# Patient Record
Sex: FEMALE | Race: WHITE | NOT HISPANIC OR LATINO | Employment: UNEMPLOYED | URBAN - NONMETROPOLITAN AREA
[De-identification: names, ages, dates, MRNs, and addresses within clinical notes are randomized per-mention and may not be internally consistent; named-entity substitution may affect disease eponyms.]

---

## 2017-03-29 ENCOUNTER — OFFICE VISIT (OUTPATIENT)
Dept: CARDIOLOGY | Facility: CLINIC | Age: 67
End: 2017-03-29

## 2017-03-29 VITALS
BODY MASS INDEX: 37.03 KG/M2 | HEART RATE: 67 BPM | WEIGHT: 209 LBS | DIASTOLIC BLOOD PRESSURE: 68 MMHG | OXYGEN SATURATION: 96 % | SYSTOLIC BLOOD PRESSURE: 114 MMHG | HEIGHT: 63 IN

## 2017-03-29 DIAGNOSIS — E03.4 HYPOTHYROIDISM DUE TO ACQUIRED ATROPHY OF THYROID: ICD-10-CM

## 2017-03-29 DIAGNOSIS — I10 ESSENTIAL HYPERTENSION: Primary | ICD-10-CM

## 2017-03-29 DIAGNOSIS — R06.02 SOB (SHORTNESS OF BREATH): ICD-10-CM

## 2017-03-29 DIAGNOSIS — E78.2 MIXED HYPERLIPIDEMIA: ICD-10-CM

## 2017-03-29 DIAGNOSIS — I31.39 PERICARDIAL EFFUSION: ICD-10-CM

## 2017-03-29 PROCEDURE — 99213 OFFICE O/P EST LOW 20 MIN: CPT | Performed by: INTERNAL MEDICINE

## 2017-03-29 NOTE — PROGRESS NOTES
subjective     Chief Complaint   Patient presents with   • Chest Pain   • Pericardial Effusion   • Hypertension   • Hyperlipidemia     History of Present Illness     Patient is here for follow-up of trivial pericardial effusion.  Patient had atypical chest pain and cardiac workup. 2 years ago and a stress test using IV Lexiscan was negative for significant exercise-induced myocardial ischemia LV ejection fraction was 77%. An echocardiogram showed trace pericardial effusion which was not significant.  She is doing very well at this time.  she has no chest pain .  She complains of being tired and fatigued off .     Patient is following closely with Dr. Veronica Russo  She brought a copy of her lab work recently done which was all normal including lipid panel thyroid functions and comprehensive metabolic panel.        Lipids are very well controlled with atorvastatin 20 mg daily     Patient also has hypothyroidism. It is controlled with Synthroid 112 µg daily.  Patient has not lost any weight but overall she seems to be doing well. Particularly from cardiac standpoint she has no symptoms     She states that she had the flu shot already from PCP but she has not had Prevnar 13 or pneumonia shot in her life. We will give her Prevnar 13 this year and she was instructed to get pneumonia shot from Dr. Russo next year along with flu shot again next year.     Patient does have severe COPD       Patient Active Problem List   Diagnosis   • Pericardial effusion*   • Hypertension*   • Hyperlipidemia*   • Hypothyroidism*   • COPD (chronic obstructive pulmonary disease)*       Social History   Substance Use Topics   • Smoking status: Former Smoker     Packs/day: 2.00     Years: 30.00     Types: Cigarettes     Quit date: 1980   • Smokeless tobacco: Never Used   • Alcohol use No       Allergies   Allergen Reactions   • Codeine          Current Outpatient Prescriptions:   •  amitriptyline (ELAVIL) 25 MG tablet, Take 25 mg by mouth daily.,  "Disp: , Rfl:   •  atenolol (TENORMIN) 25 MG tablet, Take 25 mg by mouth daily., Disp: , Rfl:   •  atorvastatin (LIPITOR) 20 MG tablet, Take 1 tablet by mouth daily., Disp: 90 tablet, Rfl: 3  •  Budesonide-Formoterol Fumarate (SYMBICORT IN), Inhale 2 puffs 2 (two) times a day as needed., Disp: , Rfl:   •  Calcium Carbonate-Vitamin D (CALCIUM 600+D3 PO), Take  by mouth., Disp: , Rfl:   •  levothyroxine (SYNTHROID, LEVOTHROID) 112 MCG tablet, Take 112 mcg by mouth daily., Disp: , Rfl:   •  lisinopril-hydrochlorothiazide (PRINZIDE,ZESTORETIC) 10-12.5 MG per tablet, Take 1 tablet by mouth daily., Disp: , Rfl:   •  omeprazole (PriLOSEC) 20 MG capsule, Take 20 mg by mouth 2 (two) times a day., Disp: , Rfl:   •  potassium chloride (K-DUR) 10 MEQ CR tablet, Take 10 mEq by mouth daily., Disp: , Rfl:   •  tolterodine LA (DETROL LA) 4 MG 24 hr capsule, Take 4 mg by mouth daily., Disp: , Rfl:   •  vitamin B-12 (CYANOCOBALAMIN) 100 MCG tablet, Take 500 mcg by mouth daily., Disp: , Rfl:   •  aspirin 81 MG EC tablet, Take 81 mg by mouth 3 (Three) Times a Week., Disp: , Rfl:   •  topiramate (TOPAMAX) 50 MG tablet, Take 50 mg by mouth daily., Disp: , Rfl:       The following portions of the patient's history were reviewed and updated as appropriate: allergies, current medications, past family history, past medical history, past social history, past surgical history and problem list.    Review of Systems   Constitution: Negative.   HENT: Negative.    Eyes: Negative.    Cardiovascular: Negative.    Respiratory: Positive for shortness of breath and wheezing.    Hematologic/Lymphatic: Negative.    Musculoskeletal: Negative.    Gastrointestinal: Negative.    Neurological: Negative.           Objective:     /68 (BP Location: Left arm)  Pulse 67  Ht 63\" (160 cm)  Wt 209 lb (94.8 kg)  SpO2 96%  BMI 37.02 kg/m2  Physical Exam   Constitutional: She appears well-developed and well-nourished.   HENT:   Head: Normocephalic and " atraumatic.   Mouth/Throat: Oropharynx is clear and moist.   Eyes: Conjunctivae and EOM are normal. Pupils are equal, round, and reactive to light. No scleral icterus.   Neck: Normal range of motion. Neck supple. No JVD present. No tracheal deviation present. No thyromegaly present.   Cardiovascular: Normal rate, regular rhythm, normal heart sounds and intact distal pulses.  Exam reveals no friction rub.    No murmur heard.  Pulmonary/Chest: Effort normal. No respiratory distress. She has wheezes. She has no rales. She exhibits no tenderness.   Abdominal: Soft. Bowel sounds are normal. She exhibits no distension and no mass. There is no tenderness. There is no rebound and no guarding.   Musculoskeletal: Normal range of motion. She exhibits no edema, tenderness or deformity.   Lymphadenopathy:     She has no cervical adenopathy.   Neurological: She is alert. She has normal reflexes. No cranial nerve deficit. She exhibits normal muscle tone. Coordination normal.   Skin: Skin is warm and dry.   Psychiatric: She has a normal mood and affect. Her behavior is normal. Judgment and thought content normal.         Lab Review    CMP, CBC, lipid panel and TSH dated February 27, 2017 is normal.  Procedures     I personally viewed and interpreted the patient's LAB data         Assessment:     1. Essential hypertension    2. Pericardial effusion*    3. Mixed hyperlipidemia    4. Hypothyroidism due to acquired atrophy of thyroid    5. SOB (shortness of breath)          Plan:      Patient is here for follow-up.  She has history of pericardial effusion  There is no evidence of cardiac decompensation.  She does have severe COPD and gets quite a bit of short of breath.    Blood pressure is very well controlled  Lipid functions are also normal.  No change in therapy was made.          Return in about 6 months (around 9/29/2017).

## 2017-09-14 ENCOUNTER — OFFICE VISIT (OUTPATIENT)
Dept: CARDIOLOGY | Facility: CLINIC | Age: 67
End: 2017-09-14

## 2017-09-14 VITALS
DIASTOLIC BLOOD PRESSURE: 78 MMHG | HEART RATE: 61 BPM | WEIGHT: 203.8 LBS | BODY MASS INDEX: 36.11 KG/M2 | OXYGEN SATURATION: 98 % | HEIGHT: 63 IN | SYSTOLIC BLOOD PRESSURE: 146 MMHG

## 2017-09-14 DIAGNOSIS — Z23 IMMUNIZATION DUE: ICD-10-CM

## 2017-09-14 DIAGNOSIS — R06.02 SOB (SHORTNESS OF BREATH): ICD-10-CM

## 2017-09-14 DIAGNOSIS — I31.39 PERICARDIAL EFFUSION: ICD-10-CM

## 2017-09-14 DIAGNOSIS — E78.2 MIXED HYPERLIPIDEMIA: Primary | ICD-10-CM

## 2017-09-14 PROCEDURE — G0008 ADMIN INFLUENZA VIRUS VAC: HCPCS | Performed by: INTERNAL MEDICINE

## 2017-09-14 PROCEDURE — 90686 IIV4 VACC NO PRSV 0.5 ML IM: CPT | Performed by: INTERNAL MEDICINE

## 2017-09-14 PROCEDURE — 99213 OFFICE O/P EST LOW 20 MIN: CPT | Performed by: INTERNAL MEDICINE

## 2017-09-14 PROCEDURE — 93000 ELECTROCARDIOGRAM COMPLETE: CPT | Performed by: INTERNAL MEDICINE

## 2017-09-14 RX ORDER — CEPHALEXIN 500 MG/1
500 CAPSULE ORAL 4 TIMES DAILY
COMMUNITY
End: 2019-09-12

## 2017-09-14 NOTE — PROGRESS NOTES
subjective     Chief Complaint   Patient presents with   • Follow-up     LAB RESULTS   • Hypertension   • Hyperlipidemia     History of Present Illness  Patient is here for follow-up.  She has history of atypical chest pain 2 years ago her last stress test was negative for significant exercise-induced myocardial ischemia and echo had shown trace pericardial effusion.  Patient has been doing very well.  She denies any chest pain palpitations or shortness of breath.    She recently had lab work done which was reviewed and has been normal.  She has hyperlipidemia very well controlled with atorvastatin 20 mg daily.  She is also hypothyroid and taking Synthroid replacement therapy.  Her TSH has been normal patient had the pneumonia shot last year and the she requests flu shot today.    Past Surgical History:   Procedure Laterality Date   • APPENDECTOMY     • CARDIAC CATHETERIZATION  2008   • CARDIOVASCULAR STRESS TEST  09/2014   • CATARACT EXTRACTION, BILATERAL Bilateral     2015 right eye, Dr Hauser  and  2/2017 left eye Dr Meyer   • ECHO - CONVERTED  09/2014   • GALLBLADDER SURGERY     • HERNIA REPAIR     • TUBAL ABDOMINAL LIGATION       Family History   Problem Relation Age of Onset   • Heart disease Mother    • Diabetes Mother    • Heart attack Mother      cause of death   • Cirrhosis Father      cause of death   • Cancer Sister      liver ca   • Cancer Brother      lung ca   • Cancer Brother      colon ca   • Heart disease Brother    • Heart failure Brother    • Cancer Sister      lung ca   • Heart failure Sister    • Cancer Sister      Past Medical History:   Diagnosis Date   • Chest pain    • COPD (chronic obstructive pulmonary disease)    • GERD (gastroesophageal reflux disease)    • Hyperlipidemia    • Hypertension    • Hypokalemia    • Hypothyroidism    • Pericardial effusion, trivial      Patient Active Problem List   Diagnosis   • Pericardial effusion*   • Hypertension*   • Hyperlipidemia*   • Hypothyroidism*    • COPD (chronic obstructive pulmonary disease)*       Social History   Substance Use Topics   • Smoking status: Former Smoker     Packs/day: 2.00     Years: 30.00     Types: Cigarettes     Quit date: 1980   • Smokeless tobacco: Never Used   • Alcohol use No       Allergies   Allergen Reactions   • Codeine        Current Outpatient Prescriptions on File Prior to Visit   Medication Sig   • amitriptyline (ELAVIL) 25 MG tablet Take 25 mg by mouth daily.   • atenolol (TENORMIN) 25 MG tablet Take 25 mg by mouth daily.   • atorvastatin (LIPITOR) 20 MG tablet Take 1 tablet by mouth daily.   • Budesonide-Formoterol Fumarate (SYMBICORT IN) Inhale 2 puffs 2 (two) times a day as needed.   • Calcium Carbonate-Vitamin D (CALCIUM 600+D3 PO) Take  by mouth.   • levothyroxine (SYNTHROID, LEVOTHROID) 112 MCG tablet Take 112 mcg by mouth daily.   • lisinopril-hydrochlorothiazide (PRINZIDE,ZESTORETIC) 10-12.5 MG per tablet Take 1 tablet by mouth daily.   • omeprazole (PriLOSEC) 20 MG capsule Take 20 mg by mouth 2 (two) times a day.   • potassium chloride (K-DUR) 10 MEQ CR tablet Take 10 mEq by mouth daily.   • tolterodine LA (DETROL LA) 4 MG 24 hr capsule Take 4 mg by mouth daily.   • topiramate (TOPAMAX) 50 MG tablet Take 50 mg by mouth daily.   • vitamin B-12 (CYANOCOBALAMIN) 100 MCG tablet Take 500 mcg by mouth daily.   • aspirin 81 MG EC tablet Take 81 mg by mouth 3 (Three) Times a Week.     No current facility-administered medications on file prior to visit.          The following portions of the patient's history were reviewed and updated as appropriate: allergies, current medications, past family history, past medical history, past social history, past surgical history and problem list.    Review of Systems   Constitution: Negative.   HENT: Negative.  Negative for congestion and headaches.    Eyes: Negative.    Cardiovascular: Negative.  Negative for chest pain, cyanosis, dyspnea on exertion, irregular heartbeat, leg swelling,  "near-syncope, orthopnea, palpitations, paroxysmal nocturnal dyspnea and syncope.   Respiratory: Negative.  Negative for shortness of breath.    Hematologic/Lymphatic: Negative.    Musculoskeletal: Negative.    Gastrointestinal: Negative.    Neurological: Negative.           Objective:     /78 (BP Location: Left arm, Patient Position: Sitting)  Pulse 61  Ht 63\" (160 cm)  Wt 203 lb 12.8 oz (92.4 kg)  SpO2 98%  BMI 36.1 kg/m2  Physical Exam   Constitutional: She appears well-developed and well-nourished. No distress.   HENT:   Head: Normocephalic and atraumatic.   Mouth/Throat: Oropharynx is clear and moist. No oropharyngeal exudate.   Eyes: Conjunctivae and EOM are normal. Pupils are equal, round, and reactive to light. No scleral icterus.   Neck: Normal range of motion. Neck supple. No JVD present. No tracheal deviation present. No thyromegaly present.   Cardiovascular: Normal rate, regular rhythm, normal heart sounds and intact distal pulses.  PMI is not displaced.  Exam reveals no gallop, no friction rub and no decreased pulses.    No murmur heard.  Pulses:       Carotid pulses are 3+ on the right side, and 3+ on the left side.       Radial pulses are 3+ on the right side, and 3+ on the left side.   Pulmonary/Chest: Effort normal and breath sounds normal. No respiratory distress. She has no wheezes. She has no rales. She exhibits no tenderness.   Abdominal: Soft. Bowel sounds are normal. She exhibits no distension, no abdominal bruit and no mass. There is no splenomegaly or hepatomegaly. There is no tenderness. There is no rebound and no guarding.   Musculoskeletal: Normal range of motion. She exhibits no edema, tenderness or deformity.   Lymphadenopathy:     She has no cervical adenopathy.   Neurological: She is alert. She has normal reflexes. No cranial nerve deficit. She exhibits normal muscle tone. Coordination normal.   Skin: Skin is warm and dry. No rash noted. She is not diaphoretic. No erythema. "   Psychiatric: She has a normal mood and affect. Her behavior is normal. Judgment and thought content normal.         Lab ReviewDated 8/24/2017  CBC CMP normal sodium mildly elevated 146  Lipid panel normal, hemoglobin A1c 5.8, TSH 0.826       ECG 12 Lead  Date/Time: 9/14/2017 4:02 PM  Performed by: NICO JARAMILLO  Authorized by: NICO JARAMILLO   Rhythm: sinus rhythm  Rate: normal  Conduction: conduction normal  ST Segments: ST segments normal  T Waves: T waves normal  QRS axis: normal  Other findings: LAE  Clinical impression: abnormal ECG               I personally viewed and interpreted the patient's LAB data         Assessment:     1. Mixed hyperlipidemia    2. Immunization due    3. Pericardial effusion*    4. SOB (shortness of breath)          Plan:      Patient is doing very well.  Lab work reviewed.  Lipids are very nicely controlled.  Patient will be reevaluated in 6 months with lab work again.  Flu shot was given per patient's request.  Lab work discussed with the patient.  EKG was done which was also discussed no acute abnormality found.        Return in about 6 months (around 3/14/2018).

## 2018-03-14 ENCOUNTER — OFFICE VISIT (OUTPATIENT)
Dept: CARDIOLOGY | Facility: CLINIC | Age: 68
End: 2018-03-14

## 2018-03-14 VITALS
OXYGEN SATURATION: 98 % | WEIGHT: 204 LBS | DIASTOLIC BLOOD PRESSURE: 80 MMHG | BODY MASS INDEX: 36.14 KG/M2 | HEIGHT: 63 IN | SYSTOLIC BLOOD PRESSURE: 132 MMHG | HEART RATE: 78 BPM

## 2018-03-14 DIAGNOSIS — E78.2 MIXED HYPERLIPIDEMIA: Primary | ICD-10-CM

## 2018-03-14 DIAGNOSIS — I10 ESSENTIAL HYPERTENSION: ICD-10-CM

## 2018-03-14 DIAGNOSIS — E66.09 CLASS 2 OBESITY DUE TO EXCESS CALORIES WITHOUT SERIOUS COMORBIDITY WITH BODY MASS INDEX (BMI) OF 36.0 TO 36.9 IN ADULT: ICD-10-CM

## 2018-03-14 PROCEDURE — 99213 OFFICE O/P EST LOW 20 MIN: CPT | Performed by: INTERNAL MEDICINE

## 2018-03-14 NOTE — PROGRESS NOTES
subjective     Chief Complaint   Patient presents with   • Follow-up     Routine check up   • Results     Lab Results     History of Present Illness    Patient is 68 years old white female who has a history of hyperlipidemia.  She is taking Lipitor 20 mg daily.  Lab work reviewed and discussed with the patient.  Lab work shows normal lipid control.  With no side effects.    Patient is overweight she has not lost any weight.  Healthy lifestyle was discussed.    Patient also has hypothyroidism which is being managed by her PCP Dr. Russo.  TSH was low and Synthroid dose was recently decreased to 100 µg daily by her physician.  Overall she seems to be doing better.  The patient is very well controlled and no specific complaints    Past Surgical History:   Procedure Laterality Date   • APPENDECTOMY     • CARDIAC CATHETERIZATION  2008   • CARDIOVASCULAR STRESS TEST  09/2014   • CATARACT EXTRACTION, BILATERAL Bilateral     2015 right eye, Dr Hauser  and  2/2017 left eye Dr Meyer   • ECHO - CONVERTED  09/2014   • GALLBLADDER SURGERY     • HERNIA REPAIR     • TUBAL ABDOMINAL LIGATION       Family History   Problem Relation Age of Onset   • Heart disease Mother    • Diabetes Mother    • Heart attack Mother      cause of death   • Cirrhosis Father      cause of death   • Cancer Sister      liver ca   • Cancer Brother      lung ca   • Cancer Brother      colon ca   • Heart disease Brother    • Heart failure Brother    • Cancer Sister      lung ca   • Heart failure Sister    • Cancer Sister      Past Medical History:   Diagnosis Date   • Chest pain    • COPD (chronic obstructive pulmonary disease)    • GERD (gastroesophageal reflux disease)    • Hyperlipidemia    • Hypertension    • Hypokalemia    • Hypothyroidism    • Pericardial effusion, trivial      Patient Active Problem List   Diagnosis   • Pericardial effusion*   • Hypertension*   • Hyperlipidemia*   • Hypothyroidism*   • COPD (chronic obstructive pulmonary disease)*   •  Class 2 obesity due to excess calories without serious comorbidity with body mass index (BMI) of 36.0 to 36.9 in adult       Social History   Substance Use Topics   • Smoking status: Former Smoker     Packs/day: 2.00     Years: 30.00     Types: Cigarettes     Quit date: 1980   • Smokeless tobacco: Never Used   • Alcohol use No       Allergies   Allergen Reactions   • Codeine        Current Outpatient Prescriptions on File Prior to Visit   Medication Sig   • amitriptyline (ELAVIL) 25 MG tablet Take 25 mg by mouth daily.   • atenolol (TENORMIN) 25 MG tablet Take 25 mg by mouth daily.   • atorvastatin (LIPITOR) 20 MG tablet Take 1 tablet by mouth daily.   • Budesonide-Formoterol Fumarate (SYMBICORT IN) Inhale 2 puffs 2 (two) times a day as needed.   • Calcium Carbonate-Vitamin D (CALCIUM 600+D3 PO) Take  by mouth.   • cephalexin (KEFLEX) 500 MG capsule Take 500 mg by mouth 4 (Four) Times a Day.   • levothyroxine (SYNTHROID, LEVOTHROID) 112 MCG tablet Take 100 mcg by mouth Daily.   • lisinopril-hydrochlorothiazide (PRINZIDE,ZESTORETIC) 10-12.5 MG per tablet Take 1 tablet by mouth daily.   • omeprazole (PriLOSEC) 20 MG capsule Take 20 mg by mouth 2 (two) times a day.   • potassium chloride (K-DUR) 10 MEQ CR tablet Take 10 mEq by mouth daily.   • tolterodine LA (DETROL LA) 4 MG 24 hr capsule Take 4 mg by mouth daily.   • vitamin B-12 (CYANOCOBALAMIN) 100 MCG tablet Take 500 mcg by mouth daily.   • aspirin 81 MG EC tablet Take 81 mg by mouth 3 (Three) Times a Week.   • topiramate (TOPAMAX) 50 MG tablet Take 50 mg by mouth daily.     No current facility-administered medications on file prior to visit.          The following portions of the patient's history were reviewed and updated as appropriate: allergies, current medications, past family history, past medical history, past social history, past surgical history and problem list.    Review of Systems   Constitution: Negative.   HENT: Negative.  Negative for congestion.   "  Eyes: Negative.    Cardiovascular: Negative.  Negative for chest pain, cyanosis, dyspnea on exertion, irregular heartbeat, leg swelling, near-syncope, orthopnea, palpitations, paroxysmal nocturnal dyspnea and syncope.   Respiratory: Negative.  Negative for shortness of breath.    Hematologic/Lymphatic: Negative.    Musculoskeletal: Negative.    Gastrointestinal: Negative.    Neurological: Negative.  Negative for headaches.          Objective:     /80 (BP Location: Left arm, Patient Position: Sitting)   Pulse 78   Ht 160 cm (63\")   Wt 92.5 kg (204 lb)   SpO2 98%   Breastfeeding? Unknown   BMI 36.14 kg/m²   Physical Exam   Constitutional: She appears well-developed and well-nourished. No distress.   HENT:   Head: Normocephalic and atraumatic.   Mouth/Throat: Oropharynx is clear and moist. No oropharyngeal exudate.   Eyes: Conjunctivae and EOM are normal. Pupils are equal, round, and reactive to light. No scleral icterus.   Neck: Normal range of motion. Neck supple. No JVD present. No tracheal deviation present. No thyromegaly present.   Cardiovascular: Normal rate, regular rhythm, normal heart sounds and intact distal pulses.  PMI is not displaced.  Exam reveals no gallop, no friction rub and no decreased pulses.    No murmur heard.  Pulses:       Carotid pulses are 3+ on the right side, and 3+ on the left side.       Radial pulses are 3+ on the right side, and 3+ on the left side.   Pulmonary/Chest: Effort normal and breath sounds normal. No respiratory distress. She has no wheezes. She has no rales. She exhibits no tenderness.   Abdominal: Soft. Bowel sounds are normal. She exhibits no distension, no abdominal bruit and no mass. There is no splenomegaly or hepatomegaly. There is no tenderness. There is no rebound and no guarding.   Musculoskeletal: Normal range of motion. She exhibits no edema, tenderness or deformity.   Lymphadenopathy:     She has no cervical adenopathy.   Neurological: She is alert. " She has normal reflexes. No cranial nerve deficit. She exhibits normal muscle tone. Coordination normal.   Skin: Skin is warm and dry. No rash noted. She is not diaphoretic. No erythema.   Psychiatric: She has a normal mood and affect. Her behavior is normal. Judgment and thought content normal.         Lab Review  CBC CMP lipid panel vitamin D normal except sugar 136 and A1c 5.7.  TSH is low at 0.248  Procedures       I personally viewed and interpreted the patient's LAB data         Assessment:     1. Mixed hyperlipidemia    2. Essential hypertension    3. Class 2 obesity due to excess calories without serious comorbidity with body mass index (BMI) of 36.0 to 36.9 in adult          Plan:      Labs reviewed and discussed with the patient lipids are normal with normal liver functions and CPK.  Patient will continue Lipitor 20 mg daily    Blood pressure is very well controlled on Norvasc, lisinopril   and Coreg.    Healthy lifestyle and weight loss exercise program discussed with the patient follow-up in 6 months with lab work.  Refills of Lipitor    Return in about 6 months (around 9/14/2018).

## 2018-09-14 ENCOUNTER — OFFICE VISIT (OUTPATIENT)
Dept: CARDIOLOGY | Facility: CLINIC | Age: 68
End: 2018-09-14

## 2018-09-14 VITALS
RESPIRATION RATE: 16 BRPM | HEIGHT: 63 IN | BODY MASS INDEX: 37.6 KG/M2 | WEIGHT: 212.2 LBS | OXYGEN SATURATION: 98 % | HEART RATE: 70 BPM | SYSTOLIC BLOOD PRESSURE: 128 MMHG | DIASTOLIC BLOOD PRESSURE: 72 MMHG

## 2018-09-14 DIAGNOSIS — I10 ESSENTIAL HYPERTENSION: ICD-10-CM

## 2018-09-14 DIAGNOSIS — R06.02 SOB (SHORTNESS OF BREATH): ICD-10-CM

## 2018-09-14 DIAGNOSIS — E66.09 CLASS 2 OBESITY DUE TO EXCESS CALORIES WITHOUT SERIOUS COMORBIDITY WITH BODY MASS INDEX (BMI) OF 36.0 TO 36.9 IN ADULT: ICD-10-CM

## 2018-09-14 DIAGNOSIS — E78.2 MIXED HYPERLIPIDEMIA: Primary | ICD-10-CM

## 2018-09-14 PROCEDURE — 93000 ELECTROCARDIOGRAM COMPLETE: CPT | Performed by: INTERNAL MEDICINE

## 2018-09-14 PROCEDURE — 99213 OFFICE O/P EST LOW 20 MIN: CPT | Performed by: INTERNAL MEDICINE

## 2018-09-14 NOTE — PROGRESS NOTES
subjective     Chief Complaint   Patient presents with   • Hypertension   • Hyperlipidemia     History of Present Illness    Patient is 68 years old white female who is here for follow-up.  Patient has history of hypertension and hyperlipidemia.  She is taking Tenormin 25 mg daily and lisinopril hydrochlorothiazide.  Blood pressure is very well controlled.  Patient also has hyperlipidemia and she is taking Lipitor 20 mg daily.  There is no drug side effects.  She also has hypothyroidism on Synthroid replacement therapy    Past Surgical History:   Procedure Laterality Date   • APPENDECTOMY     • CARDIAC CATHETERIZATION  2008   • CARDIOVASCULAR STRESS TEST  09/2014   • CATARACT EXTRACTION, BILATERAL Bilateral     2015 right eye, Dr Hauser  and  2/2017 left eye Dr Meyer   • ECHO - CONVERTED  09/2014   • GALLBLADDER SURGERY     • HERNIA REPAIR     • TUBAL ABDOMINAL LIGATION       Family History   Problem Relation Age of Onset   • Heart disease Mother    • Diabetes Mother    • Heart attack Mother         cause of death   • Cirrhosis Father         cause of death   • Cancer Sister         liver ca   • Cancer Brother         lung ca   • Cancer Brother         colon ca   • Heart disease Brother    • Heart failure Brother    • Cancer Sister         lung ca   • Heart failure Sister    • Cancer Sister      Past Medical History:   Diagnosis Date   • Chest pain    • COPD (chronic obstructive pulmonary disease) (CMS/HCC)    • GERD (gastroesophageal reflux disease)    • Hyperlipidemia    • Hypertension    • Hypokalemia    • Hypothyroidism    • Pericardial effusion, trivial      Patient Active Problem List   Diagnosis   • Pericardial effusion*   • Hypertension*   • Hyperlipidemia*   • Hypothyroidism*   • COPD (chronic obstructive pulmonary disease)*   • Class 2 obesity due to excess calories without serious comorbidity with body mass index (BMI) of 36.0 to 36.9 in adult       Social History   Substance Use Topics   • Smoking status:  Former Smoker     Packs/day: 2.00     Years: 30.00     Types: Cigarettes     Quit date: 1980   • Smokeless tobacco: Never Used   • Alcohol use No       Allergies   Allergen Reactions   • Codeine        Current Outpatient Prescriptions on File Prior to Visit   Medication Sig   • amitriptyline (ELAVIL) 25 MG tablet Take 25 mg by mouth daily.   • aspirin 81 MG EC tablet Take 81 mg by mouth 3 (Three) Times a Week.   • atenolol (TENORMIN) 25 MG tablet Take 25 mg by mouth daily.   • atorvastatin (LIPITOR) 20 MG tablet Take 1 tablet by mouth daily.   • Budesonide-Formoterol Fumarate (SYMBICORT IN) Inhale 2 puffs 2 (two) times a day as needed.   • Calcium Carbonate-Vitamin D (CALCIUM 600+D3 PO) Take  by mouth.   • cephalexin (KEFLEX) 500 MG capsule Take 500 mg by mouth 4 (Four) Times a Day.   • levothyroxine (SYNTHROID, LEVOTHROID) 112 MCG tablet Take 100 mcg by mouth Daily.   • lisinopril-hydrochlorothiazide (PRINZIDE,ZESTORETIC) 10-12.5 MG per tablet Take 1 tablet by mouth daily.   • omeprazole (PriLOSEC) 20 MG capsule Take 20 mg by mouth 2 (two) times a day.   • potassium chloride (K-DUR) 10 MEQ CR tablet Take 10 mEq by mouth daily.   • tolterodine LA (DETROL LA) 4 MG 24 hr capsule Take 4 mg by mouth daily.   • topiramate (TOPAMAX) 50 MG tablet Take 50 mg by mouth daily.   • vitamin B-12 (CYANOCOBALAMIN) 100 MCG tablet Take 500 mcg by mouth daily.     No current facility-administered medications on file prior to visit.          The following portions of the patient's history were reviewed and updated as appropriate: allergies, current medications, past family history, past medical history, past social history, past surgical history and problem list.    Review of Systems   Constitution: Negative.   HENT: Negative.  Negative for congestion.    Eyes: Negative.    Cardiovascular: Negative.  Negative for chest pain, cyanosis, dyspnea on exertion, irregular heartbeat, leg swelling, near-syncope, orthopnea, palpitations,  "paroxysmal nocturnal dyspnea and syncope.   Respiratory: Negative.  Negative for shortness of breath.    Hematologic/Lymphatic: Negative.    Musculoskeletal: Negative.    Gastrointestinal: Negative.    Neurological: Negative.  Negative for headaches.          Objective:     /72 (BP Location: Right arm)   Pulse 70   Resp 16   Ht 160 cm (62.99\")   Wt 96.3 kg (212 lb 3.2 oz)   SpO2 98%   BMI 37.60 kg/m²   Physical Exam   Constitutional: She appears well-developed and well-nourished. No distress.   HENT:   Head: Normocephalic and atraumatic.   Mouth/Throat: Oropharynx is clear and moist. No oropharyngeal exudate.   Eyes: Pupils are equal, round, and reactive to light. Conjunctivae and EOM are normal. No scleral icterus.   Neck: Normal range of motion. Neck supple. No JVD present. No tracheal deviation present. No thyromegaly present.   Cardiovascular: Normal rate, regular rhythm, normal heart sounds and intact distal pulses.  PMI is not displaced.  Exam reveals no gallop, no friction rub and no decreased pulses.    No murmur heard.  Pulses:       Carotid pulses are 3+ on the right side, and 3+ on the left side.       Radial pulses are 3+ on the right side, and 3+ on the left side.   Pulmonary/Chest: Effort normal and breath sounds normal. No respiratory distress. She has no wheezes. She has no rales. She exhibits no tenderness.   Abdominal: Soft. Bowel sounds are normal. She exhibits no distension, no abdominal bruit and no mass. There is no splenomegaly or hepatomegaly. There is no tenderness. There is no rebound and no guarding.   Musculoskeletal: Normal range of motion. She exhibits no edema, tenderness or deformity.   Lymphadenopathy:     She has no cervical adenopathy.   Neurological: She is alert. She has normal reflexes. No cranial nerve deficit. She exhibits normal muscle tone. Coordination normal.   Skin: Skin is warm and dry. No rash noted. She is not diaphoretic. No erythema.   Psychiatric: She " has a normal mood and affect. Her behavior is normal. Judgment and thought content normal.         Lab Review            ECG 12 Lead  Date/Time: 9/14/2018 6:22 PM  Performed by: NICO JARAMILLO  Authorized by: NICO JARAMILLO   Comparison: compared with previous ECG from 9/14/2017  Similar to previous ECG  Rhythm: sinus rhythm  Rate: normal  BPM: 87  Conduction: incomplete RBBB  ST Segments: ST segments normal  T Waves: T waves normal  QRS axis: normal  Other: no other findings  Clinical impression: normal ECG               I personally viewed and interpreted the patient's LAB data         Assessment:     1. Mixed hyperlipidemia    2. Essential hypertension    3. Class 2 obesity due to excess calories without serious comorbidity with body mass index (BMI) of 36.0 to 36.9 in adult    4. SOB (shortness of breath)          Plan:      Patient has hyperlipidemia.  Labs reviewed cholesterol is 168 triglyceride 169.  HDL is 55 LDL 79.  Patient was advised to continue Lipitor 20 mg daily.  Blood pressure is very well controlled.  Weight loss was emphasized.  No change in therapy.        Return in about 6 months (around 3/14/2019).

## 2019-03-14 ENCOUNTER — OFFICE VISIT (OUTPATIENT)
Dept: CARDIOLOGY | Facility: CLINIC | Age: 69
End: 2019-03-14

## 2019-03-14 VITALS
BODY MASS INDEX: 48.2 KG/M2 | HEART RATE: 71 BPM | WEIGHT: 272 LBS | OXYGEN SATURATION: 96 % | HEIGHT: 63 IN | DIASTOLIC BLOOD PRESSURE: 78 MMHG | SYSTOLIC BLOOD PRESSURE: 132 MMHG

## 2019-03-14 DIAGNOSIS — I31.39 PERICARDIAL EFFUSION: ICD-10-CM

## 2019-03-14 DIAGNOSIS — E66.09 CLASS 2 OBESITY DUE TO EXCESS CALORIES WITHOUT SERIOUS COMORBIDITY WITH BODY MASS INDEX (BMI) OF 36.0 TO 36.9 IN ADULT: ICD-10-CM

## 2019-03-14 DIAGNOSIS — I10 ESSENTIAL HYPERTENSION: Primary | ICD-10-CM

## 2019-03-14 DIAGNOSIS — E78.2 MIXED HYPERLIPIDEMIA: ICD-10-CM

## 2019-03-14 DIAGNOSIS — R06.02 SOB (SHORTNESS OF BREATH): ICD-10-CM

## 2019-03-14 PROCEDURE — 99213 OFFICE O/P EST LOW 20 MIN: CPT | Performed by: INTERNAL MEDICINE

## 2019-03-14 RX ORDER — ATORVASTATIN CALCIUM 20 MG/1
20 TABLET, FILM COATED ORAL DAILY
COMMUNITY

## 2019-03-14 NOTE — PROGRESS NOTES
subjective     Chief Complaint   Patient presents with   • Hyperlipidemia   • Follow-up     History of Present Illness  Patient is 69 years old white female who is here for cardiology follow-up.  Patient has multiple chronic medical problems.  She has a trivial pericardial effusion which is hemodynamically insignificant  Patient is overweight and is trying to lose weight.  She also has essential hypertension and hyperlipidemia.  She recently had lab work done which will be reviewed.  Patient has hypothyroidism on Synthroid replacement therapy.  Clinically she is doing very well and is asymptomatic.    Past Surgical History:   Procedure Laterality Date   • APPENDECTOMY     • CARDIAC CATHETERIZATION  2008   • CARDIOVASCULAR STRESS TEST  09/2014   • CATARACT EXTRACTION, BILATERAL Bilateral     2015 right eye, Dr Hauser  and  2/2017 left eye Dr Meyer   • ECHO - CONVERTED  09/2014   • GALLBLADDER SURGERY     • HERNIA REPAIR     • TUBAL ABDOMINAL LIGATION       Family History   Problem Relation Age of Onset   • Heart disease Mother    • Diabetes Mother    • Heart attack Mother         cause of death   • Cirrhosis Father         cause of death   • Cancer Sister         liver ca   • Cancer Brother         lung ca   • Cancer Brother         colon ca   • Heart disease Brother    • Heart failure Brother    • Cancer Sister         lung ca   • Heart failure Sister    • Cancer Sister      Past Medical History:   Diagnosis Date   • Chest pain    • COPD (chronic obstructive pulmonary disease) (CMS/HCC)    • GERD (gastroesophageal reflux disease)    • Hyperlipidemia    • Hypertension    • Hypokalemia    • Hypothyroidism    • Pericardial effusion, trivial      Patient Active Problem List   Diagnosis   • Pericardial effusion*   • Hypertension*   • Hyperlipidemia*   • Hypothyroidism*   • COPD (chronic obstructive pulmonary disease)*   • Class 2 obesity due to excess calories without serious comorbidity with body mass index (BMI) of 36.0  to 36.9 in adult       Social History     Tobacco Use   • Smoking status: Former Smoker     Packs/day: 2.00     Years: 30.00     Pack years: 60.00     Types: Cigarettes     Last attempt to quit: 1980     Years since quittin.2   • Smokeless tobacco: Never Used   Substance Use Topics   • Alcohol use: No   • Drug use: No       Allergies   Allergen Reactions   • Codeine        Current Outpatient Medications on File Prior to Visit   Medication Sig   • amitriptyline (ELAVIL) 25 MG tablet Take 25 mg by mouth daily.   • atenolol (TENORMIN) 25 MG tablet Take 25 mg by mouth daily.   • atorvastatin (LIPITOR) 20 MG tablet Take 20 mg by mouth Daily.   • Budesonide-Formoterol Fumarate (SYMBICORT IN) Inhale 2 puffs 2 (two) times a day as needed.   • Calcium Carbonate-Vitamin D (CALCIUM 600+D3 PO) Take  by mouth.   • cephalexin (KEFLEX) 500 MG capsule Take 500 mg by mouth 4 (Four) Times a Day.   • levothyroxine (SYNTHROID, LEVOTHROID) 112 MCG tablet Take 100 mcg by mouth Daily.   • lisinopril-hydrochlorothiazide (PRINZIDE,ZESTORETIC) 10-12.5 MG per tablet Take 1 tablet by mouth daily.   • omeprazole (PriLOSEC) 20 MG capsule Take 20 mg by mouth 2 (two) times a day.   • potassium chloride (K-DUR) 10 MEQ CR tablet Take 10 mEq by mouth daily.   • tolterodine LA (DETROL LA) 4 MG 24 hr capsule Take 4 mg by mouth daily.   • topiramate (TOPAMAX) 50 MG tablet Take 50 mg by mouth daily.   • vitamin B-12 (CYANOCOBALAMIN) 100 MCG tablet Take 500 mcg by mouth daily.   • [DISCONTINUED] atorvastatin (LIPITOR) 20 MG tablet Take 1 tablet by mouth daily.   • [DISCONTINUED] aspirin 81 MG EC tablet Take 81 mg by mouth 3 (Three) Times a Week.     No current facility-administered medications on file prior to visit.          The following portions of the patient's history were reviewed and updated as appropriate: allergies, current medications, past family history, past medical history, past social history, past surgical history and problem  "list.    Review of Systems   Constitution: Negative.   HENT: Negative for congestion.    Eyes: Negative.    Cardiovascular: Negative.  Negative for chest pain, cyanosis, dyspnea on exertion, irregular heartbeat, leg swelling, near-syncope, orthopnea, palpitations, paroxysmal nocturnal dyspnea and syncope.   Respiratory: Positive for shortness of breath.    Hematologic/Lymphatic: Negative.    Musculoskeletal: Negative.    Gastrointestinal: Positive for heartburn.   Genitourinary: Positive for urgency.   Neurological: Positive for headaches.   Psychiatric/Behavioral: The patient is nervous/anxious.           Objective:     /78 (BP Location: Left arm, Patient Position: Sitting)   Pulse 71   Ht 160 cm (62.99\")   Wt 123 kg (272 lb)   SpO2 96%   BMI 48.20 kg/m²   Physical Exam   Constitutional: She appears well-developed and well-nourished. No distress.   HENT:   Head: Normocephalic and atraumatic.   Mouth/Throat: Oropharynx is clear and moist. No oropharyngeal exudate.   Eyes: Conjunctivae and EOM are normal. Pupils are equal, round, and reactive to light. No scleral icterus.   Neck: Normal range of motion. Neck supple. No JVD present. No tracheal deviation present. No thyromegaly present.   Cardiovascular: Normal rate, regular rhythm, normal heart sounds and intact distal pulses. PMI is not displaced. Exam reveals no gallop, no friction rub and no decreased pulses.   No murmur heard.  Pulses:       Carotid pulses are 3+ on the right side, and 3+ on the left side.       Radial pulses are 3+ on the right side, and 3+ on the left side.   Pulmonary/Chest: Effort normal and breath sounds normal. No respiratory distress. She has no wheezes. She has no rales. She exhibits no tenderness.   Abdominal: Soft. Bowel sounds are normal. She exhibits no distension, no abdominal bruit and no mass. There is no splenomegaly or hepatomegaly. There is no tenderness. There is no rebound and no guarding.   Musculoskeletal: Normal " range of motion. She exhibits no edema, tenderness or deformity.   Lymphadenopathy:     She has no cervical adenopathy.   Neurological: She is alert. She has normal reflexes. No cranial nerve deficit. She exhibits normal muscle tone. Coordination normal.   Skin: Skin is warm and dry. No rash noted. She is not diaphoretic. No erythema.   Psychiatric: She has a normal mood and affect. Her behavior is normal. Judgment and thought content normal.         Lab Review Dated January 22, 2019  CBC CMP lipid panel thyroid functions reviewed and discussed with the patient  All normal except estimated GFR  54    Procedures       I personally viewed and interpreted the patient's LAB data         Assessment:     1. Essential hypertension    2. SOB (shortness of breath)    3. Mixed hyperlipidemia    4. Pericardial effusion*    5. Class 2 obesity due to excess calories without serious comorbidity with body mass index (BMI) of 36.0 to 36.9 in adult          Plan:     Patient has a trivial pericardial effusion in the past without cardiac tamponade.  This is probably related to obesity and hypothyroidism.  Lab work reviewed.  CBC CMP lipid panel and thyroid functions are all normal.  Estimated GFR is 54.  Blood pressure is very well controlled.  Lipid panel is normal.  Patient is still trying to lose weight and is quite a bit overweight.  Healthy lifestyle again discussed.  She will follow closely with Roxie HAMMOND.  No change in therapy        Return in about 6 months (around 9/14/2019).

## 2019-09-12 ENCOUNTER — OFFICE VISIT (OUTPATIENT)
Dept: CARDIOLOGY | Facility: CLINIC | Age: 69
End: 2019-09-12

## 2019-09-12 VITALS
OXYGEN SATURATION: 97 % | DIASTOLIC BLOOD PRESSURE: 88 MMHG | HEART RATE: 60 BPM | HEIGHT: 63 IN | SYSTOLIC BLOOD PRESSURE: 134 MMHG | WEIGHT: 209.8 LBS | BODY MASS INDEX: 37.17 KG/M2

## 2019-09-12 DIAGNOSIS — E66.09 CLASS 2 OBESITY DUE TO EXCESS CALORIES WITHOUT SERIOUS COMORBIDITY WITH BODY MASS INDEX (BMI) OF 36.0 TO 36.9 IN ADULT: ICD-10-CM

## 2019-09-12 DIAGNOSIS — I10 ESSENTIAL HYPERTENSION: ICD-10-CM

## 2019-09-12 DIAGNOSIS — E78.2 MIXED HYPERLIPIDEMIA: Primary | ICD-10-CM

## 2019-09-12 PROCEDURE — 99213 OFFICE O/P EST LOW 20 MIN: CPT | Performed by: INTERNAL MEDICINE

## 2019-09-12 NOTE — PROGRESS NOTES
subjective     Chief Complaint   Patient presents with   • Hypertension     History of Present Illness  Patient is 69 years old white female who is here for cardiology follow-up.  He has a history of hyperlipidemia and is taking Lipitor 20 mg daily.  She is tolerating medications very well without any drug side effects.  Heart rate and blood pressure is also controlled.  She is taking lisinopril 10/12.5 along with Tenormin 25 mg daily.  Patient states that her lab work was very good at this time lipid panel was normal blood sugar was also good.  Thyroid functions were slightly abnormal and thyroid dose had to be increased.  Patient is also trying to lose weight and has lost a few pounds.  She is very active and is doing better.  Patient has history of trivial pericardial effusion but currently is totally asymptomatic    Past Surgical History:   Procedure Laterality Date   • APPENDECTOMY     • CARDIAC CATHETERIZATION  2008   • CARDIOVASCULAR STRESS TEST  09/2014   • CATARACT EXTRACTION, BILATERAL Bilateral     2015 right eye, Dr Hauser  and  2/2017 left eye Dr Meyer   • ECHO - CONVERTED  09/2014   • GALLBLADDER SURGERY     • HERNIA REPAIR     • TUBAL ABDOMINAL LIGATION       Family History   Problem Relation Age of Onset   • Heart disease Mother    • Diabetes Mother    • Heart attack Mother         cause of death   • Cirrhosis Father         cause of death   • Cancer Sister         liver ca   • Cancer Brother         lung ca   • Cancer Brother         colon ca   • Heart disease Brother    • Heart failure Brother    • Cancer Sister         lung ca   • Heart failure Sister    • Cancer Sister      Past Medical History:   Diagnosis Date   • Chest pain    • COPD (chronic obstructive pulmonary disease) (CMS/HCC)    • GERD (gastroesophageal reflux disease)    • Hyperlipidemia    • Hypertension    • Hypokalemia    • Hypothyroidism    • Pericardial effusion, trivial      Patient Active Problem List   Diagnosis   • Pericardial  effusion*   • Hypertension*   • Hyperlipidemia*   • Hypothyroidism*   • COPD (chronic obstructive pulmonary disease)*   • Class 2 obesity due to excess calories without serious comorbidity with body mass index (BMI) of 36.0 to 36.9 in adult       Social History     Tobacco Use   • Smoking status: Former Smoker     Packs/day: 2.00     Years: 30.00     Pack years: 60.00     Types: Cigarettes     Last attempt to quit: 1980     Years since quittin.7   • Smokeless tobacco: Never Used   Substance Use Topics   • Alcohol use: No   • Drug use: No       Allergies   Allergen Reactions   • Codeine        Current Outpatient Medications on File Prior to Visit   Medication Sig   • amitriptyline (ELAVIL) 25 MG tablet Take 25 mg by mouth daily.   • atenolol (TENORMIN) 25 MG tablet Take 25 mg by mouth daily.   • atorvastatin (LIPITOR) 20 MG tablet Take 20 mg by mouth Daily.   • Budesonide-Formoterol Fumarate (SYMBICORT IN) Inhale 2 puffs 2 (two) times a day as needed.   • Calcium Carbonate-Vitamin D (CALCIUM 600+D3 PO) Take  by mouth.   • levothyroxine sodium (TIROSINT) 125 MCG capsule capsule Take 125 mcg by mouth Daily.   • lisinopril-hydrochlorothiazide (PRINZIDE,ZESTORETIC) 10-12.5 MG per tablet Take 1 tablet by mouth daily.   • omeprazole (PriLOSEC) 20 MG capsule Take 20 mg by mouth 2 (two) times a day.   • potassium chloride (K-DUR) 10 MEQ CR tablet Take 10 mEq by mouth daily.   • tolterodine LA (DETROL LA) 4 MG 24 hr capsule Take 4 mg by mouth daily.   • topiramate (TOPAMAX) 50 MG tablet Take 50 mg by mouth daily.   • vitamin B-12 (CYANOCOBALAMIN) 1000 MCG tablet Take 1,000 mcg by mouth Daily.   • [DISCONTINUED] cephalexin (KEFLEX) 500 MG capsule Take 500 mg by mouth 4 (Four) Times a Day.     No current facility-administered medications on file prior to visit.          The following portions of the patient's history were reviewed and updated as appropriate: allergies, current medications, past family history, past medical  "history, past social history, past surgical history and problem list.    Review of Systems   Constitution: Negative.   HENT: Negative.  Negative for congestion.    Eyes: Negative.    Cardiovascular: Negative.  Negative for chest pain, cyanosis, dyspnea on exertion, irregular heartbeat, leg swelling, near-syncope, orthopnea, palpitations, paroxysmal nocturnal dyspnea and syncope.   Respiratory: Negative.  Negative for shortness of breath.    Hematologic/Lymphatic: Negative.    Musculoskeletal: Negative.    Gastrointestinal: Negative.    Neurological: Negative.  Negative for headaches.          Objective:     /88 (BP Location: Left arm, Patient Position: Sitting, Cuff Size: Adult)   Pulse 60   Ht 160 cm (62.99\")   Wt 95.2 kg (209 lb 12.8 oz)   SpO2 97%   Breastfeeding? No   BMI 37.17 kg/m²   Physical Exam   Constitutional: She appears well-developed and well-nourished. No distress.   HENT:   Head: Normocephalic and atraumatic.   Mouth/Throat: Oropharynx is clear and moist. No oropharyngeal exudate.   Eyes: Conjunctivae and EOM are normal. Pupils are equal, round, and reactive to light. No scleral icterus.   Neck: Normal range of motion. Neck supple. No JVD present. No tracheal deviation present. No thyromegaly present.   Cardiovascular: Normal rate, regular rhythm, normal heart sounds and intact distal pulses. PMI is not displaced. Exam reveals no gallop, no friction rub and no decreased pulses.   No murmur heard.  Pulses:       Carotid pulses are 3+ on the right side, and 3+ on the left side.       Radial pulses are 3+ on the right side, and 3+ on the left side.   Pulmonary/Chest: Effort normal and breath sounds normal. No respiratory distress. She has no wheezes. She has no rales. She exhibits no tenderness.   Abdominal: Soft. Bowel sounds are normal. She exhibits no distension, no abdominal bruit and no mass. There is no splenomegaly or hepatomegaly. There is no tenderness. There is no rebound and no " guarding.   Musculoskeletal: Normal range of motion. She exhibits no edema, tenderness or deformity.   Lymphadenopathy:     She has no cervical adenopathy.   Neurological: She is alert. She has normal reflexes. No cranial nerve deficit. She exhibits normal muscle tone. Coordination normal.   Skin: Skin is warm and dry. No rash noted. She is not diaphoretic. No erythema.   Psychiatric: She has a normal mood and affect. Her behavior is normal. Judgment and thought content normal.         Lab Review    Labs reviewed and discussed with the patient  Lipid panel, CBC and CMP is normal the low TSH is elevated.  Procedures       I personally viewed and interpreted the patient's LAB data         Assessment:     1. Mixed hyperlipidemia    2. Essential hypertension    3. Class 2 obesity due to excess calories without serious comorbidity with body mass index (BMI) of 36.0 to 36.9 in adult          Plan:     Hyperlipidemia is very well controlled patient will continue medications under the direction of her PCP she is taking Lipitor 20 mg daily.  Blood pressure is also very well controlled with the atenolol and lisinopril HCT.  Healthy lifestyle and further weight loss was emphasized.    Patient has history of trivial pericardial effusion but she is doing very well and is asymptomatic and does not wish to have any follow-up at this time.  Patient will be reevaluated in 6 months.    No Follow-up on file.

## 2020-03-10 ENCOUNTER — OFFICE VISIT (OUTPATIENT)
Dept: CARDIOLOGY | Facility: CLINIC | Age: 70
End: 2020-03-10

## 2020-03-10 VITALS
HEIGHT: 63 IN | HEART RATE: 68 BPM | SYSTOLIC BLOOD PRESSURE: 128 MMHG | WEIGHT: 215.4 LBS | DIASTOLIC BLOOD PRESSURE: 80 MMHG | BODY MASS INDEX: 38.16 KG/M2 | OXYGEN SATURATION: 99 %

## 2020-03-10 DIAGNOSIS — E05.90 HYPERTHYROIDISM: ICD-10-CM

## 2020-03-10 DIAGNOSIS — E03.4 HYPOTHYROIDISM DUE TO ACQUIRED ATROPHY OF THYROID: ICD-10-CM

## 2020-03-10 DIAGNOSIS — E78.2 MIXED HYPERLIPIDEMIA: ICD-10-CM

## 2020-03-10 DIAGNOSIS — E66.09 CLASS 2 OBESITY DUE TO EXCESS CALORIES WITHOUT SERIOUS COMORBIDITY WITH BODY MASS INDEX (BMI) OF 36.0 TO 36.9 IN ADULT: ICD-10-CM

## 2020-03-10 DIAGNOSIS — I10 ESSENTIAL HYPERTENSION: Primary | ICD-10-CM

## 2020-03-10 PROCEDURE — 99214 OFFICE O/P EST MOD 30 MIN: CPT | Performed by: INTERNAL MEDICINE

## 2020-03-10 RX ORDER — CYCLOBENZAPRINE HCL 5 MG
5 TABLET ORAL 3 TIMES DAILY PRN
COMMUNITY
End: 2021-10-05

## 2020-03-10 RX ORDER — NITROGLYCERIN 0.4 MG/1
TABLET SUBLINGUAL
COMMUNITY
Start: 2020-03-09

## 2020-03-10 NOTE — PROGRESS NOTES
subjective     Chief Complaint   Patient presents with   • Hypertension     pt doing ok, no complaints   • Hyperlipidemia     History of Present Illness  Patient is 70 years old white female who is here for cardiology follow-up.  Patient states that her blood pressure is doing very well with current medications.  She has no drug side effects she is taking medications regularly.  She is taking lisinopril 10/12.5 daily along with atenolol.  She has hyperlipidemia and is taking Lipitor 20 mg daily without drug side effects.  Heart rate is controlled with atenolol.    Patient is overweight and has been trying to lose weight.    Past Surgical History:   Procedure Laterality Date   • APPENDECTOMY     • CARDIAC CATHETERIZATION  2008   • CARDIOVASCULAR STRESS TEST  09/2014   • CATARACT EXTRACTION, BILATERAL Bilateral     2015 right eye, Dr Hauser  and  2/2017 left eye Dr Meyer   • ECHO - CONVERTED  09/2014   • GALLBLADDER SURGERY     • HERNIA REPAIR     • TUBAL ABDOMINAL LIGATION       Family History   Problem Relation Age of Onset   • Heart disease Mother    • Diabetes Mother    • Heart attack Mother         cause of death   • Cirrhosis Father         cause of death   • Cancer Sister         liver ca   • Cancer Brother         lung ca   • Cancer Brother         colon ca   • Heart disease Brother    • Heart failure Brother    • Cancer Sister         lung ca   • Heart failure Sister    • Cancer Sister      Past Medical History:   Diagnosis Date   • Arthritis    • Chest pain    • COPD (chronic obstructive pulmonary disease) (CMS/HCC)    • GERD (gastroesophageal reflux disease)    • Hyperlipidemia    • Hypertension    • Hypokalemia    • Hypothyroidism    • Pericardial effusion, trivial      Patient Active Problem List   Diagnosis   • Pericardial effusion*   • Hypertension*   • Hyperlipidemia*   • COPD (chronic obstructive pulmonary disease)*   • Class 2 obesity due to excess calories without serious comorbidity with body mass  index (BMI) of 36.0 to 36.9 in adult   • Hyperthyroidism       Social History     Tobacco Use   • Smoking status: Former Smoker     Packs/day: 2.00     Years: 30.00     Pack years: 60.00     Types: Cigarettes     Last attempt to quit: 1980     Years since quittin.2   • Smokeless tobacco: Never Used   Substance Use Topics   • Alcohol use: No   • Drug use: No       Allergies   Allergen Reactions   • Codeine        Current Outpatient Medications on File Prior to Visit   Medication Sig   • amitriptyline (ELAVIL) 25 MG tablet Take 25 mg by mouth daily.   • atenolol (TENORMIN) 25 MG tablet Take 25 mg by mouth daily.   • atorvastatin (LIPITOR) 20 MG tablet Take 20 mg by mouth Daily.   • Budesonide-Formoterol Fumarate (SYMBICORT IN) Inhale 2 puffs 2 (two) times a day as needed.   • Calcium Carbonate-Vitamin D (CALCIUM 600+D3 PO) Take  by mouth.   • cyclobenzaprine (FLEXERIL) 5 MG tablet Take 5 mg by mouth 3 (Three) Times a Day As Needed for Muscle Spasms.   • levothyroxine sodium (TIROSINT) 125 MCG capsule capsule Take 125 mcg by mouth Daily.   • lisinopril-hydrochlorothiazide (PRINZIDE,ZESTORETIC) 10-12.5 MG per tablet Take 1 tablet by mouth daily.   • nitroglycerin (NITROSTAT) 0.4 MG SL tablet    • omeprazole (PriLOSEC) 20 MG capsule Take 20 mg by mouth 2 (two) times a day.   • potassium chloride (K-DUR) 10 MEQ CR tablet Take 10 mEq by mouth daily.   • tolterodine LA (DETROL LA) 4 MG 24 hr capsule Take 4 mg by mouth daily.   • topiramate (TOPAMAX) 50 MG tablet Take 50 mg by mouth daily.   • vitamin B-12 (CYANOCOBALAMIN) 1000 MCG tablet Take 1,000 mcg by mouth Daily.     No current facility-administered medications on file prior to visit.          The following portions of the patient's history were reviewed and updated as appropriate: allergies, current medications, past family history, past medical history, past social history, past surgical history and problem list.    Review of Systems   Constitution: Negative.    "  HENT: Negative.  Negative for congestion.    Eyes: Negative.    Cardiovascular: Negative.  Negative for chest pain, cyanosis, dyspnea on exertion, irregular heartbeat, leg swelling, near-syncope, orthopnea, palpitations, paroxysmal nocturnal dyspnea and syncope.   Respiratory: Negative.  Negative for shortness of breath.    Hematologic/Lymphatic: Negative.    Musculoskeletal: Negative.    Gastrointestinal: Negative.    Neurological: Negative.  Negative for headaches.          Objective:     /80 (BP Location: Left arm, Patient Position: Sitting, Cuff Size: Adult)   Pulse 68   Ht 160 cm (63\")   Wt 97.7 kg (215 lb 6.4 oz)   SpO2 99%   BMI 38.16 kg/m²   Physical Exam   Constitutional: She appears well-developed and well-nourished. No distress.   HENT:   Head: Normocephalic and atraumatic.   Mouth/Throat: Oropharynx is clear and moist. No oropharyngeal exudate.   Eyes: Pupils are equal, round, and reactive to light. Conjunctivae and EOM are normal. No scleral icterus.   Neck: Normal range of motion. Neck supple. No JVD present. No tracheal deviation present. No thyromegaly present.   Cardiovascular: Normal rate, regular rhythm, normal heart sounds and intact distal pulses. PMI is not displaced. Exam reveals no gallop, no friction rub and no decreased pulses.   No murmur heard.  Pulses:       Carotid pulses are 3+ on the right side, and 3+ on the left side.       Radial pulses are 3+ on the right side, and 3+ on the left side.   Pulmonary/Chest: Effort normal and breath sounds normal. No respiratory distress. She has no wheezes. She has no rales. She exhibits no tenderness.   Abdominal: Soft. Bowel sounds are normal. She exhibits no distension, no abdominal bruit and no mass. There is no splenomegaly or hepatomegaly. There is no tenderness. There is no rebound and no guarding.   Musculoskeletal: Normal range of motion. She exhibits no edema, tenderness or deformity.   Lymphadenopathy:     She has no cervical " adenopathy.   Neurological: She is alert. She has normal reflexes. No cranial nerve deficit. She exhibits normal muscle tone. Coordination normal.   Skin: Skin is warm and dry. No rash noted. She is not diaphoretic. No erythema.   Psychiatric: She has a normal mood and affect. Her behavior is normal. Judgment and thought content normal.         Lab Review          Procedures       I personally viewed and interpreted the patient's LAB data         Assessment:     1. Essential hypertension    2. Mixed hyperlipidemia    3. Hypothyroidism due to acquired atrophy of thyroid    4. Class 2 obesity due to excess calories without serious comorbidity with body mass index (BMI) of 36.0 to 36.9 in adult    5. Hyperthyroidism          Plan:     Patient is doing very well blood pressure is very well controlled.  No change in therapy was made.  Lipid panel is also normal she will continue Lipitor    Patient is hyperthyroid with slightly low TSH and elevated free T4  Free T4 is 1.87 and TSH is 0.114  She is doing very well with atenolol.      She may need propylthiouracil later on if thyroid functions get worse.  Follow-up scheduled      No follow-ups on file.

## 2020-09-08 ENCOUNTER — OFFICE VISIT (OUTPATIENT)
Dept: CARDIOLOGY | Facility: CLINIC | Age: 70
End: 2020-09-08

## 2020-09-08 VITALS
HEIGHT: 63 IN | DIASTOLIC BLOOD PRESSURE: 70 MMHG | TEMPERATURE: 98 F | BODY MASS INDEX: 37.03 KG/M2 | WEIGHT: 209 LBS | SYSTOLIC BLOOD PRESSURE: 120 MMHG | HEART RATE: 73 BPM | OXYGEN SATURATION: 98 %

## 2020-09-08 DIAGNOSIS — E78.2 MIXED HYPERLIPIDEMIA: ICD-10-CM

## 2020-09-08 DIAGNOSIS — R06.02 SHORTNESS OF BREATH: ICD-10-CM

## 2020-09-08 DIAGNOSIS — E66.09 CLASS 2 OBESITY DUE TO EXCESS CALORIES WITHOUT SERIOUS COMORBIDITY WITH BODY MASS INDEX (BMI) OF 36.0 TO 36.9 IN ADULT: ICD-10-CM

## 2020-09-08 DIAGNOSIS — E03.9 ACQUIRED HYPOTHYROIDISM: ICD-10-CM

## 2020-09-08 DIAGNOSIS — I10 ESSENTIAL HYPERTENSION: Primary | ICD-10-CM

## 2020-09-08 PROCEDURE — 93000 ELECTROCARDIOGRAM COMPLETE: CPT | Performed by: INTERNAL MEDICINE

## 2020-09-08 PROCEDURE — 99213 OFFICE O/P EST LOW 20 MIN: CPT | Performed by: INTERNAL MEDICINE

## 2020-09-08 RX ORDER — LISINOPRIL 10 MG/1
10 TABLET ORAL NIGHTLY
COMMUNITY
Start: 2020-06-27

## 2020-09-08 RX ORDER — LEVOTHYROXINE SODIUM 112 UG/1
112 TABLET ORAL DAILY
COMMUNITY
Start: 2020-08-10

## 2020-09-08 RX ORDER — IBUPROFEN 600 MG/1
TABLET ORAL
COMMUNITY
Start: 2020-06-09

## 2020-09-08 NOTE — PROGRESS NOTES
subjective     Chief Complaint   Patient presents with   • Hypertension     Follow up,  pt states she is doing well   • Hyperlipidemia     follow up, pt had bloodwork performed     History of Present Illness    Patient is 70 years old white female who is here for cardiology follow-up.  She states that she is doing very well without any new complaints.  Blood pressure according to her is very well controlled.  She is taking atenolol 25 mg daily and lisinopril 10 mg daily    She also has hyperlipidemia and has been taking Lipitor 20 mg daily.    She also has hypothyroidism and is taking Synthroid 112 mcg daily.  She has not lost any weight.  Overall she seems to be doing very well.  She complains of shortness of breath however she has COPD and is obese.  She is taking Symbicort  Past Surgical History:   Procedure Laterality Date   • APPENDECTOMY     • CARDIAC CATHETERIZATION  2008   • CARDIOVASCULAR STRESS TEST  09/2014   • CATARACT EXTRACTION, BILATERAL Bilateral     2015 right eye, Dr Hauser  and  2/2017 left eye Dr Meyer   • ECHO - CONVERTED  09/2014   • GALLBLADDER SURGERY     • HERNIA REPAIR     • TUBAL ABDOMINAL LIGATION       Family History   Problem Relation Age of Onset   • Heart disease Mother    • Diabetes Mother    • Heart attack Mother         cause of death   • Cirrhosis Father         cause of death   • Cancer Sister         liver ca   • Cancer Brother         lung ca   • Cancer Brother         colon ca   • Heart disease Brother    • Heart failure Brother    • Cancer Sister         lung ca   • Heart failure Sister    • Cancer Sister      Past Medical History:   Diagnosis Date   • Arthritis    • Chest pain    • COPD (chronic obstructive pulmonary disease) (CMS/HCC)    • GERD (gastroesophageal reflux disease)    • Hyperlipidemia    • Hypertension    • Hypokalemia    • Hypothyroidism    • Pericardial effusion, trivial      Patient Active Problem List   Diagnosis   • Pericardial effusion*   • Hypertension*      • Hyperlipidemia*   • COPD (chronic obstructive pulmonary disease)*   • Class 2 obesity due to excess calories without serious comorbidity with body mass index (BMI) of 36.0 to 36.9 in adult   • Acquired hypothyroidism   • Shortness of breath       Social History     Tobacco Use   • Smoking status: Former Smoker     Packs/day: 2.00     Years: 30.00     Pack years: 60.00     Types: Cigarettes     Last attempt to quit: 1980     Years since quittin.7   • Smokeless tobacco: Never Used   Substance Use Topics   • Alcohol use: No   • Drug use: No       Allergies   Allergen Reactions   • Codeine        Current Outpatient Medications on File Prior to Visit   Medication Sig   • amitriptyline (ELAVIL) 25 MG tablet Take 25 mg by mouth daily.   • atenolol (TENORMIN) 25 MG tablet Take 25 mg by mouth daily.   • atorvastatin (LIPITOR) 20 MG tablet Take 20 mg by mouth Daily.   • Budesonide-Formoterol Fumarate (SYMBICORT IN) Inhale 2 puffs 2 (two) times a day as needed.   • Calcium Carbonate-Vitamin D (CALCIUM 600+D3 PO) Take  by mouth.   • cyclobenzaprine (FLEXERIL) 5 MG tablet Take 5 mg by mouth 3 (Three) Times a Day As Needed for Muscle Spasms.   • ibuprofen (ADVIL,MOTRIN) 600 MG tablet    • levothyroxine (SYNTHROID, LEVOTHROID) 112 MCG tablet Take 112 mcg by mouth Daily.   • lisinopril (PRINIVIL,ZESTRIL) 10 MG tablet Take 10 mg by mouth Daily.   • nitroglycerin (NITROSTAT) 0.4 MG SL tablet    • omeprazole (PriLOSEC) 20 MG capsule Take 20 mg by mouth 2 (two) times a day.   • potassium chloride (K-DUR) 10 MEQ CR tablet Take 10 mEq by mouth daily.   • tolterodine LA (DETROL LA) 4 MG 24 hr capsule Take 4 mg by mouth daily.   • topiramate (TOPAMAX) 50 MG tablet Take 50 mg by mouth daily.   • vitamin B-12 (CYANOCOBALAMIN) 1000 MCG tablet Take 1,000 mcg by mouth Daily.   • [DISCONTINUED] levothyroxine sodium (TIROSINT) 125 MCG capsule capsule Take 112 mcg by mouth Daily.   • [DISCONTINUED] lisinopril-hydrochlorothiazide  "(PRINZIDE,ZESTORETIC) 10-12.5 MG per tablet Take 1 tablet by mouth daily.     No current facility-administered medications on file prior to visit.          The following portions of the patient's history were reviewed and updated as appropriate: allergies, current medications, past family history, past medical history, past social history, past surgical history and problem list.    Review of Systems   Constitution: Negative.   HENT: Negative.  Negative for congestion.    Eyes: Negative.    Cardiovascular: Negative.  Negative for chest pain, cyanosis, dyspnea on exertion, irregular heartbeat, leg swelling, near-syncope, orthopnea, palpitations, paroxysmal nocturnal dyspnea and syncope.   Respiratory: Negative.  Negative for shortness of breath.    Hematologic/Lymphatic: Negative.    Musculoskeletal: Negative.    Gastrointestinal: Negative.    Neurological: Negative.  Negative for headaches.          Objective:     /70 (BP Location: Left arm, Patient Position: Sitting, Cuff Size: Adult)   Pulse 73   Temp 98 °F (36.7 °C)   Ht 160 cm (63\")   Wt 94.8 kg (209 lb)   SpO2 98%   BMI 37.02 kg/m²   Physical Exam   Constitutional: She appears well-developed and well-nourished. No distress.   HENT:   Head: Normocephalic and atraumatic.   Mouth/Throat: Oropharynx is clear and moist. No oropharyngeal exudate.   Eyes: Pupils are equal, round, and reactive to light. Conjunctivae and EOM are normal. No scleral icterus.   Neck: Normal range of motion. Neck supple. No JVD present. No tracheal deviation present. No thyromegaly present.   Cardiovascular: Normal rate, regular rhythm, normal heart sounds and intact distal pulses. PMI is not displaced. Exam reveals no gallop, no friction rub and no decreased pulses.   No murmur heard.  Pulses:       Carotid pulses are 3+ on the right side, and 3+ on the left side.       Radial pulses are 3+ on the right side, and 3+ on the left side.   Pulmonary/Chest: Effort normal and breath " sounds normal. No respiratory distress. She has no wheezes. She has no rales. She exhibits no tenderness.   Abdominal: Soft. Bowel sounds are normal. She exhibits no distension, no abdominal bruit and no mass. There is no splenomegaly or hepatomegaly. There is no tenderness. There is no rebound and no guarding.   Musculoskeletal: Normal range of motion. She exhibits no edema, tenderness or deformity.   Lymphadenopathy:     She has no cervical adenopathy.   Neurological: She is alert. She has normal reflexes. No cranial nerve deficit. She exhibits normal muscle tone. Coordination normal.   Skin: Skin is warm and dry. No rash noted. She is not diaphoretic. No erythema.   Psychiatric: She has a normal mood and affect. Her behavior is normal. Judgment and thought content normal.         Lab Review          ECG 12 Lead  Date/Time: 9/8/2020 4:12 PM  Performed by: Dalia Agosto MD  Authorized by: Dalia Agosto MD   Comparison: compared with previous ECG from 9/14/2018  Similar to previous ECG  Rhythm: sinus rhythm  Rate: normal  BPM: 73  QRS axis: normal  Other findings: non-specific ST-T wave changes    Clinical impression: normal ECG               I personally viewed and interpreted the patient's LAB data         Assessment:     1. Essential hypertension    2. Mixed hyperlipidemia    3. Class 2 obesity due to excess calories without serious comorbidity with body mass index (BMI) of 36.0 to 36.9 in adult    4. Acquired hypothyroidism    5. Shortness of breath          Plan:     Lab work reviewed and discussed with the patient  Lipid panel is normal with LDL of 77.  Thyroid functions are also normal with TSH 1.1 and free T4 1.42    She is doing very well from cardiac standpoint.    Weight loss was emphasized.  Blood pressure is very well controlled.  No change in therapy follow-up scheduled  Healthy lifestyle discussed    No follow-ups on file.

## 2021-04-05 ENCOUNTER — OFFICE VISIT (OUTPATIENT)
Dept: CARDIOLOGY | Facility: CLINIC | Age: 71
End: 2021-04-05

## 2021-04-05 VITALS
HEIGHT: 63 IN | SYSTOLIC BLOOD PRESSURE: 108 MMHG | DIASTOLIC BLOOD PRESSURE: 68 MMHG | OXYGEN SATURATION: 99 % | WEIGHT: 215 LBS | HEART RATE: 69 BPM | BODY MASS INDEX: 38.09 KG/M2 | TEMPERATURE: 98.3 F

## 2021-04-05 DIAGNOSIS — E78.2 MIXED HYPERLIPIDEMIA: ICD-10-CM

## 2021-04-05 DIAGNOSIS — E66.01 SEVERE OBESITY (BMI 35.0-39.9) WITH COMORBIDITY (HCC): ICD-10-CM

## 2021-04-05 DIAGNOSIS — I10 ESSENTIAL HYPERTENSION: Primary | ICD-10-CM

## 2021-04-05 PROCEDURE — 99214 OFFICE O/P EST MOD 30 MIN: CPT | Performed by: INTERNAL MEDICINE

## 2021-04-05 NOTE — PROGRESS NOTES
subjective     Chief Complaint   Patient presents with   • Results     labs pt doing well   • Hypertension     Follow up   • Hyperlipidemia     Follow up     History of Present Illness  Danay is 71 years old white female who is here for cardiology follow-up.  She states that she has been doing very well her blood pressure apparently was high and lisinopril dose was increased by Dr. Russo.    Hypothyroidism is controlled with current dose of Synthroid 112 mcg daily.  She denies any palpitations.      Heart rate is controlled with atenolol 25 mg daily.    Hyperlipidemia on Lipitor 20 mg daily.  She has been trying to lose weight but has not been very successful.    Shortness of breath is her major problem she has COPD and is on Symbicort therapy.    She denies any chest pain palpitations.  No orthopnea PND or ankle edema.  She has prior history of trivial pericardial effusion but has had no complaints.      Past Surgical History:   Procedure Laterality Date   • APPENDECTOMY     • CARDIAC CATHETERIZATION  2008   • CARDIOVASCULAR STRESS TEST  09/2014   • CATARACT EXTRACTION, BILATERAL Bilateral     2015 right eye, Dr Hauser  and  2/2017 left eye Dr Meyer   • ECHO - CONVERTED  09/2014   • GALLBLADDER SURGERY     • HERNIA REPAIR     • TUBAL ABDOMINAL LIGATION       Family History   Problem Relation Age of Onset   • Heart disease Mother    • Diabetes Mother    • Heart attack Mother         cause of death   • Cirrhosis Father         cause of death   • Cancer Sister         liver ca   • Cancer Brother         lung ca   • Cancer Brother         colon ca   • Heart disease Brother    • Heart failure Brother    • Cancer Sister         lung ca   • Heart failure Sister    • Cancer Sister      Past Medical History:   Diagnosis Date   • Arthritis    • Chest pain    • COPD (chronic obstructive pulmonary disease) (CMS/HCC)    • GERD (gastroesophageal reflux disease)    • Hyperlipidemia    • Hypertension    • Hypokalemia    •  Hypothyroidism    • Pericardial effusion, trivial      Patient Active Problem List   Diagnosis   • Pericardial effusion*   • Hypertension*   • Hyperlipidemia*   • COPD (chronic obstructive pulmonary disease)*   • Severe obesity (BMI 35.0-39.9) with comorbidity (CMS/HCC)   • Acquired hypothyroidism   • Shortness of breath       Social History     Tobacco Use   • Smoking status: Former Smoker     Packs/day: 2.00     Years: 30.00     Pack years: 60.00     Types: Cigarettes     Quit date:      Years since quittin.2   • Smokeless tobacco: Never Used   Substance Use Topics   • Alcohol use: No   • Drug use: No       Allergies   Allergen Reactions   • Codeine        Current Outpatient Medications on File Prior to Visit   Medication Sig   • amitriptyline (ELAVIL) 25 MG tablet Take 25 mg by mouth daily.   • atenolol (TENORMIN) 25 MG tablet Take 25 mg by mouth daily.   • atorvastatin (LIPITOR) 20 MG tablet Take 20 mg by mouth Daily.   • Budesonide-Formoterol Fumarate (SYMBICORT IN) Inhale 2 puffs 2 (two) times a day as needed.   • Calcium Carbonate-Vitamin D (CALCIUM 600+D3 PO) Take  by mouth.   • cyclobenzaprine (FLEXERIL) 5 MG tablet Take 5 mg by mouth 3 (Three) Times a Day As Needed for Muscle Spasms.   • ibuprofen (ADVIL,MOTRIN) 600 MG tablet    • levothyroxine (SYNTHROID, LEVOTHROID) 112 MCG tablet Take 112 mcg by mouth Daily.   • lisinopril (PRINIVIL,ZESTRIL) 10 MG tablet Take 10 mg by mouth Daily.   • nitroglycerin (NITROSTAT) 0.4 MG SL tablet    • omeprazole (PriLOSEC) 20 MG capsule Take 20 mg by mouth 2 (two) times a day.   • potassium chloride (K-DUR) 10 MEQ CR tablet Take 10 mEq by mouth daily.   • tolterodine LA (DETROL LA) 4 MG 24 hr capsule Take 4 mg by mouth daily.   • topiramate (TOPAMAX) 50 MG tablet Take 50 mg by mouth daily.   • vitamin B-12 (CYANOCOBALAMIN) 1000 MCG tablet Take 1,000 mcg by mouth Daily.     No current facility-administered medications on file prior to visit.         The following  "portions of the patient's history were reviewed and updated as appropriate: allergies, current medications, past family history, past medical history, past social history, past surgical history and problem list.    Review of Systems   Constitutional: Negative.   HENT: Negative.  Negative for congestion.    Eyes: Negative.    Cardiovascular: Negative.  Negative for chest pain, cyanosis, dyspnea on exertion, irregular heartbeat, leg swelling, near-syncope, orthopnea, palpitations, paroxysmal nocturnal dyspnea and syncope.   Respiratory: Positive for shortness of breath.    Hematologic/Lymphatic: Negative.    Musculoskeletal: Negative.    Gastrointestinal: Negative.    Neurological: Negative.  Negative for headaches.          Objective:     /68 (BP Location: Left arm, Patient Position: Sitting, Cuff Size: Adult)   Pulse 69   Temp 98.3 °F (36.8 °C)   Ht 160 cm (63\")   Wt 97.5 kg (215 lb)   SpO2 99%   BMI 38.09 kg/m²   Vitals and nursing note reviewed.   Constitutional:       General: Not in acute distress.     Appearance: Healthy appearance. Well-developed and not in distress. Not diaphoretic.   Eyes:      General: No scleral icterus.     Conjunctiva/sclera: Conjunctivae normal.      Pupils: Pupils are equal, round, and reactive to light.   HENT:      Head: Normocephalic and atraumatic.   Neck:      Thyroid: Thyroid normal. No thyromegaly.      Vascular: No JVD. JVD normal.      Trachea: No tracheal deviation.      Lymphadenopathy: No cervical adenopathy.   Pulmonary:      Effort: Pulmonary effort is normal. No respiratory distress.      Breath sounds: Normal breath sounds and air entry.   Chest:      Chest wall: Not tender to palpatation.   Cardiovascular:      PMI at left midclavicular line. Normal rate. Regular rhythm.      No gallop.   Pulses:     Intact distal pulses. No decreased pulses.   Abdominal:      General: Bowel sounds are normal. There is no distension or abdominal bruit.      Palpations: " Abdomen is soft. There is no hepatomegaly, splenomegaly or abdominal mass.      Tenderness: There is no abdominal tenderness. There is no guarding or rebound.   Musculoskeletal:      Cervical back: Normal range of motion and neck supple. Skin:     General: Skin is warm and dry. There is no cyanosis.      Coloration: Skin is not jaundiced or pale.      Findings: No erythema or rash.   Neurological:      Mental Status: Alert, oriented to person, place, and time and oriented to person, place and time.   Psychiatric:         Attention and Perception: Attention normal.         Mood and Affect: Mood and affect normal.         Speech: Speech normal.         Behavior: Behavior is cooperative.           Lab Review        Procedures       I personally viewed and interpreted the patient's LAB data         Assessment:     1. Essential hypertension    2. Mixed hyperlipidemia    3. Severe obesity (BMI 35.0-39.9) with comorbidity (CMS/HCC)          Plan:     Hyperlipidemia    Lab work reviewed and discussed with the patient  Lipid panel is normal, she is being treated with Lipitor 20 mg daily.  Weight loss and healthy lifestyle again emphasized.  She will follow closely with her PCP Roxie HAMMOND.    Hypertension    Blood pressure recently was elevated and her medications have been increased by her PCP.  Blood pressure today is 108/68.  She is totally asymptomatic.  There is no ankle edema.    History of pericardial effusion    Patient is asymptomatic there is no ankle edema no orthopnea or PND.    Obesity  Patient is still quite heavy further weight loss was emphasized.    Hypothyroidism is also very well controlled.    No change in therapy was made.  Patient was advised to follow closely with the her PCP Roxie HAMMOND  Healthy lifestyle and weight loss emphasized.      No follow-ups on file.

## 2021-10-05 ENCOUNTER — OFFICE VISIT (OUTPATIENT)
Dept: CARDIOLOGY | Facility: CLINIC | Age: 71
End: 2021-10-05

## 2021-10-05 VITALS
OXYGEN SATURATION: 98 % | HEART RATE: 56 BPM | BODY MASS INDEX: 37.92 KG/M2 | SYSTOLIC BLOOD PRESSURE: 148 MMHG | HEIGHT: 63 IN | TEMPERATURE: 98.2 F | WEIGHT: 214 LBS | DIASTOLIC BLOOD PRESSURE: 78 MMHG

## 2021-10-05 DIAGNOSIS — R00.1 BRADYCARDIA, SINUS: Primary | ICD-10-CM

## 2021-10-05 DIAGNOSIS — E66.01 SEVERE OBESITY (BMI 35.0-39.9) WITH COMORBIDITY (HCC): ICD-10-CM

## 2021-10-05 DIAGNOSIS — R06.02 SHORTNESS OF BREATH: ICD-10-CM

## 2021-10-05 DIAGNOSIS — E78.2 MIXED HYPERLIPIDEMIA: ICD-10-CM

## 2021-10-05 DIAGNOSIS — I10 PRIMARY HYPERTENSION: ICD-10-CM

## 2021-10-05 DIAGNOSIS — R00.1 BRADYCARDIA, SINUS: ICD-10-CM

## 2021-10-05 PROCEDURE — 99214 OFFICE O/P EST MOD 30 MIN: CPT | Performed by: INTERNAL MEDICINE

## 2021-10-05 PROCEDURE — 93000 ELECTROCARDIOGRAM COMPLETE: CPT | Performed by: INTERNAL MEDICINE

## 2021-10-05 NOTE — PROGRESS NOTES
subjective     Chief Complaint   Patient presents with   • Hypertension     Follow up   • Shortness of Breath     History of Present Illness  Danay is 71 years old white female who is here for follow-up.  Blood pressure is very well controlled.  She is taking lisinopril and atenolol.  Patient also has hyperlipidemia and has been taking Lipitor without any drug side effects.  Hypothyroidism on Synthroid replacement therapy.  She is overweight and has been trying to lose weight.  At this time she denies any chest pain or palpitations but complains of being out of breath with exertion.  No orthopnea PND or ankle edema.    Past Surgical History:   Procedure Laterality Date   • APPENDECTOMY     • CARDIAC CATHETERIZATION  2008   • CARDIOVASCULAR STRESS TEST  09/2014   • CATARACT EXTRACTION, BILATERAL Bilateral     2015 right eye, Dr Hauser  and  2/2017 left eye Dr Meyer   • ECHO - CONVERTED  09/2014   • GALLBLADDER SURGERY     • HERNIA REPAIR     • TUBAL ABDOMINAL LIGATION       Family History   Problem Relation Age of Onset   • Heart disease Mother    • Diabetes Mother    • Heart attack Mother         cause of death   • Cirrhosis Father         cause of death   • Cancer Sister         liver ca   • Cancer Brother         lung ca   • Cancer Brother         colon ca   • Heart disease Brother    • Heart failure Brother    • Cancer Sister         lung ca   • Heart failure Sister    • Cancer Sister      Past Medical History:   Diagnosis Date   • Arthritis    • Chest pain    • COPD (chronic obstructive pulmonary disease) (HCC)    • GERD (gastroesophageal reflux disease)    • Hyperlipidemia    • Hypertension    • Hypokalemia    • Hypothyroidism    • Pericardial effusion, trivial      Patient Active Problem List   Diagnosis   • Pericardial effusion*   • Hypertension*   • Hyperlipidemia*   • COPD (chronic obstructive pulmonary disease)*   • Severe obesity (BMI 35.0-39.9) with comorbidity (HCC)   • Acquired hypothyroidism   •  Shortness of breath   • Bradycardia, sinus       Social History     Tobacco Use   • Smoking status: Former Smoker     Packs/day: 2.00     Years: 30.00     Pack years: 60.00     Types: Cigarettes     Quit date:      Years since quittin.7   • Smokeless tobacco: Never Used   Substance Use Topics   • Alcohol use: No   • Drug use: No       Allergies   Allergen Reactions   • Codeine        Current Outpatient Medications on File Prior to Visit   Medication Sig   • amitriptyline (ELAVIL) 25 MG tablet Take 25 mg by mouth daily.   • atenolol (TENORMIN) 25 MG tablet Take 25 mg by mouth daily.   • atorvastatin (LIPITOR) 20 MG tablet Take 20 mg by mouth Daily.   • Budesonide-Formoterol Fumarate (SYMBICORT IN) Inhale 2 puffs 2 (two) times a day as needed.   • Calcium Carbonate-Vitamin D (CALCIUM 600+D3 PO) Take  by mouth.   • ibuprofen (ADVIL,MOTRIN) 600 MG tablet    • levothyroxine (SYNTHROID, LEVOTHROID) 112 MCG tablet Take 112 mcg by mouth Daily.   • lisinopril (PRINIVIL,ZESTRIL) 10 MG tablet Take 10 mg by mouth Daily.   • nitroglycerin (NITROSTAT) 0.4 MG SL tablet    • omeprazole (PriLOSEC) 20 MG capsule Take 20 mg by mouth 2 (two) times a day.   • potassium chloride (K-DUR) 10 MEQ CR tablet Take 10 mEq by mouth daily.   • tolterodine LA (DETROL LA) 4 MG 24 hr capsule Take 4 mg by mouth daily.   • topiramate (TOPAMAX) 50 MG tablet Take 50 mg by mouth daily.   • vitamin B-12 (CYANOCOBALAMIN) 1000 MCG tablet Take 1,000 mcg by mouth Daily.   • [DISCONTINUED] cyclobenzaprine (FLEXERIL) 5 MG tablet Take 5 mg by mouth 3 (Three) Times a Day As Needed for Muscle Spasms.     No current facility-administered medications on file prior to visit.         The following portions of the patient's history were reviewed and updated as appropriate: allergies, current medications, past family history, past medical history, past social history, past surgical history and problem list.    Review of Systems   Constitutional: Negative.  "  HENT: Negative.  Negative for congestion.    Eyes: Negative.    Cardiovascular: Positive for dyspnea on exertion. Negative for chest pain, cyanosis, irregular heartbeat, leg swelling, near-syncope, orthopnea, palpitations, paroxysmal nocturnal dyspnea and syncope.   Respiratory: Negative.  Negative for shortness of breath.    Hematologic/Lymphatic: Negative.    Musculoskeletal: Negative.    Gastrointestinal: Negative.    Neurological: Negative.  Negative for headaches.          Objective:     /78 (BP Location: Left arm, Patient Position: Sitting, Cuff Size: Adult)   Pulse 56   Temp 98.2 °F (36.8 °C)   Ht 160 cm (63\")   Wt 97.1 kg (214 lb)   SpO2 98%   BMI 37.91 kg/m²   Pulmonary:      Effort: Pulmonary effort is normal.      Breath sounds: Normal breath sounds. No stridor. No wheezing. No rhonchi. No rales.   Cardiovascular:      PMI at left midclavicular line. Normal rate. Regular rhythm. Normal S1. Normal S2.      Murmurs: There is no murmur.      No gallop. No click. No rub.   Pulses:     Intact distal pulses.   Edema:     Peripheral edema absent.           Lab Review            ECG 12 Lead    Date/Time: 10/5/2021 12:28 PM  Performed by: Dalia Agosto MD  Authorized by: Dalia Agosto MD   Comparison: compared with previous ECG from 9/8/2020  Comparison to previous ECG: Bradycardia is new, heart rate was 73 on last EKG  Rhythm: sinus bradycardia  Rate: bradycardic  BPM: 56  Conduction: conduction normal  ST Segments: ST segments normal  QRS axis: normal    Clinical impression: non-specific ECG               I personally viewed and interpreted the patient's LAB data         Assessment:     1. Bradycardia, sinus    2. Shortness of breath    3. Primary hypertension    4. Severe obesity (BMI 35.0-39.9) with comorbidity (HCC)    5. Mixed hyperlipidemia          Plan:     Danay is 71 years old white female who is here for cardiology follow-up.  Patient has history of hypertension blood " pressure is mildly elevated.  She is taking lisinopril 10 mg daily and atenolol 25 mg daily.  Palpitations are better heart rate is slower but patient is asymptomatic.  Heart rate today is around 56 bpm.  She also has hyperlipidemia and has been taking Lipitor 20 mg daily.  With no drug side effects.  Patient is overweight and has been trying to lose weight.  Hypothyroidism on Synthroid replacement therapy.  She was advised to follow closely with her PCP overall she is doing very well  She was instructed to decrease atenolol if heart rate goes any slower.  TSH is borderline low but free T4 is normal.    Healthy lifestyle emphasized follow-up scheduled    No follow-ups on file.

## 2021-12-17 ENCOUNTER — OFFICE VISIT (OUTPATIENT)
Dept: PULMONOLOGY | Facility: CLINIC | Age: 71
End: 2021-12-17

## 2021-12-17 VITALS
OXYGEN SATURATION: 98 % | TEMPERATURE: 97.5 F | HEART RATE: 75 BPM | BODY MASS INDEX: 38.27 KG/M2 | SYSTOLIC BLOOD PRESSURE: 115 MMHG | DIASTOLIC BLOOD PRESSURE: 66 MMHG | HEIGHT: 63 IN | WEIGHT: 216 LBS

## 2021-12-17 DIAGNOSIS — E66.01 SEVERE OBESITY (BMI 35.0-39.9) WITH COMORBIDITY (HCC): ICD-10-CM

## 2021-12-17 DIAGNOSIS — J44.9 CHRONIC OBSTRUCTIVE PULMONARY DISEASE, UNSPECIFIED COPD TYPE (HCC): Primary | ICD-10-CM

## 2021-12-17 PROCEDURE — 99203 OFFICE O/P NEW LOW 30 MIN: CPT | Performed by: NURSE PRACTITIONER

## 2021-12-17 NOTE — PROGRESS NOTES
"Chief Complaint  COPD    Subjective          Caprice Rodriguez presents to [facility] PULMONARY & CRITICAL CARE MEDICINE  History of Present Illness     Ms. Rodriguez is a 71-year-old female with medical history significant for COPD, GERD, hyperlipidemia, hypertension and hypothyroidism.    She presents today to establish care for COPD.  She states that she was diagnosed several years ago.  She does have daily shortness of breath and cough that are worse with exertion.  She reports that her breathing has been at baseline.  She states that she is currently taking Symbicort twice daily and using albuterol every 4 hours as needed.  She also uses neb treatments as needed.  She states that she did try to take Trelegy but that when she started this she started having headaches and chest pain.  She is a former smoker quitting 41 years ago.      Nebs     Objective   Vital Signs:   /66   Pulse 75   Temp 97.5 °F (36.4 °C) (Temporal)   Ht 160 cm (63\")   Wt 98 kg (216 lb)   SpO2 98%   BMI 38.26 kg/m²     Physical Exam     GENERAL APPEARANCE: Well developed, well nourished, alert and cooperative, and appears to be in no acute distress.    HEAD: normocephalic. Atraumatic.    EYES: PERRL, EOMI. Vision is grossly intact.    THROAT: Oral cavity and pharynx normal. No inflammation, swelling, exudate, or lesions.     NECK: Neck supple.  No thyromegaly.    CARDIAC: Normal S1 and S2. No S3, S4 or murmurs. Rhythm is regular.     RESPIRATORY:Bilateral air entry positive. Bilateral diminished breath sounds. No wheezing, crackles or rhonchi noted.    GI: Positive bowel sounds. Soft, nondistended, nontender.     MUSCULOSKELETAL: No significant deformity or joint abnormality. No edema. Peripheral pulses intact. No varicosities.    NEUROLOGICAL: Strength and sensation symmetric and intact throughout.     PSYCHIATRIC: The mental examination revealed the patient was oriented to person, place, and time.     Result Review : "   The following data was reviewed by: STEPHANY Wong on 12/17/2021:               Assessment and Plan    Diagnoses and all orders for this visit:    1. Chronic obstructive pulmonary disease, unspecified COPD type (HCC) (Primary)  -     Full Pulmonary Function Test With Bronchodilator; Future  -     Overnight Sleep Oximetry Study; Future    2. Severe obesity (BMI 35.0-39.9) with comorbidity (HCC)           COPD, unspecified type:  -We will order PFT to assess lung function.  -Chest x-ray was reviewed and discussed with patient.  We will obtain disc of chest x-ray.  -Continue Symbicort twice daily.  -Continue albuterol inhaler and nebs every 4 hours as needed.  -We will order an overnight pulse oximetry study.    Patient's Body mass index is 38.26 kg/m². indicating that she is morbidly obese (BMI > 40 or > 35 with obesity - related health condition). Obesity-related health conditions include the following: hypertension, coronary heart disease, dyslipidemias and GERD. Obesity is unchanged. BMI is is above average; BMI management plan is completed. We discussed portion control and increasing exercise.    Follow Up   Return in about 3 months (around 3/17/2022).  Patient was given instructions and counseling regarding her condition or for health maintenance advice. Please see specific information pulled into the AVS if appropriate.

## 2021-12-20 DIAGNOSIS — Z01.818 OTHER SPECIFIED PRE-OPERATIVE EXAMINATION: Primary | ICD-10-CM

## 2021-12-27 ENCOUNTER — HOSPITAL ENCOUNTER (EMERGENCY)
Facility: HOSPITAL | Age: 71
End: 2021-12-27
Attending: EMERGENCY MEDICINE

## 2021-12-27 ENCOUNTER — LAB (OUTPATIENT)
Dept: LAB | Facility: HOSPITAL | Age: 71
End: 2021-12-27

## 2021-12-27 ENCOUNTER — HOSPITAL ENCOUNTER (OUTPATIENT)
Dept: RESPIRATORY THERAPY | Facility: HOSPITAL | Age: 71
Discharge: HOME | End: 2021-12-27

## 2021-12-27 VITALS
RESPIRATION RATE: 18 BRPM | BODY MASS INDEX: 39.75 KG/M2 | HEART RATE: 87 BPM | WEIGHT: 216 LBS | OXYGEN SATURATION: 94 % | SYSTOLIC BLOOD PRESSURE: 222 MMHG | DIASTOLIC BLOOD PRESSURE: 79 MMHG | HEIGHT: 62 IN | TEMPERATURE: 98.1 F

## 2021-12-27 DIAGNOSIS — Z01.818 OTHER SPECIFIED PRE-OPERATIVE EXAMINATION: ICD-10-CM

## 2021-12-27 DIAGNOSIS — J44.9 CHRONIC OBSTRUCTIVE PULMONARY DISEASE, UNSPECIFIED COPD TYPE (HCC): ICD-10-CM

## 2021-12-27 LAB
FLUAV SUBTYP SPEC NAA+PROBE: NOT DETECTED
FLUBV RNA ISLT QL NAA+PROBE: NOT DETECTED
SARS-COV-2 RNA PNL SPEC NAA+PROBE: NOT DETECTED

## 2021-12-27 PROCEDURE — 94060 EVALUATION OF WHEEZING: CPT

## 2021-12-27 PROCEDURE — 94729 DIFFUSING CAPACITY: CPT

## 2021-12-27 PROCEDURE — 94726 PLETHYSMOGRAPHY LUNG VOLUMES: CPT

## 2021-12-27 PROCEDURE — 94060 EVALUATION OF WHEEZING: CPT | Performed by: INTERNAL MEDICINE

## 2021-12-27 PROCEDURE — 94729 DIFFUSING CAPACITY: CPT | Performed by: INTERNAL MEDICINE

## 2021-12-27 PROCEDURE — 87636 SARSCOV2 & INF A&B AMP PRB: CPT | Performed by: PHYSICIAN ASSISTANT

## 2021-12-27 PROCEDURE — 94726 PLETHYSMOGRAPHY LUNG VOLUMES: CPT | Performed by: INTERNAL MEDICINE

## 2021-12-27 NOTE — ED PROVIDER NOTES
Subjective   History of Present Illness    Review of Systems    Past Medical History:   Diagnosis Date   • Arthritis    • Chest pain    • COPD (chronic obstructive pulmonary disease) (HCC)    • GERD (gastroesophageal reflux disease)    • Hyperlipidemia    • Hypertension    • Hypokalemia    • Hypothyroidism    • Pericardial effusion, trivial        Allergies   Allergen Reactions   • Codeine        Past Surgical History:   Procedure Laterality Date   • APPENDECTOMY     • CARDIAC CATHETERIZATION     • CARDIOVASCULAR STRESS TEST  2014   • CATARACT EXTRACTION, BILATERAL Bilateral      right eye, Dr Hauser  and  2017 left eye Dr Meyer   • ECHO - CONVERTED  2014   • GALLBLADDER SURGERY     • HERNIA REPAIR     • TUBAL ABDOMINAL LIGATION         Family History   Problem Relation Age of Onset   • Heart disease Mother    • Diabetes Mother    • Heart attack Mother         cause of death   • Cirrhosis Father         cause of death   • Cancer Sister         liver ca   • Cancer Brother         lung ca   • Cancer Brother         colon ca   • Heart disease Brother    • Heart failure Brother    • Cancer Sister         lung ca   • Heart failure Sister    • Cancer Sister        Social History     Socioeconomic History   • Marital status:    Tobacco Use   • Smoking status: Former Smoker     Packs/day: 2.00     Types: Cigarettes     Quit date:      Years since quittin.0   • Smokeless tobacco: Never Used   Vaping Use   • Vaping Use: Never used   Substance and Sexual Activity   • Alcohol use: No   • Drug use: No   • Sexual activity: Defer           Objective   Physical Exam    Procedures           ED Course                                                 MDM    Final diagnoses:   None       ED Disposition  ED Disposition     None          No follow-up provider specified.       Medication List      Notice    Cannot display patient medications because the patient has not yet arrived.

## 2021-12-29 ENCOUNTER — DOCUMENTATION (OUTPATIENT)
Dept: PULMONOLOGY | Facility: CLINIC | Age: 71
End: 2021-12-29

## 2021-12-29 NOTE — PROGRESS NOTES
Notify the patient of PFT results.  PFT shows a moderate obstruction with no significant bronchodilator response.  We will continue Symbicort twice daily.

## 2022-03-22 ENCOUNTER — OFFICE VISIT (OUTPATIENT)
Dept: PULMONOLOGY | Facility: CLINIC | Age: 72
End: 2022-03-22

## 2022-03-22 VITALS
HEART RATE: 81 BPM | TEMPERATURE: 97.7 F | HEIGHT: 63 IN | BODY MASS INDEX: 38.09 KG/M2 | SYSTOLIC BLOOD PRESSURE: 126 MMHG | OXYGEN SATURATION: 95 % | DIASTOLIC BLOOD PRESSURE: 86 MMHG | WEIGHT: 215 LBS

## 2022-03-22 DIAGNOSIS — J44.9 CHRONIC OBSTRUCTIVE PULMONARY DISEASE, UNSPECIFIED COPD TYPE: ICD-10-CM

## 2022-03-22 DIAGNOSIS — E66.01 MORBIDLY OBESE: ICD-10-CM

## 2022-03-22 DIAGNOSIS — J41.0 SIMPLE CHRONIC BRONCHITIS: Primary | ICD-10-CM

## 2022-03-22 PROBLEM — R06.02 SHORTNESS OF BREATH: Status: RESOLVED | Noted: 2020-09-08 | Resolved: 2022-03-22

## 2022-03-22 PROCEDURE — 99214 OFFICE O/P EST MOD 30 MIN: CPT | Performed by: NURSE PRACTITIONER

## 2022-03-22 RX ORDER — CELECOXIB 200 MG/1
CAPSULE ORAL
COMMUNITY
Start: 2022-03-03

## 2022-03-22 NOTE — PROGRESS NOTES
"Chief Complaint  COPD    Subjective          Caprice Rodriguez presents to [facility] PULMONARY & CRITICAL CARE MEDICINE  History of Present Illness     Ms. Rodriguez is a 72 year old female with a medical history significant for COPD, GERD, hyperlipidemia, hypertension, and hypothyroidism.      She presents today for a routine follow up on COPD.  She states that she has been doing well since her last visit.  She reports that her breathing is at baseline.  She is currently on Symbicort BID and albuterol every 4 hours as needed.  PFT was completed since her last visit.  She also underwent an overnight pulse oximetry study but we do not have results.       Objective   Vital Signs:   /86   Pulse 81   Temp 97.7 °F (36.5 °C) (Temporal)   Ht 160 cm (63\")   Wt 97.5 kg (215 lb)   SpO2 95%   BMI 38.09 kg/m²     BMI is above normal parameters. Recommendations: exercise counseling/recommendations and nutrition counseling/recommendations       Physical Exam     GENERAL APPEARANCE: Well developed, well nourished, alert and cooperative, and appears to be in no acute distress.    HEAD: normocephalic. Atraumatic.    EYES: PERRL, EOMI. Vision is grossly intact.    THROAT: Oral cavity and pharynx normal. No inflammation, swelling, exudate, or lesions.     NECK: Neck supple.  No thyromegaly.    CARDIAC: Normal S1 and S2. No S3, S4 or murmurs. Rhythm is regular.     RESPIRATORY:Bilateral air entry positive. Bilateral diminished breath sounds. No wheezing, crackles or rhonchi noted.    GI: Positive bowel sounds. Soft, nondistended, nontender.     MUSCULOSKELETAL: No significant deformity or joint abnormality. No edema. Peripheral pulses intact. No varicosities.    NEUROLOGICAL: Strength and sensation symmetric and intact throughout.     PSYCHIATRIC: The mental examination revealed the patient was oriented to person, place, and time.     Result Review :   The following data was reviewed by: Merry Paige, " APRN on 03/22/2022:      Data reviewed: PFT          Assessment and Plan    Diagnoses and all orders for this visit:    1. Simple chronic bronchitis (HCC) (Primary)    2. Morbidly obese (HCC)          Simple chronic Bronchitis:  PFT was reviewed with the patient in detail.  Continue Symbicort BID.  Continue albuterol every 4 hours as needed.     Overnight pulse oximetry was done, but results are not seen in the chart.  Will try to obtain these records.     Patient's Body mass index is 38.09 kg/m². indicating that she is morbidly obese (BMI > 40 or > 35 with obesity - related health condition). Obesity-related health conditions include the following: hypertension and dyslipidemias. Obesity is unchanged. BMI is is above average; BMI management plan is completed. We discussed portion control and increasing exercise..          Follow Up   Return in about 3 months (around 6/22/2022).  Patient was given instructions and counseling regarding her condition or for health maintenance advice. Please see specific information pulled into the AVS if appropriate.

## 2022-03-25 ENCOUNTER — TELEPHONE (OUTPATIENT)
Dept: PULMONOLOGY | Facility: CLINIC | Age: 72
End: 2022-03-25

## 2022-03-25 DIAGNOSIS — J44.9 CHRONIC OBSTRUCTIVE PULMONARY DISEASE, UNSPECIFIED COPD TYPE: Primary | ICD-10-CM

## 2022-03-25 NOTE — PROGRESS NOTES
Overnight pulse oximetry study was reviewed and shows oxygen desaturations at night.  We will start her on supplemental oxygen during sleep.

## 2022-03-25 NOTE — TELEPHONE ENCOUNTER
----- Message from STEPHANY Wong sent at 3/25/2022  9:51 AM EDT -----  Will you let her know that she does need oxygen at night and that I placed the order for this.    ----- Message -----  From: Brenda Kramer Incoming  Sent: 3/24/2022   4:25 PM EDT  To: STEPHANY Wong

## 2022-04-05 ENCOUNTER — OFFICE VISIT (OUTPATIENT)
Dept: CARDIOLOGY | Facility: CLINIC | Age: 72
End: 2022-04-05

## 2022-04-05 VITALS
BODY MASS INDEX: 37.21 KG/M2 | OXYGEN SATURATION: 97 % | HEIGHT: 63 IN | WEIGHT: 210 LBS | SYSTOLIC BLOOD PRESSURE: 100 MMHG | DIASTOLIC BLOOD PRESSURE: 84 MMHG | TEMPERATURE: 97.5 F | HEART RATE: 71 BPM

## 2022-04-05 DIAGNOSIS — I10 PRIMARY HYPERTENSION: ICD-10-CM

## 2022-04-05 DIAGNOSIS — E78.2 MIXED HYPERLIPIDEMIA: Primary | ICD-10-CM

## 2022-04-05 DIAGNOSIS — E03.9 ACQUIRED HYPOTHYROIDISM: ICD-10-CM

## 2022-04-05 PROCEDURE — 99214 OFFICE O/P EST MOD 30 MIN: CPT | Performed by: INTERNAL MEDICINE

## 2022-04-05 RX ORDER — DULOXETIN HYDROCHLORIDE 30 MG/1
30 CAPSULE, DELAYED RELEASE ORAL EVERY MORNING
COMMUNITY
Start: 2022-03-03 | End: 2022-10-04 | Stop reason: ALTCHOICE

## 2022-04-05 RX ORDER — LISINOPRIL AND HYDROCHLOROTHIAZIDE 12.5; 1 MG/1; MG/1
1 TABLET ORAL EVERY MORNING
COMMUNITY
Start: 2022-01-18

## 2022-04-05 NOTE — PROGRESS NOTES
subjective     Chief Complaint   Patient presents with   • Slow Heart Rate     Follow up   • Hypertension     Follow up     History of Present Illness  Patient is 72 years old white female who has history of hypertension, hyperlipidemia, hypothyroidism and prior history of pericardial effusion.  She is doing very well she denies any chest pain palpitations or shortness of breath.  She had lab work done recently which will be reviewed.    She is taking Synthroid 112 mcg daily and thyroid functions have been normal.    Blood pressure is controlled with lisinopril, hydrochlorothiazide and atenolol    Hyperlipidemia on Lipitor therapy.  She is also diabetic, hemoglobin A1c was 6.5.  Renal functions are normal.    She also has history of sinus bradycardia because of atenolol therapy, heart rate is now running around 70 bpm    Past Surgical History:   Procedure Laterality Date   • APPENDECTOMY     • CARDIAC CATHETERIZATION  2008   • CARDIOVASCULAR STRESS TEST  09/2014   • CATARACT EXTRACTION, BILATERAL Bilateral     2015 right eye, Dr Hauser  and  2/2017 left eye Dr Meyer   • ECHO - CONVERTED  09/2014   • GALLBLADDER SURGERY     • HERNIA REPAIR     • TUBAL ABDOMINAL LIGATION       Family History   Problem Relation Age of Onset   • Heart disease Mother    • Diabetes Mother    • Heart attack Mother         cause of death   • Cirrhosis Father         cause of death   • Cancer Sister         liver ca   • Cancer Brother         lung ca   • Cancer Brother         colon ca   • Heart disease Brother    • Heart failure Brother    • Cancer Sister         lung ca   • Heart failure Sister    • Cancer Sister      Past Medical History:   Diagnosis Date   • Arthritis    • Chest pain    • COPD (chronic obstructive pulmonary disease) (HCC)    • GERD (gastroesophageal reflux disease)    • Hyperlipidemia    • Hypertension    • Hypokalemia    • Hypothyroidism    • Pericardial effusion, trivial      Patient Active Problem List   Diagnosis   •  Pericardial effusion*   • Hypertension*   • Hyperlipidemia*   • COPD (chronic obstructive pulmonary disease)*   • Acquired hypothyroidism   • Bradycardia, sinus       Social History     Tobacco Use   • Smoking status: Former Smoker     Packs/day: 2.00     Types: Cigarettes     Quit date:      Years since quittin.2   • Smokeless tobacco: Never Used   Vaping Use   • Vaping Use: Never used   Substance Use Topics   • Alcohol use: No   • Drug use: No       Allergies   Allergen Reactions   • Codeine        Current Outpatient Medications on File Prior to Visit   Medication Sig   • amitriptyline (ELAVIL) 25 MG tablet Take 25 mg by mouth daily.   • atenolol (TENORMIN) 25 MG tablet Take 25 mg by mouth daily.   • atorvastatin (LIPITOR) 20 MG tablet Take 20 mg by mouth Daily.   • Budesonide-Formoterol Fumarate (SYMBICORT IN) Inhale 2 puffs 2 (two) times a day as needed.   • Calcium Carbonate-Vitamin D (CALCIUM 600+D3 PO) Take  by mouth.   • celecoxib (CeleBREX) 200 MG capsule TAKE ONE CAPSULE BY MOUTH ONCE DAILY FOR ARTHRITIS   • ibuprofen (ADVIL,MOTRIN) 600 MG tablet    • levothyroxine (SYNTHROID, LEVOTHROID) 112 MCG tablet Take 112 mcg by mouth Daily.   • lisinopril (PRINIVIL,ZESTRIL) 10 MG tablet Take 10 mg by mouth Every Night.   • lisinopril-hydrochlorothiazide (PRINZIDE,ZESTORETIC) 10-12.5 MG per tablet Take 1 tablet by mouth Every Morning.   • metFORMIN (GLUCOPHAGE) 500 MG tablet TAKE ONE TABLET BY MOUTH EVERY MORNING FOR 7 DAYS THEN INCREASE TO TWICE A DAY   • nitroglycerin (NITROSTAT) 0.4 MG SL tablet    • omeprazole (PriLOSEC) 20 MG capsule Take 20 mg by mouth 2 (two) times a day.   • potassium chloride (K-DUR) 10 MEQ CR tablet Take 10 mEq by mouth daily.   • tolterodine LA (DETROL LA) 4 MG 24 hr capsule Take 4 mg by mouth daily.   • topiramate (TOPAMAX) 50 MG tablet Take 50 mg by mouth daily.   • vitamin B-12 (CYANOCOBALAMIN) 1000 MCG tablet Take 1,000 mcg by mouth Daily.   • DULoxetine (CYMBALTA) 30 MG  "capsule Take 30 mg by mouth Every Morning.     No current facility-administered medications on file prior to visit.         The following portions of the patient's history were reviewed and updated as appropriate: allergies, current medications, past family history, past medical history, past social history, past surgical history and problem list.    Review of Systems   Constitutional: Negative.   HENT: Negative.  Negative for congestion.    Eyes: Negative.    Cardiovascular: Negative.  Negative for chest pain, cyanosis, dyspnea on exertion, irregular heartbeat, leg swelling, near-syncope, orthopnea, palpitations, paroxysmal nocturnal dyspnea and syncope.   Respiratory: Negative.  Negative for shortness of breath.    Hematologic/Lymphatic: Negative.    Musculoskeletal: Negative.    Gastrointestinal: Negative.    Neurological: Negative.  Negative for headaches.          Objective:     /84 (BP Location: Left arm, Patient Position: Sitting, Cuff Size: Adult)   Pulse 71   Temp 97.5 °F (36.4 °C)   Ht 160 cm (63\")   Wt 95.3 kg (210 lb)   SpO2 97%   BMI 37.20 kg/m²   Pulmonary:      Effort: Pulmonary effort is normal.      Breath sounds: Normal breath sounds. No stridor. No wheezing. No rhonchi. No rales.   Cardiovascular:      PMI at left midclavicular line. Normal rate. Regular rhythm. Normal S1. Normal S2.      Murmurs: There is no murmur.      No gallop. No click. No rub.   Pulses:     Intact distal pulses.   Edema:     Peripheral edema absent.           Lab Review        Procedures       I personally viewed and interpreted the patient's LAB data         Assessment:     1. Mixed hyperlipidemia    2. Primary hypertension    3. Acquired hypothyroidism          Plan:     Patient is 72 years old white female who had history of trivial pericardial effusion few years ago however she is asymptomatic at this time and doing very well.  She did not wish to have a repeat echocardiogram.    She denies any chest pain " palpitations or shortness of breath.  Blood pressure is very well controlled.  She is taking lisinopril, hydrochlorothiazide and low-dose atenolol.    Hyperlipidemia is also controlled with Lipitor.  LDL is mildly elevated at 98 total cholesterol 196.    Thyroid functions are also normal.  Weight loss emphasized  Follow-up in 6 months or sooner if he has any symptoms        No follow-ups on file.

## 2022-06-30 ENCOUNTER — OFFICE VISIT (OUTPATIENT)
Dept: PULMONOLOGY | Facility: CLINIC | Age: 72
End: 2022-06-30

## 2022-06-30 VITALS
TEMPERATURE: 97.8 F | BODY MASS INDEX: 37.39 KG/M2 | HEART RATE: 78 BPM | SYSTOLIC BLOOD PRESSURE: 126 MMHG | OXYGEN SATURATION: 98 % | WEIGHT: 211 LBS | HEIGHT: 63 IN | DIASTOLIC BLOOD PRESSURE: 82 MMHG

## 2022-06-30 DIAGNOSIS — J41.0 SIMPLE CHRONIC BRONCHITIS: Primary | ICD-10-CM

## 2022-06-30 DIAGNOSIS — G47.34 NOCTURNAL HYPOXIA: ICD-10-CM

## 2022-06-30 DIAGNOSIS — E66.9 OBESITY (BMI 30-39.9): ICD-10-CM

## 2022-06-30 PROBLEM — J45.909 AIRWAY HYPERREACTIVITY: Status: ACTIVE | Noted: 2022-06-30

## 2022-06-30 PROBLEM — I10 BP (HIGH BLOOD PRESSURE): Status: ACTIVE | Noted: 2022-06-30

## 2022-06-30 PROBLEM — G43.009 ATYPICAL MIGRAINE: Status: ACTIVE | Noted: 2022-06-30

## 2022-06-30 PROBLEM — Q79.2 EXOMPHALOS: Status: ACTIVE | Noted: 2022-06-30

## 2022-06-30 PROBLEM — J44.9 CAFL (CHRONIC AIRFLOW LIMITATION) (HCC): Status: ACTIVE | Noted: 2022-06-30

## 2022-06-30 PROCEDURE — 99214 OFFICE O/P EST MOD 30 MIN: CPT | Performed by: NURSE PRACTITIONER

## 2022-06-30 RX ORDER — PREDNISONE 20 MG/1
40 TABLET ORAL DAILY
Qty: 10 TABLET | Refills: 0 | Status: SHIPPED | OUTPATIENT
Start: 2022-06-30

## 2022-06-30 RX ORDER — ALBUTEROL SULFATE 0.63 MG/3ML
SOLUTION RESPIRATORY (INHALATION)
COMMUNITY
Start: 2022-05-10

## 2022-06-30 RX ORDER — POTASSIUM CHLORIDE 750 MG/1
10 TABLET, EXTENDED RELEASE ORAL DAILY
COMMUNITY
Start: 2022-06-06 | End: 2022-10-04 | Stop reason: SDUPTHER

## 2022-06-30 RX ORDER — METHOCARBAMOL 500 MG/1
500 TABLET, FILM COATED ORAL 2 TIMES DAILY
COMMUNITY
Start: 2022-06-27

## 2022-06-30 RX ORDER — BUSPIRONE HYDROCHLORIDE 10 MG/1
10 TABLET ORAL 3 TIMES DAILY PRN
COMMUNITY
Start: 2022-05-10 | End: 2022-10-04 | Stop reason: ALTCHOICE

## 2022-06-30 RX ORDER — PROMETHAZINE HYDROCHLORIDE 25 MG/1
TABLET ORAL
COMMUNITY
Start: 2022-05-10 | End: 2022-10-04

## 2022-06-30 RX ORDER — CHOLECALCIFEROL (VITAMIN D3) 1250 MCG
50000 CAPSULE ORAL WEEKLY
COMMUNITY
Start: 2022-06-06

## 2022-10-04 ENCOUNTER — OFFICE VISIT (OUTPATIENT)
Dept: CARDIOLOGY | Facility: CLINIC | Age: 72
End: 2022-10-04

## 2022-10-04 VITALS
BODY MASS INDEX: 37.56 KG/M2 | HEIGHT: 63 IN | SYSTOLIC BLOOD PRESSURE: 110 MMHG | DIASTOLIC BLOOD PRESSURE: 84 MMHG | OXYGEN SATURATION: 97 % | WEIGHT: 212 LBS | HEART RATE: 75 BPM

## 2022-10-04 DIAGNOSIS — I31.39 PERICARDIAL EFFUSION: ICD-10-CM

## 2022-10-04 DIAGNOSIS — R06.09 DOE (DYSPNEA ON EXERTION): ICD-10-CM

## 2022-10-04 DIAGNOSIS — I10 PRIMARY HYPERTENSION: ICD-10-CM

## 2022-10-04 DIAGNOSIS — E78.2 MIXED HYPERLIPIDEMIA: ICD-10-CM

## 2022-10-04 DIAGNOSIS — I20.8 ANGINAL EQUIVALENT: Primary | ICD-10-CM

## 2022-10-04 PROBLEM — I20.89 ANGINAL EQUIVALENT: Status: ACTIVE | Noted: 2022-10-04

## 2022-10-04 PROCEDURE — 99214 OFFICE O/P EST MOD 30 MIN: CPT | Performed by: INTERNAL MEDICINE

## 2022-10-04 RX ORDER — MELOXICAM 7.5 MG/1
7.5 TABLET ORAL DAILY
COMMUNITY
Start: 2022-08-23

## 2022-10-04 RX ORDER — TRAMADOL HYDROCHLORIDE 50 MG/1
50 TABLET ORAL EVERY 8 HOURS PRN
COMMUNITY
Start: 2022-07-19

## 2022-10-04 NOTE — PROGRESS NOTES
subjective     Chief Complaint   Patient presents with   • Hypertension     Follow up   • Hyperlipidemia     Follow up   • Slow Heart Rate     History of Present Illness  Caprice is 72 years old white female who is here for cardiology follow-up.  He has prior history of trivial pericardial effusion and has been asymptomatic.  She also has history of hypertension, hyperlipidemia, hypothyroidism and COPD.  She is taking her medications regularly states her breathing medications are helping and she is following closely with her PCP Roxie Russo.    Hyperlipidemia on Lipitor therapy.  She complains of L lower back pain is taking Celebrex.  From cardiac standpoint she also complains of bradycardia but her heart rate is 75 today and she is on beta-blocker therapy.    Dyspnea on exertion according to her is getting slightly worse and she is concerned about her heart.  She is overweight and has multiple risk factors for coronary artery disease.  She does not have classical chest pain but complains of mild fullness off-and-on with exertion        Past Surgical History:   Procedure Laterality Date   • APPENDECTOMY     • CARDIAC CATHETERIZATION  2008   • CARDIOVASCULAR STRESS TEST  09/2014   • CATARACT EXTRACTION, BILATERAL Bilateral     2015 right eye, Dr Hauser  and  2/2017 left eye Dr Meyer   • ECHO - CONVERTED  09/2014   • GALLBLADDER SURGERY     • HERNIA REPAIR     • TUBAL ABDOMINAL LIGATION       Family History   Problem Relation Age of Onset   • Heart disease Mother    • Diabetes Mother    • Heart attack Mother         cause of death   • Cirrhosis Father         cause of death   • Cancer Sister         liver ca   • Cancer Brother         lung ca   • Cancer Brother         colon ca   • Heart disease Brother    • Heart failure Brother    • Cancer Sister         lung ca   • Heart failure Sister    • Cancer Sister      Past Medical History:   Diagnosis Date   • Arthritis    • Chest pain    • COPD (chronic obstructive  pulmonary disease) ([facility])    • GERD (gastroesophageal reflux disease)    • Hyperlipidemia    • Hypertension    • Hypokalemia    • Hypothyroidism    • Pericardial effusion, trivial      Patient Active Problem List   Diagnosis   • Pericardial effusion*   • Hypertension*   • Hyperlipidemia*   • COPD (chronic obstructive pulmonary disease)*   • Acquired hypothyroidism   • CUELLAR (dyspnea on exertion)   • Bradycardia, sinus   • Atypical migraine   • Airway hyperreactivity   • CAFL (chronic airflow limitation) ([facility])   • Exomphalos   • Anginal equivalent ([facility])       Social History     Tobacco Use   • Smoking status: Former Smoker     Packs/day: 2.00     Types: Cigarettes     Quit date:      Years since quittin.7   • Smokeless tobacco: Never Used   Vaping Use   • Vaping Use: Never used   Substance Use Topics   • Alcohol use: No   • Drug use: No       Allergies   Allergen Reactions   • Codeine        Current Outpatient Medications on File Prior to Visit   Medication Sig   • albuterol (ACCUNEB) 0.63 MG/3ML nebulizer solution USE 1 VIAL IN NEBULIZER EVERY 6 HOURS   • amitriptyline (ELAVIL) 25 MG tablet Take 25 mg by mouth daily.   • atenolol (TENORMIN) 25 MG tablet Take 25 mg by mouth daily.   • atorvastatin (LIPITOR) 20 MG tablet Take 20 mg by mouth Daily.   • Budesonide-Formoterol Fumarate (SYMBICORT IN) Inhale 2 puffs 2 (two) times a day as needed.   • Calcium Carbonate-Vitamin D (CALCIUM 600+D3 PO) Take  by mouth.   • celecoxib (CeleBREX) 200 MG capsule TAKE ONE CAPSULE BY MOUTH ONCE DAILY FOR ARTHRITIS   • Cholecalciferol (Vitamin D3) 1.25 MG (38534 UT) capsule Take 50,000 Units by mouth 1 (One) Time Per Week.   • ibuprofen (ADVIL,MOTRIN) 600 MG tablet    • levothyroxine (SYNTHROID, LEVOTHROID) 112 MCG tablet Take 112 mcg by mouth Daily.   • lisinopril (PRINIVIL,ZESTRIL) 10 MG tablet Take 10 mg by mouth Every Night.   • lisinopril-hydrochlorothiazide (PRINZIDE,ZESTORETIC) 10-12.5 MG per tablet Take 1 tablet by mouth  Every Morning.   • meloxicam (MOBIC) 7.5 MG tablet Take 7.5 mg by mouth Daily.   • methocarbamol (ROBAXIN) 500 MG tablet Take 500 mg by mouth 2 (Two) Times a Day.   • nitroglycerin (NITROSTAT) 0.4 MG SL tablet    • omeprazole (PriLOSEC) 20 MG capsule Take 20 mg by mouth 2 (two) times a day.   • potassium chloride (K-DUR) 10 MEQ CR tablet Take 10 mEq by mouth daily.   • tolterodine LA (DETROL LA) 4 MG 24 hr capsule Take 4 mg by mouth daily.   • topiramate (TOPAMAX) 50 MG tablet Take 50 mg by mouth daily.   • traMADol (ULTRAM) 50 MG tablet Take 50 mg by mouth Every 8 (Eight) Hours As Needed.   • vitamin B-12 (CYANOCOBALAMIN) 1000 MCG tablet Take 1,000 mcg by mouth Daily.   • [DISCONTINUED] promethazine (PHENERGAN) 25 MG tablet TAKE 1 TABLET BY MOUTH EVERY 4 TO 6 HOURS AS NEEDED FOR NAUSEA AND VOMITING   • predniSONE (DELTASONE) 20 MG tablet Take 2 tablets by mouth Daily.   • [DISCONTINUED] busPIRone (BUSPAR) 10 MG tablet Take 10 mg by mouth 3 (Three) Times a Day As Needed.   • [DISCONTINUED] DULoxetine (CYMBALTA) 30 MG capsule Take 30 mg by mouth Every Morning.   • [DISCONTINUED] metFORMIN (GLUCOPHAGE) 500 MG tablet TAKE ONE TABLET BY MOUTH EVERY MORNING FOR 7 DAYS THEN INCREASE TO TWICE A DAY   • [DISCONTINUED] potassium chloride (K-DUR,KLOR-CON) 10 MEQ CR tablet Take 10 mEq by mouth Daily.     No current facility-administered medications on file prior to visit.         The following portions of the patient's history were reviewed and updated as appropriate: allergies, current medications, past family history, past medical history, past social history, past surgical history and problem list.    Review of Systems   Constitutional: Negative.   HENT: Negative.  Negative for congestion.    Eyes: Negative.    Cardiovascular: Positive for dyspnea on exertion. Negative for chest pain, cyanosis, irregular heartbeat, leg swelling, near-syncope, orthopnea, palpitations, paroxysmal nocturnal dyspnea and syncope.   Respiratory:  "Positive for shortness of breath.    Hematologic/Lymphatic: Negative.    Musculoskeletal: Negative.    Gastrointestinal: Negative.    Neurological: Negative.  Negative for headaches.          Objective:     /84 (BP Location: Left arm, Patient Position: Sitting, Cuff Size: Adult)   Pulse 75   Ht 160 cm (63\")   Wt 96.2 kg (212 lb)   SpO2 97%   BMI 37.55 kg/m²   Cardiovascular:      PMI at left midclavicular line. Normal rate. Regular rhythm. Normal S1. Normal S2.      Murmurs: There is no murmur.      No gallop. No click. No rub.   Pulses:     Intact distal pulses.   Edema:     Peripheral edema absent.           Lab Review  Copy of lab work obtained and reviewed.        Procedures       I personally viewed and interpreted the patient's LAB data         Assessment:     1. Anginal equivalent (HCC)    2. Pericardial effusion*    3. Primary hypertension    4. Mixed hyperlipidemia    5. CUELLAR (dyspnea on exertion)          Plan:     Patient is 72 years old white female with multiple risk factors for coronary artery disease including obesity, hypertension, hyperlipidemia and diabetes mellitus.  She presents with dyspnea on exertion and mild chest discomfort.  She has prior history of pericardial effusion also.  Blood pressure is very well controlled.  Lab work reviewed lipid control is also good.    She was advised to continue current medications  Will get echo and Lexiscan stress test for further evaluation.      Thank you for giving me the oppertunity to participate in your patient's cardiac care.    Sincerely,    REGINE Agosto M.D. FACP [facility]          No follow-ups on file.  "

## 2022-10-06 ENCOUNTER — APPOINTMENT (OUTPATIENT)
Dept: CARDIOLOGY | Facility: HOSPITAL | Age: 72
End: 2022-10-06

## 2022-10-06 ENCOUNTER — APPOINTMENT (OUTPATIENT)
Dept: NUCLEAR MEDICINE | Facility: HOSPITAL | Age: 72
End: 2022-10-06

## 2022-12-08 ENCOUNTER — APPOINTMENT (OUTPATIENT)
Dept: NUCLEAR MEDICINE | Facility: HOSPITAL | Age: 72
End: 2022-12-08

## 2022-12-08 ENCOUNTER — APPOINTMENT (OUTPATIENT)
Dept: CARDIOLOGY | Facility: HOSPITAL | Age: 72
End: 2022-12-08

## 2023-01-20 ENCOUNTER — OFFICE VISIT (OUTPATIENT)
Dept: PULMONOLOGY | Facility: CLINIC | Age: 73
End: 2023-01-20
Payer: MEDICARE

## 2023-01-20 ENCOUNTER — HOSPITAL ENCOUNTER (OUTPATIENT)
Dept: NUCLEAR MEDICINE | Facility: HOSPITAL | Age: 73
Discharge: HOME | End: 2023-01-20
Payer: MEDICARE

## 2023-01-20 ENCOUNTER — HOSPITAL ENCOUNTER (OUTPATIENT)
Dept: CARDIOLOGY | Facility: HOSPITAL | Age: 73
Discharge: HOME | End: 2023-01-20
Payer: MEDICARE

## 2023-01-20 VITALS
HEART RATE: 69 BPM | DIASTOLIC BLOOD PRESSURE: 96 MMHG | SYSTOLIC BLOOD PRESSURE: 168 MMHG | HEIGHT: 63 IN | OXYGEN SATURATION: 95 % | WEIGHT: 211 LBS | BODY MASS INDEX: 37.39 KG/M2 | TEMPERATURE: 97.7 F

## 2023-01-20 DIAGNOSIS — R06.09 DOE (DYSPNEA ON EXERTION): ICD-10-CM

## 2023-01-20 DIAGNOSIS — G47.34 NOCTURNAL HYPOXIA: ICD-10-CM

## 2023-01-20 DIAGNOSIS — I20.8 ANGINAL EQUIVALENT: ICD-10-CM

## 2023-01-20 DIAGNOSIS — I31.39 PERICARDIAL EFFUSION: ICD-10-CM

## 2023-01-20 DIAGNOSIS — E66.01 CLASS 2 SEVERE OBESITY DUE TO EXCESS CALORIES WITH SERIOUS COMORBIDITY AND BODY MASS INDEX (BMI) OF 37.0 TO 37.9 IN ADULT: ICD-10-CM

## 2023-01-20 DIAGNOSIS — J41.0 SIMPLE CHRONIC BRONCHITIS: Primary | ICD-10-CM

## 2023-01-20 LAB
BH CV ECHO MEAS - ACS: 1.4 CM
BH CV ECHO MEAS - AO MAX PG: 10.8 MMHG
BH CV ECHO MEAS - AO MEAN PG: 6 MMHG
BH CV ECHO MEAS - AO ROOT DIAM: 2.8 CM
BH CV ECHO MEAS - AO V2 MAX: 164 CM/SEC
BH CV ECHO MEAS - AO V2 VTI: 38.2 CM
BH CV ECHO MEAS - AVA(I,D): 2.09 CM2
BH CV ECHO MEAS - EDV(CUBED): 127.3 ML
BH CV ECHO MEAS - EDV(MOD-SP4): 76.8 ML
BH CV ECHO MEAS - EF(MOD-SP4): 58.6 %
BH CV ECHO MEAS - ESV(CUBED): 42.3 ML
BH CV ECHO MEAS - ESV(MOD-SP4): 31.8 ML
BH CV ECHO MEAS - FS: 30.7 %
BH CV ECHO MEAS - IVS/LVPW: 1.02 CM
BH CV ECHO MEAS - IVSD: 1.14 CM
BH CV ECHO MEAS - LA DIMENSION: 3.5 CM
BH CV ECHO MEAS - LAT PEAK E' VEL: 6.4 CM/SEC
BH CV ECHO MEAS - LV DIASTOLIC VOL/BSA (35-75): 38.8 CM2
BH CV ECHO MEAS - LV MASS(C)D: 218.4 GRAMS
BH CV ECHO MEAS - LV MAX PG: 5.8 MMHG
BH CV ECHO MEAS - LV MEAN PG: 3 MMHG
BH CV ECHO MEAS - LV SYSTOLIC VOL/BSA (12-30): 16.1 CM2
BH CV ECHO MEAS - LV V1 MAX: 120 CM/SEC
BH CV ECHO MEAS - LV V1 VTI: 31.4 CM
BH CV ECHO MEAS - LVIDD: 5 CM
BH CV ECHO MEAS - LVIDS: 3.5 CM
BH CV ECHO MEAS - LVOT AREA: 2.5 CM2
BH CV ECHO MEAS - LVOT DIAM: 1.8 CM
BH CV ECHO MEAS - LVPWD: 1.13 CM
BH CV ECHO MEAS - MED PEAK E' VEL: 4.5 CM/SEC
BH CV ECHO MEAS - MV A MAX VEL: 114 CM/SEC
BH CV ECHO MEAS - MV E MAX VEL: 80.6 CM/SEC
BH CV ECHO MEAS - MV E/A: 0.71
BH CV ECHO MEAS - PA ACC TIME: 0.11 SEC
BH CV ECHO MEAS - PA PR(ACCEL): 30 MMHG
BH CV ECHO MEAS - SI(MOD-SP4): 22.7 ML/M2
BH CV ECHO MEAS - SV(LVOT): 79.9 ML
BH CV ECHO MEAS - SV(MOD-SP4): 45 ML
BH CV ECHO MEAS - TAPSE (>1.6): 2.03 CM
BH CV ECHO MEASUREMENTS AVERAGE E/E' RATIO: 14.79
BH CV NUCLEAR PRIOR STUDY: 3
BH CV REST NUCLEAR ISOTOPE DOSE: 10.2 MCI
BH CV STRESS BP STAGE 1: NORMAL
BH CV STRESS COMMENTS STAGE 1: NORMAL
BH CV STRESS DOSE REGADENOSON STAGE 1: 0.4
BH CV STRESS DURATION MIN STAGE 1: 0
BH CV STRESS DURATION SEC STAGE 1: 10
BH CV STRESS HR STAGE 1: 95
BH CV STRESS NUCLEAR ISOTOPE DOSE: 30 MCI
BH CV STRESS PROTOCOL 1: NORMAL
BH CV STRESS RECOVERY BP: NORMAL MMHG
BH CV STRESS RECOVERY HR: 85 BPM
BH CV STRESS STAGE 1: 1
LV EF NUC BP: 71 %
MAXIMAL PREDICTED HEART RATE: 146 BPM
MAXIMAL PREDICTED HEART RATE: 146 BPM
PERCENT MAX PREDICTED HR: 65.07 %
STRESS BASELINE BP: NORMAL MMHG
STRESS BASELINE HR: 77 BPM
STRESS PERCENT HR: 77 %
STRESS POST PEAK BP: NORMAL MMHG
STRESS POST PEAK HR: 95 BPM
STRESS TARGET HR: 124 BPM
STRESS TARGET HR: 124 BPM

## 2023-01-20 PROCEDURE — 78452 HT MUSCLE IMAGE SPECT MULT: CPT

## 2023-01-20 PROCEDURE — A9500 TC99M SESTAMIBI: HCPCS | Performed by: INTERNAL MEDICINE

## 2023-01-20 PROCEDURE — 93018 CV STRESS TEST I&R ONLY: CPT | Performed by: INTERNAL MEDICINE

## 2023-01-20 PROCEDURE — 93306 TTE W/DOPPLER COMPLETE: CPT

## 2023-01-20 PROCEDURE — 99214 OFFICE O/P EST MOD 30 MIN: CPT | Performed by: NURSE PRACTITIONER

## 2023-01-20 PROCEDURE — 0 TECHNETIUM SESTAMIBI: Performed by: INTERNAL MEDICINE

## 2023-01-20 PROCEDURE — 78452 HT MUSCLE IMAGE SPECT MULT: CPT | Performed by: INTERNAL MEDICINE

## 2023-01-20 PROCEDURE — 93306 TTE W/DOPPLER COMPLETE: CPT | Performed by: INTERNAL MEDICINE

## 2023-01-20 PROCEDURE — 25010000002 REGADENOSON 0.4 MG/5ML SOLUTION: Performed by: INTERNAL MEDICINE

## 2023-01-20 PROCEDURE — 93017 CV STRESS TEST TRACING ONLY: CPT

## 2023-01-20 RX ORDER — POTASSIUM CHLORIDE 750 MG/1
10 TABLET, EXTENDED RELEASE ORAL DAILY
COMMUNITY
Start: 2022-12-20 | End: 2023-01-20

## 2023-01-20 RX ADMIN — TECHNETIUM TC 99M SESTAMIBI 1 DOSE: 1 INJECTION INTRAVENOUS at 10:42

## 2023-01-20 RX ADMIN — REGADENOSON 0.4 MG: 0.08 INJECTION, SOLUTION INTRAVENOUS at 11:55

## 2023-01-20 RX ADMIN — TECHNETIUM TC 99M SESTAMIBI 1 DOSE: 1 INJECTION INTRAVENOUS at 11:55

## 2023-01-20 NOTE — PROGRESS NOTES
"Chief Complaint  Simple chronic bronchitis (HCC) and Shortness of Breath    Subjective        Caprice Rodriguez presents to [facility] PULMONARY [facility]ARE MEDICINE  History of Present Illness     Ms. Rodriguez is a 72 year old female with a medical history significant for arthritis, COPD, GERD, hypertension, hyperlipidemia, and hypothyroidism.    She presents today for follow up on COPD.  She states that she feels that her breathing is getting worse.  She tells me that she is unable to walk hardly any distance without becoming extremely short of breath. She is currently using Symbicort BID and albuterol as needed.  She does use supplemental oxygen at night but not during the day.            Objective   Vital Signs:  /96 (BP Location: Left arm, Patient Position: Sitting)   Pulse 69   Temp 97.7 °F (36.5 °C)   Ht 160 cm (63\")   Wt 95.7 kg (211 lb)   SpO2 95%   BMI 37.38 kg/m²   Estimated body mass index is 37.38 kg/m² as calculated from the following:    Height as of this encounter: 160 cm (63\").    Weight as of this encounter: 95.7 kg (211 lb).       Class 2 Severe Obesity (BMI >=35 and <=39.9). Obesity-related health conditions include the following: hypertension, dyslipidemias and GERD. Obesity is unchanged. BMI is is above average; BMI management plan is completed. We discussed portion control and increasing exercise.      Physical Exam     GENERAL APPEARANCE: Well developed, well nourished, alert and cooperative, and appears to be in no acute distress.    HEAD: normocephalic. Atraumatic.    EYES: PERRL, EOMI. Vision is grossly intact.    THROAT: Oral cavity and pharynx normal. No inflammation, swelling, exudate, or lesions.     NECK: Neck supple.  No thyromegaly.    CARDIAC: Normal S1 and S2. No S3, S4 or murmurs. Rhythm is regular.     RESPIRATORY:Bilateral air entry positive. Bilateral diminished breath sounds. No wheezing, crackles or rhonchi noted.    GI: Positive bowel sounds. " Soft, nondistended, nontender.     MUSCULOSKELETAL: No significant deformity or joint abnormality. No edema. Peripheral pulses intact. No varicosities.    NEUROLOGICAL: Strength and sensation symmetric and intact throughout.     PSYCHIATRIC: The mental examination revealed the patient was oriented to person, place, and time.     Result Review :  The following data was reviewed by: STEPHANY Wong on 01/20/2023:              Assessment and Plan   Diagnoses and all orders for this visit:    1. Simple chronic bronchitis (HCC) (Primary)  -     Oxygen Therapy    2. Nocturnal hypoxia    3. Class 2 severe obesity due to excess calories with serious comorbidity and body mass index (BMI) of 37.0 to 37.9 in adult (HCC)          Continue Symbicort BID.  Continue albuterol as needed.    Complaint with supplemental oxygen at night.    6 MWT was done in office.  Her oxygen saturation dropped to 88% after walking for 2 minutes.  She was placed on 2 L and her oxygen increased to 95%.    Will place order for portable oxygen equipment.          Follow Up   Return in about 3 months (around 4/20/2023).  Patient was given instructions and counseling regarding her condition or for health maintenance advice. Please see specific information pulled into the AVS if appropriate.

## 2023-01-23 ENCOUNTER — TELEPHONE (OUTPATIENT)
Dept: CARDIOLOGY | Facility: CLINIC | Age: 73
End: 2023-01-23
Payer: MEDICARE

## 2023-01-23 NOTE — TELEPHONE ENCOUNTER
----- Message from Dalia Agosto MD sent at 1/22/2023  2:44 PM EST -----  Stress test and echocardiogram are both okay.

## 2023-03-30 ENCOUNTER — TELEPHONE (OUTPATIENT)
Dept: CARDIOLOGY | Facility: CLINIC | Age: 73
End: 2023-03-30
Payer: MEDICARE

## 2023-03-30 ENCOUNTER — TRANSCRIBE ORDERS (OUTPATIENT)
Dept: ADMINISTRATIVE | Facility: HOSPITAL | Age: 73
End: 2023-03-30
Payer: MEDICARE

## 2023-03-30 DIAGNOSIS — R91.8 LUNG FIELD ABNORMAL: Primary | ICD-10-CM

## 2023-03-30 NOTE — TELEPHONE ENCOUNTER
----- Message from Dalia Agosto MD sent at 3/30/2023 12:38 PM EDT -----  We have received the CAT scan report from your PCP STEPHANY Smith.  I know you are getting a PET scan today, hopefully report will be favorable.  CT scan also suggestive of subclavian stenosis which is an artery outside the heart   You should have CT angiogram done.  We will talk about it at your office visit.

## 2023-03-30 NOTE — TELEPHONE ENCOUNTER
Called pt and informed her of the following message per Dalia Espinoza MD, MD P [facility]  We have received the CAT scan report from your PCP STEPHANY Smith.   I know you are getting a PET scan today, hopefully report will be favorable.   CT scan also suggestive of subclavian stenosis which is an artery outside the heart   You should have CT angiogram done.  We will talk about it at your office visit.

## 2023-04-04 ENCOUNTER — OFFICE VISIT (OUTPATIENT)
Dept: CARDIOLOGY | Facility: CLINIC | Age: 73
End: 2023-04-04
Payer: MEDICARE

## 2023-04-04 VITALS
HEIGHT: 63 IN | OXYGEN SATURATION: 99 % | HEART RATE: 67 BPM | DIASTOLIC BLOOD PRESSURE: 90 MMHG | WEIGHT: 210.2 LBS | SYSTOLIC BLOOD PRESSURE: 150 MMHG | BODY MASS INDEX: 37.25 KG/M2

## 2023-04-04 DIAGNOSIS — R91.8 MASS OF RIGHT LUNG: ICD-10-CM

## 2023-04-04 DIAGNOSIS — E78.2 MIXED HYPERLIPIDEMIA: ICD-10-CM

## 2023-04-04 DIAGNOSIS — I77.1 SUBCLAVIAN ARTERIAL STENOSIS: Primary | ICD-10-CM

## 2023-04-04 DIAGNOSIS — I10 PRIMARY HYPERTENSION: ICD-10-CM

## 2023-04-04 PROCEDURE — 3080F DIAST BP >= 90 MM HG: CPT | Performed by: INTERNAL MEDICINE

## 2023-04-04 PROCEDURE — 3077F SYST BP >= 140 MM HG: CPT | Performed by: INTERNAL MEDICINE

## 2023-04-04 PROCEDURE — 99214 OFFICE O/P EST MOD 30 MIN: CPT | Performed by: INTERNAL MEDICINE

## 2023-04-04 NOTE — LETTER
April 5, 2023     STEPHANY Chang  [address]  [facility] [address]    Patient: Caprice Rodriguez   YOB: 1950   Date of Visit: 4/4/2023       Dear STEPHANY Chang    Caprice Rodriguez was in my office today. Below is a copy of my note.    If you have questions, please do not hesitate to call me. I look forward to following Caprice along with you.         Sincerely,        Dalia Agosto MD        CC: Heather Mancuso DO    subjective     Chief Complaint   Patient presents with   • Hypertension     Pt here for 6 month follow up      History of Present Illness    Cparice is 73 years old white female who recently was diagnosed to have lung mass and is quite concerned.  CT scan at also showed possibility of left subclavian stenosis.    Blood pressure is elevated however she has been taking Deltasone which could be contributing to elevated blood pressure    Currently she is taking lisinopril 10 mg daily and lisinopril HCT 10/12.5 daily making total of lisinopril 20 and hydrochlorothiazide 12.5.  She also takes atenolol 25 mg daily.    Hyperlipidemia on Lipitor 20 mg daily  Hypothyroidism on Synthroid 112 mcg daily.    She denies any chest pain or palpitations      Past Surgical History:   Procedure Laterality Date   • APPENDECTOMY     • CARDIAC CATHETERIZATION  2008   • CARDIOVASCULAR STRESS TEST  09/2014   • CATARACT EXTRACTION, BILATERAL Bilateral     2015 right eye, Dr Hauser  and  2/2017 left eye Dr Meyer   • ECHO - CONVERTED  09/2014   • GALLBLADDER SURGERY     • HERNIA REPAIR     • TUBAL ABDOMINAL LIGATION       Family History   Problem Relation Age of Onset   • Heart disease Mother    • Diabetes Mother    • Heart attack Mother         cause of death   • Cirrhosis Father         cause of death   • Cancer Sister         liver ca   • Cancer Brother         lung ca   • Cancer Brother         colon ca   • Heart disease Brother    • Heart failure Brother    • Cancer Sister          lung ca   • Heart failure Sister    • Cancer Sister      Past Medical History:   Diagnosis Date   • Arthritis    • Chest pain    • COPD (chronic obstructive pulmonary disease)    • GERD (gastroesophageal reflux disease)    • Hyperlipidemia    • Hypertension    • Hypokalemia    • Hypothyroidism    • Pericardial effusion, trivial      Patient Active Problem List   Diagnosis   • Pericardial effusion*   • Hypertension*   • Hyperlipidemia*   • COPD (chronic obstructive pulmonary disease)*   • Acquired hypothyroidism   • CUELLAR (dyspnea on exertion)   • Bradycardia, sinus   • Atypical migraine   • Airway hyperreactivity   • CAFL (chronic airflow limitation)   • Exomphalos   • Anginal equivalent   • Subclavian arterial stenosis, left   • Mass of right lung       Social History     Tobacco Use   • Smoking status: Former     Packs/day: 2.00     Years: 20.00     Pack years: 40.00     Types: Cigarettes     Quit date:      Years since quittin.2   • Smokeless tobacco: Never   Vaping Use   • Vaping Use: Never used   Substance Use Topics   • Alcohol use: Not Currently     Alcohol/week: 1.0 standard drink     Types: 1 Cans of beer per week     Comment: occasional fire ball for sickness   • Drug use: No       Allergies   Allergen Reactions   • Codeine        Current Outpatient Medications on File Prior to Visit   Medication Sig   • albuterol (ACCUNEB) 0.63 MG/3ML nebulizer solution USE 1 VIAL IN NEBULIZER EVERY 6 HOURS   • amitriptyline (ELAVIL) 25 MG tablet Take 1 tablet by mouth Daily.   • atenolol (TENORMIN) 25 MG tablet Take 1 tablet by mouth Daily.   • atorvastatin (LIPITOR) 20 MG tablet Take 1 tablet by mouth Daily.   • Budesonide-Formoterol Fumarate (SYMBICORT IN) Inhale 2 puffs 2 (two) times a day as needed.   • Calcium Carbonate-Vitamin D (CALCIUM 600+D3 PO) Take  by mouth.   • celecoxib (CeleBREX) 200 MG capsule TAKE ONE CAPSULE BY MOUTH ONCE DAILY FOR ARTHRITIS   • Cholecalciferol (Vitamin D3) 1.25 MG (81388 UT)  capsule Take 1 capsule by mouth 1 (One) Time Per Week.   • ibuprofen (ADVIL,MOTRIN) 600 MG tablet    • levothyroxine (SYNTHROID, LEVOTHROID) 112 MCG tablet Take 1 tablet by mouth Daily.   • lisinopril (PRINIVIL,ZESTRIL) 10 MG tablet Take 1 tablet by mouth Every Night.   • lisinopril-hydrochlorothiazide (PRINZIDE,ZESTORETIC) 10-12.5 MG per tablet Take 1 tablet by mouth Every Morning.   • meloxicam (MOBIC) 7.5 MG tablet Take 1 tablet by mouth Daily.   • methocarbamol (ROBAXIN) 500 MG tablet Take 1 tablet by mouth 2 (Two) Times a Day.   • nitroglycerin (NITROSTAT) 0.4 MG SL tablet    • omeprazole (PriLOSEC) 20 MG capsule Take 1 capsule by mouth 2 (Two) Times a Day.   • potassium chloride (K-DUR) 10 MEQ CR tablet Take 1 tablet by mouth Daily.   • predniSONE (DELTASONE) 20 MG tablet Take 2 tablets by mouth Daily.   • tolterodine LA (DETROL LA) 4 MG 24 hr capsule Take 1 capsule by mouth Daily.   • topiramate (TOPAMAX) 50 MG tablet Take 1 tablet by mouth Daily.   • traMADol (ULTRAM) 50 MG tablet Take 1 tablet by mouth Every 8 (Eight) Hours As Needed.   • vitamin B-12 (CYANOCOBALAMIN) 1000 MCG tablet Take 1 tablet by mouth Daily.     No current facility-administered medications on file prior to visit.         The following portions of the patient's history were reviewed and updated as appropriate: allergies, current medications, past family history, past medical history, past social history, past surgical history and problem list.    Review of Systems   Constitutional: Negative.   HENT: Negative.  Negative for congestion.    Eyes: Negative.    Cardiovascular: Negative.  Negative for chest pain, cyanosis, dyspnea on exertion, irregular heartbeat, leg swelling, near-syncope, orthopnea, palpitations, paroxysmal nocturnal dyspnea and syncope.        Elevated blood pressure  Wants to discuss abnormal CT scan report showing subclavian stenosis.   Respiratory: Positive for shortness of breath.         Recent discovery of lung mass  "  Hematologic/Lymphatic: Negative.    Musculoskeletal: Negative.    Gastrointestinal: Negative.    Neurological: Negative.  Negative for headaches.         Objective:     /90 (BP Location: Left arm, Patient Position: Sitting, Cuff Size: Adult)   Pulse 67   Ht 160 cm (63\")   Wt 95.3 kg (210 lb 3.2 oz)   SpO2 99%   BMI 37.24 kg/m²   Cardiovascular:      PMI at left midclavicular line. Normal rate. Regular rhythm. Normal S1. Normal S2.      Murmurs: There is no murmur.      No gallop. No click. No rub.   Pulses:     Intact distal pulses.   Edema:     Peripheral edema absent.           Lab Review    Procedures  Interpretation Summary, echocardiogram January 20, 2023       •  Normal left ventricular cavity size with mild concentric left ventricular hypertrophy noted  •  Left ventricular systolic function is normal. Left ventricular ejection fraction appears to be 56 - 60%.  •  Left ventricular diastolic function is consistent with (grade I) impaired relaxation.  •  No significant valvular heart disease noted.  •  No pericardial effusion detected.  Interpretation Summary, stress test January 2023       •  A pharmacological stress test was performed using regadenoson without low-level exercise.  •  Resting EKG showed sinus rhythm at a rate of 77 bpm.  EKG was normal.  •  Resting hypertension noted.  Blood pressure was 201/101.  •  Patient tolerated lexiscan well without complications, no aminophylline was administered  •  ST segments did not show any diagnostic changes.  No significant arrhythmia detected.  •  Left ventricular ejection fraction is hyperdynamic (Calculated EF > 70%).  •  Very mild basal lateral reversibility was seen which does not appear to be significant.  •  Myocardial perfusion imaging indicates a normal myocardial perfusion study with no evidence of ischemia.  •  Impressions are consistent with a low risk study.  •  Compared to the prior study from 9/29/2014 the current study reveals no " changes.                Assessment:     1. Subclavian arterial stenosis, left    2. Primary hypertension    3. Mixed hyperlipidemia    4. Mass of right lung          Plan:     Patient is 73 years old white female who recently had CT scan of the lung showing mass of the right lung possibly lung cancer and was also found to have subclavian artery stenosis by CT scan.  On examination however blood pressures are equal in both arms.  We will arrange CTA for further evaluation.    Blood pressure is elevated patient was advised to increase lisinopril 15 mg nightly.  She will continue lisinopril HCT 10/12 point 5 in the morning    Hyperlipidemia  On Lipitor therapy.    Stress test and echo results were reviewed and discussed with the patient  Stress test is negative for significant exercise-induced myocardial ischemia with normal LV ejection fraction.    Echocardiogram shows normal ejection fraction, grade 1 LV diastolic dysfunction.  No significant valvular heart disease noted.    She is advised to keep a close eye on her blood pressure and call for further instructions.      No follow-ups on file.

## 2023-04-04 NOTE — PROGRESS NOTES
subjective     Chief Complaint   Patient presents with   • Hypertension     Pt here for 6 month follow up      History of Present Illness    Caprice is 73 years old white female who recently was diagnosed to have lung mass and is quite concerned.  CT scan at also showed possibility of left subclavian stenosis.    Blood pressure is elevated however she has been taking Deltasone which could be contributing to elevated blood pressure    Currently she is taking lisinopril 10 mg daily and lisinopril HCT 10/12.5 daily making total of lisinopril 20 and hydrochlorothiazide 12.5.  She also takes atenolol 25 mg daily.    Hyperlipidemia on Lipitor 20 mg daily  Hypothyroidism on Synthroid 112 mcg daily.    She denies any chest pain or palpitations      Past Surgical History:   Procedure Laterality Date   • APPENDECTOMY     • CARDIAC CATHETERIZATION  2008   • CARDIOVASCULAR STRESS TEST  09/2014   • CATARACT EXTRACTION, BILATERAL Bilateral     2015 right eye, Dr Hauser  and  2/2017 left eye Dr Meyer   • ECHO - CONVERTED  09/2014   • GALLBLADDER SURGERY     • HERNIA REPAIR     • TUBAL ABDOMINAL LIGATION       Family History   Problem Relation Age of Onset   • Heart disease Mother    • Diabetes Mother    • Heart attack Mother         cause of death   • Cirrhosis Father         cause of death   • Cancer Sister         liver ca   • Cancer Brother         lung ca   • Cancer Brother         colon ca   • Heart disease Brother    • Heart failure Brother    • Cancer Sister         lung ca   • Heart failure Sister    • Cancer Sister      Past Medical History:   Diagnosis Date   • Arthritis    • Chest pain    • COPD (chronic obstructive pulmonary disease)    • GERD (gastroesophageal reflux disease)    • Hyperlipidemia    • Hypertension    • Hypokalemia    • Hypothyroidism    • Pericardial effusion, trivial      Patient Active Problem List   Diagnosis   • Pericardial effusion*   • Hypertension*   • Hyperlipidemia*   • COPD (chronic obstructive  pulmonary disease)*   • Acquired hypothyroidism   • CUELLAR (dyspnea on exertion)   • Bradycardia, sinus   • Atypical migraine   • Airway hyperreactivity   • CAFL (chronic airflow limitation)   • Exomphalos   • Anginal equivalent   • Subclavian arterial stenosis, left   • Mass of right lung       Social History     Tobacco Use   • Smoking status: Former     Packs/day: 2.00     Years: 20.00     Pack years: 40.00     Types: Cigarettes     Quit date:      Years since quittin.2   • Smokeless tobacco: Never   Vaping Use   • Vaping Use: Never used   Substance Use Topics   • Alcohol use: Not Currently     Alcohol/week: 1.0 standard drink     Types: 1 Cans of beer per week     Comment: occasional fire ball for sickness   • Drug use: No       Allergies   Allergen Reactions   • Codeine        Current Outpatient Medications on File Prior to Visit   Medication Sig   • albuterol (ACCUNEB) 0.63 MG/3ML nebulizer solution USE 1 VIAL IN NEBULIZER EVERY 6 HOURS   • amitriptyline (ELAVIL) 25 MG tablet Take 1 tablet by mouth Daily.   • atenolol (TENORMIN) 25 MG tablet Take 1 tablet by mouth Daily.   • atorvastatin (LIPITOR) 20 MG tablet Take 1 tablet by mouth Daily.   • Budesonide-Formoterol Fumarate (SYMBICORT IN) Inhale 2 puffs 2 (two) times a day as needed.   • Calcium Carbonate-Vitamin D (CALCIUM 600+D3 PO) Take  by mouth.   • celecoxib (CeleBREX) 200 MG capsule TAKE ONE CAPSULE BY MOUTH ONCE DAILY FOR ARTHRITIS   • Cholecalciferol (Vitamin D3) 1.25 MG (31732 UT) capsule Take 1 capsule by mouth 1 (One) Time Per Week.   • ibuprofen (ADVIL,MOTRIN) 600 MG tablet    • levothyroxine (SYNTHROID, LEVOTHROID) 112 MCG tablet Take 1 tablet by mouth Daily.   • lisinopril (PRINIVIL,ZESTRIL) 10 MG tablet Take 1 tablet by mouth Every Night.   • lisinopril-hydrochlorothiazide (PRINZIDE,ZESTORETIC) 10-12.5 MG per tablet Take 1 tablet by mouth Every Morning.   • meloxicam (MOBIC) 7.5 MG tablet Take 1 tablet by mouth Daily.   • methocarbamol  "(ROBAXIN) 500 MG tablet Take 1 tablet by mouth 2 (Two) Times a Day.   • nitroglycerin (NITROSTAT) 0.4 MG SL tablet    • omeprazole (PriLOSEC) 20 MG capsule Take 1 capsule by mouth 2 (Two) Times a Day.   • potassium chloride (K-DUR) 10 MEQ CR tablet Take 1 tablet by mouth Daily.   • predniSONE (DELTASONE) 20 MG tablet Take 2 tablets by mouth Daily.   • tolterodine LA (DETROL LA) 4 MG 24 hr capsule Take 1 capsule by mouth Daily.   • topiramate (TOPAMAX) 50 MG tablet Take 1 tablet by mouth Daily.   • traMADol (ULTRAM) 50 MG tablet Take 1 tablet by mouth Every 8 (Eight) Hours As Needed.   • vitamin B-12 (CYANOCOBALAMIN) 1000 MCG tablet Take 1 tablet by mouth Daily.     No current facility-administered medications on file prior to visit.         The following portions of the patient's history were reviewed and updated as appropriate: allergies, current medications, past family history, past medical history, past social history, past surgical history and problem list.    Review of Systems   Constitutional: Negative.   HENT: Negative.  Negative for congestion.    Eyes: Negative.    Cardiovascular: Negative.  Negative for chest pain, cyanosis, dyspnea on exertion, irregular heartbeat, leg swelling, near-syncope, orthopnea, palpitations, paroxysmal nocturnal dyspnea and syncope.        Elevated blood pressure  Wants to discuss abnormal CT scan report showing subclavian stenosis.   Respiratory: Positive for shortness of breath.         Recent discovery of lung mass   Hematologic/Lymphatic: Negative.    Musculoskeletal: Negative.    Gastrointestinal: Negative.    Neurological: Negative.  Negative for headaches.          Objective:     /90 (BP Location: Left arm, Patient Position: Sitting, Cuff Size: Adult)   Pulse 67   Ht 160 cm (63\")   Wt 95.3 kg (210 lb 3.2 oz)   SpO2 99%   BMI 37.24 kg/m²   Cardiovascular:      PMI at left midclavicular line. Normal rate. Regular rhythm. Normal S1. Normal S2.      Murmurs: There " is no murmur.      No gallop. No click. No rub.   Pulses:     Intact distal pulses.   Edema:     Peripheral edema absent.           Lab Review    Procedures  Interpretation Summary, echocardiogram January 20, 2023       •  Normal left ventricular cavity size with mild concentric left ventricular hypertrophy noted  •  Left ventricular systolic function is normal. Left ventricular ejection fraction appears to be 56 - 60%.  •  Left ventricular diastolic function is consistent with (grade I) impaired relaxation.  •  No significant valvular heart disease noted.  •  No pericardial effusion detected.  Interpretation Summary, stress test January 2023       •  A pharmacological stress test was performed using regadenoson without low-level exercise.  •  Resting EKG showed sinus rhythm at a rate of 77 bpm.  EKG was normal.  •  Resting hypertension noted.  Blood pressure was 201/101.  •  Patient tolerated lexiscan well without complications, no aminophylline was administered  •  ST segments did not show any diagnostic changes.  No significant arrhythmia detected.  •  Left ventricular ejection fraction is hyperdynamic (Calculated EF > 70%).  •  Very mild basal lateral reversibility was seen which does not appear to be significant.  •  Myocardial perfusion imaging indicates a normal myocardial perfusion study with no evidence of ischemia.  •  Impressions are consistent with a low risk study.  •  Compared to the prior study from 9/29/2014 the current study reveals no changes.                 Assessment:     1. Subclavian arterial stenosis, left    2. Primary hypertension    3. Mixed hyperlipidemia    4. Mass of right lung          Plan:     Patient is 73 years old white female who recently had CT scan of the lung showing mass of the right lung possibly lung cancer and was also found to have subclavian artery stenosis by CT scan.  On examination however blood pressures are equal in both arms.  We will arrange CTA for further  evaluation.    Blood pressure is elevated patient was advised to increase lisinopril 15 mg nightly.  She will continue lisinopril HCT 10/12 point 5 in the morning    Hyperlipidemia  On Lipitor therapy.    Stress test and echo results were reviewed and discussed with the patient  Stress test is negative for significant exercise-induced myocardial ischemia with normal LV ejection fraction.    Echocardiogram shows normal ejection fraction, grade 1 LV diastolic dysfunction.  No significant valvular heart disease noted.    She is advised to keep a close eye on her blood pressure and call for further instructions.      No follow-ups on file.

## 2023-04-05 DIAGNOSIS — R93.89 ABNORMAL CT OF THE CHEST: Primary | ICD-10-CM

## 2023-04-11 ENCOUNTER — HOSPITAL ENCOUNTER (OUTPATIENT)
Dept: PET IMAGING | Facility: HOSPITAL | Age: 73
Discharge: HOME | End: 2023-04-11
Admitting: NURSE PRACTITIONER
Payer: MEDICARE

## 2023-04-11 DIAGNOSIS — R91.8 LUNG FIELD ABNORMAL: ICD-10-CM

## 2023-04-11 PROCEDURE — A9552 F18 FDG: HCPCS | Performed by: NURSE PRACTITIONER

## 2023-04-11 PROCEDURE — 78815 PET IMAGE W/CT SKULL-THIGH: CPT

## 2023-04-11 PROCEDURE — 0 FLUDEOXYGLUCOSE F18 SOLUTION: Performed by: NURSE PRACTITIONER

## 2023-04-11 PROCEDURE — 78815 PET IMAGE W/CT SKULL-THIGH: CPT | Performed by: RADIOLOGY

## 2023-04-11 RX ADMIN — FLUDEOXYGLUCOSE F18 1 DOSE: 300 INJECTION INTRAVENOUS at 09:43

## 2023-04-13 ENCOUNTER — TELEPHONE (OUTPATIENT)
Dept: CARDIOLOGY | Facility: CLINIC | Age: 73
End: 2023-04-13
Payer: MEDICARE

## 2023-04-13 ENCOUNTER — HOSPITAL ENCOUNTER (OUTPATIENT)
Dept: CT IMAGING | Facility: HOSPITAL | Age: 73
Discharge: HOME | End: 2023-04-13
Admitting: INTERNAL MEDICINE
Payer: MEDICARE

## 2023-04-13 DIAGNOSIS — I70.8 OCCLUSION OF LEFT SUBCLAVIAN ARTERY: Primary | ICD-10-CM

## 2023-04-13 DIAGNOSIS — R91.8 LUNG MASS: Primary | ICD-10-CM

## 2023-04-13 PROCEDURE — 25510000001 IOPAMIDOL PER 1 ML: Performed by: INTERNAL MEDICINE

## 2023-04-13 PROCEDURE — 71275 CT ANGIOGRAPHY CHEST: CPT | Performed by: RADIOLOGY

## 2023-04-13 PROCEDURE — 71275 CT ANGIOGRAPHY CHEST: CPT

## 2023-04-13 RX ADMIN — IOPAMIDOL 88 ML: 755 INJECTION, SOLUTION INTRAVENOUS at 14:00

## 2023-04-13 NOTE — PROGRESS NOTES
Discussed results of PET/CT.  Patient agreed for endobronchial ultrasound guided biopsy.  Given option to do it on 4/14/2023 but patient said she needs more time will do it around 2 May.

## 2023-04-13 NOTE — TELEPHONE ENCOUNTER
----- Message from Dalia Agosto MD sent at 4/13/2023  2:12 PM EDT -----  Urgent appointment with Dr. Stallworth for near complete occlusion of left subclavian artery.

## 2023-04-18 ENCOUNTER — OFFICE VISIT (OUTPATIENT)
Dept: PULMONOLOGY | Facility: CLINIC | Age: 73
End: 2023-04-18
Payer: MEDICARE

## 2023-04-18 VITALS
HEIGHT: 63 IN | RESPIRATION RATE: 18 BRPM | OXYGEN SATURATION: 90 % | DIASTOLIC BLOOD PRESSURE: 90 MMHG | WEIGHT: 208 LBS | HEART RATE: 77 BPM | BODY MASS INDEX: 36.86 KG/M2 | TEMPERATURE: 98 F | SYSTOLIC BLOOD PRESSURE: 140 MMHG

## 2023-04-18 DIAGNOSIS — Z87.891 FORMER SMOKER: ICD-10-CM

## 2023-04-18 DIAGNOSIS — E66.9 OBESITY (BMI 30-39.9): ICD-10-CM

## 2023-04-18 DIAGNOSIS — R91.8 MASS OF MIDDLE LOBE OF RIGHT LUNG: Primary | ICD-10-CM

## 2023-04-18 DIAGNOSIS — J96.11 CHRONIC RESPIRATORY FAILURE WITH HYPOXIA: ICD-10-CM

## 2023-04-18 DIAGNOSIS — J41.0 SIMPLE CHRONIC BRONCHITIS: ICD-10-CM

## 2023-04-18 NOTE — PROGRESS NOTES
Subjective      Chief Complaint  abnormal CT    Subjective      History of Present Illness  Carpice Rodriguez is a 73 y.o. female who presents today to [facility] with past medical history of essential hypertension, hyperlipidemia, hypothyroidism, and COPD. This visit is a follow up appointment.     abnormal CT:  Patient reports that her breathing is currently at baseline. She states that she has been having pain along her right rib cage for the past several weeks that causes some pain and occasional difficulty with breathing. She states that she has been receiving injections for pain at her PCP office. She continues to use Symbicort 2 puffs twice daily. She states that she tried Trelegy in the past but could not tolerate it. She continues to use her 2 L supplemental oxygen with exertion and as needed.     She did have a CT scan performed in March which revealed a 2.5 x 2.0 cm right middle lobe mass with adjacent 12 mm spiculated mass abutting the superior right heart border. Her CT scan also revealed stenosis of the left subclavian artery and is scheduled for appointment with Dr. Stallworth. She had a PET/CT scan which revealed hypermetabolism of the mass in the right middle lobe as well as hypermetabolism of the right hilum, anterior maxilla which could be due to inflammation due to dental disease versus bone metastasis, right lateral fifth rib with pathologic fracture, lytic bone lesion of the right posterior 7th rib, lytic bone lesion of the right iliac wing, and nonenlarged upper cervical lymph nodes revealing low-grade hypermetabolism predominantly in the range of inflammation. She is scheduled to have a bronchoscopy on May 2nd.       Current Outpatient Medications:   •  albuterol (ACCUNEB) 0.63 MG/3ML nebulizer solution, USE 1 VIAL IN NEBULIZER EVERY 6 HOURS, Disp: , Rfl:   •  amitriptyline (ELAVIL) 25 MG tablet, Take 1 tablet by mouth Daily., Disp: , Rfl:   •   atenolol (TENORMIN) 25 MG tablet, Take 1 tablet by mouth Daily., Disp: , Rfl:   •  atorvastatin (LIPITOR) 20 MG tablet, Take 1 tablet by mouth Daily., Disp: , Rfl:   •  Budesonide-Formoterol Fumarate (SYMBICORT IN), Inhale 2 puffs 2 (two) times a day as needed., Disp: , Rfl:   •  Calcium Carbonate-Vitamin D (CALCIUM 600+D3 PO), Take  by mouth., Disp: , Rfl:   •  celecoxib (CeleBREX) 200 MG capsule, TAKE ONE CAPSULE BY MOUTH ONCE DAILY FOR ARTHRITIS, Disp: , Rfl:   •  Cholecalciferol (Vitamin D3) 1.25 MG (23059 UT) capsule, Take 1 capsule by mouth 1 (One) Time Per Week., Disp: , Rfl:   •  ibuprofen (ADVIL,MOTRIN) 600 MG tablet, , Disp: , Rfl:   •  levothyroxine (SYNTHROID, LEVOTHROID) 112 MCG tablet, Take 1 tablet by mouth Daily., Disp: , Rfl:   •  lisinopril (PRINIVIL,ZESTRIL) 10 MG tablet, Take 1 tablet by mouth Every Night., Disp: , Rfl:   •  lisinopril-hydrochlorothiazide (PRINZIDE,ZESTORETIC) 10-12.5 MG per tablet, Take 1 tablet by mouth Every Morning., Disp: , Rfl:   •  meloxicam (MOBIC) 7.5 MG tablet, Take 1 tablet by mouth Daily., Disp: , Rfl:   •  methocarbamol (ROBAXIN) 500 MG tablet, Take 1 tablet by mouth 2 (Two) Times a Day., Disp: , Rfl:   •  nitroglycerin (NITROSTAT) 0.4 MG SL tablet, , Disp: , Rfl:   •  omeprazole (PriLOSEC) 20 MG capsule, Take 1 capsule by mouth 2 (Two) Times a Day., Disp: , Rfl:   •  potassium chloride (K-DUR) 10 MEQ CR tablet, Take 1 tablet by mouth Daily., Disp: , Rfl:   •  predniSONE (DELTASONE) 20 MG tablet, Take 2 tablets by mouth Daily., Disp: 10 tablet, Rfl: 0  •  tolterodine LA (DETROL LA) 4 MG 24 hr capsule, Take 1 capsule by mouth Daily., Disp: , Rfl:   •  topiramate (TOPAMAX) 50 MG tablet, Take 1 tablet by mouth Daily., Disp: , Rfl:   •  traMADol (ULTRAM) 50 MG tablet, Take 1 tablet by mouth Every 8 (Eight) Hours As Needed., Disp: , Rfl:   •  vitamin B-12 (CYANOCOBALAMIN) 1000 MCG tablet, Take 1 tablet by mouth Daily., Disp: , Rfl:       Allergies   Allergen Reactions   •  "Codeine        Objective     Objective   Vital Signs:  /90   Pulse 77   Temp 98 °F (36.7 °C) (Temporal)   Resp 18   Ht 160 cm (63\")   Wt 94.3 kg (208 lb)   SpO2 90%   BMI 36.85 kg/m²   Estimated body mass index is 36.85 kg/m² as calculated from the following:    Height as of this encounter: 160 cm (63\").    Weight as of this encounter: 94.3 kg (208 lb).    Past Medical History:   Diagnosis Date   • Arthritis    • Chest pain    • COPD (chronic obstructive pulmonary disease)    • GERD (gastroesophageal reflux disease)    • Hyperlipidemia    • Hypertension    • Hypokalemia    • Hypothyroidism    • Pericardial effusion, trivial      Past Surgical History:   Procedure Laterality Date   • APPENDECTOMY     • CARDIAC CATHETERIZATION     • CARDIOVASCULAR STRESS TEST  2014   • CATARACT EXTRACTION, BILATERAL Bilateral      right eye, Dr Hauser  and  2017 left eye Dr Meyer   • ECHO - CONVERTED  2014   • GALLBLADDER SURGERY     • HERNIA REPAIR     • TUBAL ABDOMINAL LIGATION       Social History     Socioeconomic History   • Marital status:    Tobacco Use   • Smoking status: Former     Packs/day: 2.00     Years: 20.00     Pack years: 40.00     Types: Cigarettes     Quit date:      Years since quittin.3   • Smokeless tobacco: Never   Vaping Use   • Vaping Use: Never used   Substance and Sexual Activity   • Alcohol use: Not Currently     Alcohol/week: 1.0 standard drink     Types: 1 Cans of beer per week     Comment: occasional fire ball for sickness   • Drug use: No   • Sexual activity: Defer      Physical Exam  Constitutional:       Appearance: Normal appearance. She is obese.   HENT:      Head: Normocephalic and atraumatic.      Nose: Nose normal.      Mouth/Throat:      Mouth: Mucous membranes are moist.      Pharynx: Oropharynx is clear.   Eyes:      Extraocular Movements: Extraocular movements intact.      Conjunctiva/sclera: Conjunctivae normal.      Pupils: Pupils are equal, " round, and reactive to light.   Cardiovascular:      Rate and Rhythm: Normal rate and regular rhythm.      Pulses: Normal pulses.      Heart sounds: Normal heart sounds. No murmur heard.    No friction rub. No gallop.   Pulmonary:      Effort: Pulmonary effort is normal. No respiratory distress.      Breath sounds: Normal breath sounds. No wheezing, rhonchi or rales.      Comments: Currently tolerating room air.   Musculoskeletal:         General: Normal range of motion.      Cervical back: Normal range of motion.   Skin:     General: Skin is warm and dry.   Neurological:      General: No focal deficit present.      Mental Status: She is alert and oriented to person, place, and time. Mental status is at baseline.   Psychiatric:         Mood and Affect: Mood normal.         Behavior: Behavior normal.         Thought Content: Thought content normal.       Result Review :  The following labs and radiology results have been reviewed.    Lab Review:   No results found for: FEV1, FVC, [id number], TLC, DLCO  Hospital Outpatient Visit on 01/20/2023   Component Date Value Ref Range Status   • Target HR (85%) 01/20/2023 124  bpm Final   • Max. Pred. HR (100%) 01/20/2023 146  bpm Final   • LVIDd 01/20/2023 5.0  cm Final   • LVIDs 01/20/2023 3.5  cm Final   • IVSd 01/20/2023 1.14  cm Final   • LVPWd 01/20/2023 1.13  cm Final   • FS 01/20/2023 30.7  % Final   • IVS/LVPW 01/20/2023 1.02  cm Final   • ESV(cubed) 01/20/2023 42.3  ml Final   • LV Sys Vol (BSA corrected) 01/20/2023 16.1  cm2 Final   • EDV(cubed) 01/20/2023 127.3  ml Final   • LV Willis Vol (BSA corrected) 01/20/2023 38.8  cm2 Final   • LVOT area 01/20/2023 2.5  cm2 Final   • LV mass(C)d 01/20/2023 218.4  grams Final   • LVOT diam 01/20/2023 1.80  cm Final   • EDV(MOD-sp4) 01/20/2023 76.8  ml Final   • ESV(MOD-sp4) 01/20/2023 31.8  ml Final   • SV(MOD-sp4) 01/20/2023 45.0  ml Final   • SI(MOD-sp4) 01/20/2023 22.7  ml/m2 Final   • EF(MOD-sp4) 01/20/2023 58.6  % Final   • MV  E max lenin 01/20/2023 80.6  cm/sec Final   • MV A max lenin 01/20/2023 114.0  cm/sec Final   • MV E/A 01/20/2023 0.71   Final   • Med Peak E' Lenin 01/20/2023 4.5  cm/sec Final   • Lat Peak E' Lenin 01/20/2023 6.4  cm/sec Final   • Avg E/e' ratio 01/20/2023 14.79   Final   • SV(LVOT) 01/20/2023 79.9  ml Final   • TAPSE (>1.6) 01/20/2023 2.03  cm Final   • LA dimension (2D)  01/20/2023 3.5  cm Final   • LV V1 max 01/20/2023 120.0  cm/sec Final   • LV V1 max PG 01/20/2023 5.8  mmHg Final   • LV V1 mean PG 01/20/2023 3.0  mmHg Final   • LV V1 VTI 01/20/2023 31.4  cm Final   • Ao pk lenin 01/20/2023 164.0  cm/sec Final   • Ao max PG 01/20/2023 10.8  mmHg Final   • Ao mean PG 01/20/2023 6.0  mmHg Final   • Ao V2 VTI 01/20/2023 38.2  cm Final   • JJ(I,D) 01/20/2023 2.09  cm2 Final   • PA acc time 01/20/2023 0.11  sec Final   • PA pr(Accel) 01/20/2023 30.0  mmHg Final   • Ao root diam 01/20/2023 2.8  cm Final   • ACS 01/20/2023 1.40  cm Final   Hospital Outpatient Visit on 01/20/2023   Component Date Value Ref Range Status   • Target HR (85%) 01/20/2023 124  bpm Final   • Max. Pred. HR (100%) 01/20/2023 146  bpm Final   • Nuclear Prior Study 01/20/2023 3.0   Final   • BH CV REST NUCLEAR ISOTOPE DOSE 01/20/2023 10.2  mCi Final   • BH CV STRESS NUCLEAR ISOTOPE DOSE 01/20/2023 30.0  mCi Final   • BH CV STRESS PROTOCOL 1 01/20/2023 Pharmacologic   Final   • Stage 1 01/20/2023 1   Final   • HR Stage 1 01/20/2023 95   Final   • BP Stage 1 01/20/2023 224/105   Final   • Duration Min Stage 1 01/20/2023 0   Final   • Duration Sec Stage 1 01/20/2023 10   Final   • Stress Dose Regadenoson Stage 1 01/20/2023 0.4   Final   • Stress Comments Stage 1 01/20/2023 10 sec bolus injection   Final   • Baseline HR 01/20/2023 77  bpm Final   • Baseline BP 01/20/2023 201/101  mmHg Final   • Peak HR 01/20/2023 95  bpm Final   • Percent Max Pred HR 01/20/2023 65.07  % Final   • Percent Target HR 01/20/2023 77  % Final   • Peak BP 01/20/2023 224/105  mmHg  Final   • Recovery HR 01/20/2023 85  bpm Final   • Recovery BP 01/20/2023 191/105  mmHg Final   • Nuc Stress EF 01/20/2023 71  % Final      Reviewed previous CT chest report from 03/30/2023    Reviewed previous PET/CT from 04/11/2023    Reviewed previous PFT from 12/2021    Assessment / Plan         Assessment   Diagnoses and all orders for this visit:    1. Mass of middle lobe of right lung (Primary)  · PET/CT scan revealed hypermetabolism of the mass in the right middle lobe as well as hypermetabolism of the right hilum, anterior maxilla which could be due to inflammation due to dental disease versus bone metastasis, right lateral fifth rib with pathologic fracture, lytic bone lesion of the right posterior 7th rib and of the right iliac wing, and nonenlarged upper cervical lymph nodes revealing low-grade hypermetabolism predominantly in the range of inflammation.    · Scheduled to have bronchoscopy May 2nd for biopsy of lung mass.     2. Simple chronic bronchitis  3. Former smoker  · Continue albuterol inhaler 2 puffs every 4-6 hours as needed.   · Continue Symbicort inhaler 2 puffs twice daily.     4. Chronic respiratory failure with hypoxia'  5. Obesity (BMI 30-39.9)  · Compliant with supplemental oxygen use at night, with exertion, and as needed.     I spent 30 minutes caring for Caprice on this date of service. This time includes time spent by me in the following activities:preparing for the visit, reviewing tests, obtaining and/or reviewing a separately obtained history, performing a medically appropriate examination and/or evaluation , counseling and educating the patient/family/caregiver, ordering medications, tests, or procedures, documenting information in the medical record, independently interpreting results and communicating that information with the patient/family/caregiver and care coordination    Follow Up   Return in about 1 month (around 5/18/2023), or if symptoms worsen or fail to improve, for  Next scheduled follow up.    Patient was given instructions and counseling regarding her condition or for health maintenance advice. Please see specific information pulled into the AVS if appropriate.       This document has been electronically signed by Joanna Peralta PA-C   April 19, 2023 10:47 EDT    Dictated Utilizing Dragon Dictation: Part of this note may be an electronic transcription/translation of spoken language to printed text using the Dragon Dictation System.

## 2023-04-20 ENCOUNTER — APPOINTMENT (OUTPATIENT)
Dept: GENERAL RADIOLOGY | Facility: HOSPITAL | Age: 73
End: 2023-04-20
Payer: MEDICARE

## 2023-04-20 ENCOUNTER — HOSPITAL ENCOUNTER (EMERGENCY)
Facility: HOSPITAL | Age: 73
Discharge: HOME | End: 2023-04-20
Attending: STUDENT IN AN ORGANIZED HEALTH CARE EDUCATION/TRAINING PROGRAM
Payer: MEDICARE

## 2023-04-20 VITALS
SYSTOLIC BLOOD PRESSURE: 170 MMHG | TEMPERATURE: 99 F | HEIGHT: 63 IN | WEIGHT: 208 LBS | HEART RATE: 85 BPM | OXYGEN SATURATION: 96 % | BODY MASS INDEX: 36.86 KG/M2 | DIASTOLIC BLOOD PRESSURE: 80 MMHG | RESPIRATION RATE: 16 BRPM

## 2023-04-20 DIAGNOSIS — J44.1 COPD WITH ACUTE EXACERBATION: Primary | ICD-10-CM

## 2023-04-20 DIAGNOSIS — J20.9 ACUTE EXACERBATION OF CHRONIC BRONCHITIS: ICD-10-CM

## 2023-04-20 DIAGNOSIS — J42 ACUTE EXACERBATION OF CHRONIC BRONCHITIS: ICD-10-CM

## 2023-04-20 DIAGNOSIS — C34.90 MALIGNANT NEOPLASM OF LUNG, UNSPECIFIED LATERALITY, UNSPECIFIED PART OF LUNG: ICD-10-CM

## 2023-04-20 LAB
ALBUMIN SERPL-MCNC: 3.7 G/DL (ref 3.5–5.2)
ALBUMIN/GLOB SERPL: 1.1 G/DL
ALP SERPL-CCNC: 79 U/L (ref 39–117)
ALT SERPL W P-5'-P-CCNC: 16 U/L (ref 1–33)
ANION GAP SERPL CALCULATED.3IONS-SCNC: 11.2 MMOL/L (ref 5–15)
AST SERPL-CCNC: 35 U/L (ref 1–32)
BASOPHILS # BLD AUTO: 0.03 10*3/MM3 (ref 0–0.2)
BASOPHILS NFR BLD AUTO: 0.5 % (ref 0–1.5)
BILIRUB SERPL-MCNC: 0.6 MG/DL (ref 0–1.2)
BUN SERPL-MCNC: 18 MG/DL (ref 8–23)
BUN/CREAT SERPL: 18.2 (ref 7–25)
CALCIUM SPEC-SCNC: 9.8 MG/DL (ref 8.6–10.5)
CHLORIDE SERPL-SCNC: 102 MMOL/L (ref 98–107)
CO2 SERPL-SCNC: 24.8 MMOL/L (ref 22–29)
CREAT SERPL-MCNC: 0.99 MG/DL (ref 0.57–1)
DEPRECATED RDW RBC AUTO: 45.1 FL (ref 37–54)
EGFRCR SERPLBLD CKD-EPI 2021: 59.6 ML/MIN/1.73
EOSINOPHIL # BLD AUTO: 0.17 10*3/MM3 (ref 0–0.4)
EOSINOPHIL NFR BLD AUTO: 2.9 % (ref 0.3–6.2)
ERYTHROCYTE [DISTWIDTH] IN BLOOD BY AUTOMATED COUNT: 15.1 % (ref 12.3–15.4)
GEN 5 2HR TROPONIN T REFLEX: 15 NG/L
GLOBULIN UR ELPH-MCNC: 3.4 GM/DL
GLUCOSE SERPL-MCNC: 137 MG/DL (ref 65–99)
HCT VFR BLD AUTO: 40.2 % (ref 34–46.6)
HGB BLD-MCNC: 13.1 G/DL (ref 12–15.9)
HOLD SPECIMEN: NORMAL
HOLD SPECIMEN: NORMAL
IMM GRANULOCYTES # BLD AUTO: 0.04 10*3/MM3 (ref 0–0.05)
IMM GRANULOCYTES NFR BLD AUTO: 0.7 % (ref 0–0.5)
LYMPHOCYTES # BLD AUTO: 0.8 10*3/MM3 (ref 0.7–3.1)
LYMPHOCYTES NFR BLD AUTO: 13.5 % (ref 19.6–45.3)
MCH RBC QN AUTO: 26.7 PG (ref 26.6–33)
MCHC RBC AUTO-ENTMCNC: 32.6 G/DL (ref 31.5–35.7)
MCV RBC AUTO: 81.9 FL (ref 79–97)
MONOCYTES # BLD AUTO: 0.96 10*3/MM3 (ref 0.1–0.9)
MONOCYTES NFR BLD AUTO: 16.2 % (ref 5–12)
NEUTROPHILS NFR BLD AUTO: 3.94 10*3/MM3 (ref 1.7–7)
NEUTROPHILS NFR BLD AUTO: 66.2 % (ref 42.7–76)
NRBC BLD AUTO-RTO: 0 /100 WBC (ref 0–0.2)
NT-PROBNP SERPL-MCNC: 244.6 PG/ML (ref 0–1800)
PLATELET # BLD AUTO: 174 10*3/MM3 (ref 140–450)
PMV BLD AUTO: 9.7 FL (ref 6–12)
POTASSIUM SERPL-SCNC: 3.6 MMOL/L (ref 3.5–5.2)
PROT SERPL-MCNC: 7.1 G/DL (ref 6–8.5)
QT INTERVAL: 352 MS
QTC INTERVAL: 435 MS
RBC # BLD AUTO: 4.91 10*6/MM3 (ref 3.77–5.28)
SODIUM SERPL-SCNC: 138 MMOL/L (ref 136–145)
TROPONIN T DELTA: -2 NG/L
TROPONIN T SERPL HS-MCNC: 17 NG/L
WBC NRBC COR # BLD: 5.94 10*3/MM3 (ref 3.4–10.8)
WHOLE BLOOD HOLD COAG: NORMAL
WHOLE BLOOD HOLD SPECIMEN: NORMAL

## 2023-04-20 PROCEDURE — 96375 TX/PRO/DX INJ NEW DRUG ADDON: CPT

## 2023-04-20 PROCEDURE — 94799 UNLISTED PULMONARY SVC/PX: CPT

## 2023-04-20 PROCEDURE — 25010000002 METHYLPREDNISOLONE PER 125 MG: Performed by: EMERGENCY MEDICINE

## 2023-04-20 PROCEDURE — 80053 COMPREHEN METABOLIC PANEL: CPT | Performed by: EMERGENCY MEDICINE

## 2023-04-20 PROCEDURE — 94640 AIRWAY INHALATION TREATMENT: CPT

## 2023-04-20 PROCEDURE — 96374 THER/PROPH/DIAG INJ IV PUSH: CPT

## 2023-04-20 PROCEDURE — 99285 EMERGENCY DEPT VISIT HI MDM: CPT

## 2023-04-20 PROCEDURE — 83880 ASSAY OF NATRIURETIC PEPTIDE: CPT | Performed by: EMERGENCY MEDICINE

## 2023-04-20 PROCEDURE — 71045 X-RAY EXAM CHEST 1 VIEW: CPT

## 2023-04-20 PROCEDURE — 84484 ASSAY OF TROPONIN QUANT: CPT | Performed by: STUDENT IN AN ORGANIZED HEALTH CARE EDUCATION/TRAINING PROGRAM

## 2023-04-20 PROCEDURE — 25010000002 ONDANSETRON PER 1 MG: Performed by: EMERGENCY MEDICINE

## 2023-04-20 PROCEDURE — 36415 COLL VENOUS BLD VENIPUNCTURE: CPT

## 2023-04-20 PROCEDURE — 85025 COMPLETE CBC W/AUTO DIFF WBC: CPT | Performed by: EMERGENCY MEDICINE

## 2023-04-20 PROCEDURE — 93005 ELECTROCARDIOGRAM TRACING: CPT | Performed by: EMERGENCY MEDICINE

## 2023-04-20 RX ORDER — HYDRALAZINE HYDROCHLORIDE 20 MG/ML
10 INJECTION INTRAMUSCULAR; INTRAVENOUS ONCE
Status: DISCONTINUED | OUTPATIENT
Start: 2023-04-20 | End: 2023-04-20

## 2023-04-20 RX ORDER — ONDANSETRON 2 MG/ML
4 INJECTION INTRAMUSCULAR; INTRAVENOUS ONCE
Status: COMPLETED | OUTPATIENT
Start: 2023-04-20 | End: 2023-04-20

## 2023-04-20 RX ORDER — SODIUM CHLORIDE 0.9 % (FLUSH) 0.9 %
10 SYRINGE (ML) INJECTION AS NEEDED
Status: DISCONTINUED | OUTPATIENT
Start: 2023-04-20 | End: 2023-04-20 | Stop reason: HOSPADM

## 2023-04-20 RX ORDER — IPRATROPIUM BROMIDE AND ALBUTEROL SULFATE 2.5; .5 MG/3ML; MG/3ML
3 SOLUTION RESPIRATORY (INHALATION) ONCE
Status: COMPLETED | OUTPATIENT
Start: 2023-04-20 | End: 2023-04-20

## 2023-04-20 RX ORDER — PREDNISONE 20 MG/1
60 TABLET ORAL DAILY
Qty: 15 TABLET | Refills: 0 | Status: SHIPPED | OUTPATIENT
Start: 2023-04-20 | End: 2023-04-25

## 2023-04-20 RX ORDER — CLONIDINE HYDROCHLORIDE 0.1 MG/1
0.1 TABLET ORAL ONCE
Status: COMPLETED | OUTPATIENT
Start: 2023-04-20 | End: 2023-04-20

## 2023-04-20 RX ORDER — METHYLPREDNISOLONE SODIUM SUCCINATE 125 MG/2ML
125 INJECTION, POWDER, LYOPHILIZED, FOR SOLUTION INTRAMUSCULAR; INTRAVENOUS ONCE
Status: COMPLETED | OUTPATIENT
Start: 2023-04-20 | End: 2023-04-20

## 2023-04-20 RX ADMIN — IPRATROPIUM BROMIDE AND ALBUTEROL SULFATE 3 ML: .5; 2.5 SOLUTION RESPIRATORY (INHALATION) at 00:59

## 2023-04-20 RX ADMIN — IPRATROPIUM BROMIDE AND ALBUTEROL SULFATE 3 ML: .5; 2.5 SOLUTION RESPIRATORY (INHALATION) at 00:49

## 2023-04-20 RX ADMIN — ONDANSETRON 4 MG: 2 INJECTION INTRAMUSCULAR; INTRAVENOUS at 04:58

## 2023-04-20 RX ADMIN — CLONIDINE HYDROCHLORIDE 0.1 MG: 0.1 TABLET ORAL at 02:25

## 2023-04-20 RX ADMIN — METHYLPREDNISOLONE SODIUM SUCCINATE 125 MG: 125 INJECTION, POWDER, FOR SOLUTION INTRAMUSCULAR; INTRAVENOUS at 01:08

## 2023-04-20 NOTE — DISCHARGE INSTRUCTIONS
Please continue the antibiotics prescribed by PCP, and use the nebulizers every 4 to 6 hours, till symptoms improve.    Prescription called in at local pharmacy for prednisone, make sure that you pick it up and start taking the steroid this morning.    Please keep appointment with oncologist for next week, as previously scheduled.    If any new/acute symptoms or worsening of current presentation please go to the closest urgent care or emergency room for prompt reevaluation.

## 2023-04-20 NOTE — ED PROVIDER NOTES
Subjective   History of Present Illness  73-year-old female with past medical history of hypertension hyperlipidemia presents to the ER with primary complaint of increasing shortness of breath.  Patient noted cough and congestion.  Patient feels like she has congestion in her chest as well.  Patient was seen by her PCP and given an oral antibiotic.  Patient noted no improvement in symptoms.  However, patient has only had 1 day of this new prescription.  No other obvious aggravating or alleviating factors.  Vital signs stable.  Afebrile        Review of Systems   Respiratory: Positive for cough and shortness of breath.    All other systems reviewed and are negative.      Past Medical History:   Diagnosis Date   • Arthritis    • Chest pain    • COPD (chronic obstructive pulmonary disease)    • GERD (gastroesophageal reflux disease)    • Hyperlipidemia    • Hypertension    • Hypokalemia    • Hypothyroidism    • Pericardial effusion, trivial        Allergies   Allergen Reactions   • Codeine        Past Surgical History:   Procedure Laterality Date   • APPENDECTOMY     • CARDIAC CATHETERIZATION  2008   • CARDIOVASCULAR STRESS TEST  09/2014   • CATARACT EXTRACTION, BILATERAL Bilateral     2015 right eye, Dr Hauser  and  2/2017 left eye Dr Meyer   • ECHO - CONVERTED  09/2014   • GALLBLADDER SURGERY     • HERNIA REPAIR     • TUBAL ABDOMINAL LIGATION         Family History   Problem Relation Age of Onset   • Heart disease Mother    • Diabetes Mother    • Heart attack Mother         cause of death   • Cirrhosis Father         cause of death   • Cancer Sister         liver ca   • Cancer Brother         lung ca   • Cancer Brother         colon ca   • Heart disease Brother    • Heart failure Brother    • Cancer Sister         lung ca   • Heart failure Sister    • Cancer Sister        Social History     Socioeconomic History   • Marital status:    Tobacco Use   • Smoking status: Former     Packs/day: 2.00     Years: 20.00      Pack years: 40.00     Types: Cigarettes     Quit date:      Years since quittin.3   • Smokeless tobacco: Never   Vaping Use   • Vaping Use: Never used   Substance and Sexual Activity   • Alcohol use: Not Currently     Alcohol/week: 1.0 standard drink     Types: 1 Cans of beer per week     Comment: occasional fire ball for sickness   • Drug use: No   • Sexual activity: Defer           Objective   Physical Exam  Constitutional:       General: She is not in acute distress.     Appearance: Normal appearance. She is not ill-appearing.   HENT:      Head: Normocephalic and atraumatic.      Right Ear: External ear normal.      Left Ear: External ear normal.      Nose: Nose normal.      Mouth/Throat:      Mouth: Mucous membranes are moist.   Eyes:      Extraocular Movements: Extraocular movements intact.      Pupils: Pupils are equal, round, and reactive to light.   Cardiovascular:      Rate and Rhythm: Normal rate and regular rhythm.      Heart sounds: No murmur heard.  Pulmonary:      Effort: Pulmonary effort is normal. No respiratory distress.      Breath sounds: Examination of the right-upper field reveals wheezing. Examination of the left-upper field reveals wheezing. Wheezing present.   Abdominal:      General: Bowel sounds are normal.      Palpations: Abdomen is soft.      Tenderness: There is no abdominal tenderness.   Musculoskeletal:         General: No deformity or signs of injury. Normal range of motion.      Cervical back: Normal range of motion and neck supple.   Skin:     General: Skin is warm and dry.      Findings: No erythema.   Neurological:      General: No focal deficit present.      Mental Status: She is alert and oriented to person, place, and time. Mental status is at baseline.      Cranial Nerves: No cranial nerve deficit.   Psychiatric:         Mood and Affect: Mood normal.         Behavior: Behavior normal.         Thought Content: Thought content normal.         Procedures            ED Course  ED Course as of 04/21/23 0523   u Apr 20, 2023   0040 EKG notes sinus rhythm.  92 bpm.  No acute ST elevation.  QTc 435.    Electronically signed by Hector Cao DO, 04/20/23, 12:40 AM EDT.   [SF]   0200 Care assumed from Dr. Cao, at the end of his shift, patient here with shortness of breath, was at doctor's office earlier where she received antibiotics and neb treatment. [DS]   0201 Chest x-ray 1 view:  IMPRESSION:  1. Cardiac enlargement. No evidence of CHF.  2. Patient has a known malignant right middle lobe mass and known right-sided rib lesions. [DS]   0203 Patient does wear oxygen at home as needed. [SF]   0203 Care of this patient was transferred to the next attending physician at shift change.  Complete discussion of presentation, labs, imaging, care, and expected course occurred during transition of providers.  Vitals stable at transfer of care. [SF]   0255 Patient reevaluated, appears in no distress, wheezing significantly improved, patient found to be hypertensive, treated her blood pressure with clonidine, given a shot of Solu-Medrol.  Since she is already on antibiotics we will add a prescription for prednisone and discharge her home to use her neb treatments every 4 hours.    Exam was completed in the setting of the COVID-19 pandemic.  Counseling: Discussed today's findings, in addition to providing specific details for the plan of care.  Questions are answered and there is agreement with the plan.  Counseling was provided regarding the diagnosis and prognosis. Discussed supportive care, the specific conditions for return, as well as the importance of follow-up. Advised to recheck here immediately with new or worsening symptoms.    Patient does have a ride home at this time, she will have to wait a couple of hours for family member to pick her up. [DS]      ED Course User Index  [DS] Mark Romero MD  [SF] Hector Cao DO                                            MDM    Final diagnoses:   COPD with acute exacerbation   Acute exacerbation of chronic bronchitis   Malignant neoplasm of lung, unspecified laterality, unspecified part of lung       ED Disposition  ED Disposition     ED Disposition   Discharge    Condition   Stable    Comment   --             Roxie Russo, APRN  [address]  [facility] [address]  [phone]    Schedule an appointment as soon as possible for a visit in 2 days  As needed, If symptoms worsen    [facility] Emergency Department  [address] [place] [id number] [phone]    if unable to see PCP         Medication List      Changed    * predniSONE 20 MG tablet  Commonly known as: DELTASONE  Take 2 tablets by mouth Daily.  What changed: Another medication with the same name was added. Make sure you understand how and when to take each.     * predniSONE 20 MG tablet  Commonly known as: DELTASONE  Take 3 tablets by mouth Daily for 5 days.  What changed: You were already taking a medication with the same name, and this prescription was added. Make sure you understand how and when to take each.         * This list has 2 medication(s) that are the same as other medications prescribed for you. Read the directions carefully, and ask your doctor or other care provider to review them with you.               Where to Get Your Medications      These medications were sent to [facility] - [place], [place] - [address] [facility] - [phone] [facility] [phone] FX  [address] [address]    Phone: [phone]   · predniSONE 20 MG tablet          Mark Romero MD  04/21/23 9846

## 2023-05-02 ENCOUNTER — ANESTHESIA (OUTPATIENT)
Dept: PERIOP | Facility: HOSPITAL | Age: 73
End: 2023-05-02
Payer: MEDICARE

## 2023-05-02 ENCOUNTER — HOSPITAL ENCOUNTER (OUTPATIENT)
Facility: HOSPITAL | Age: 73
Setting detail: HOSPITAL OUTPATIENT SURGERY
Discharge: HOME | End: 2023-05-02
Attending: INTERNAL MEDICINE | Admitting: INTERNAL MEDICINE
Payer: MEDICARE

## 2023-05-02 ENCOUNTER — ANESTHESIA EVENT (OUTPATIENT)
Dept: PERIOP | Facility: HOSPITAL | Age: 73
End: 2023-05-02
Payer: MEDICARE

## 2023-05-02 VITALS
DIASTOLIC BLOOD PRESSURE: 87 MMHG | HEIGHT: 63 IN | TEMPERATURE: 97.8 F | WEIGHT: 208 LBS | OXYGEN SATURATION: 94 % | BODY MASS INDEX: 36.86 KG/M2 | SYSTOLIC BLOOD PRESSURE: 140 MMHG | RESPIRATION RATE: 20 BRPM | HEART RATE: 99 BPM

## 2023-05-02 DIAGNOSIS — R91.8 LUNG MASS: ICD-10-CM

## 2023-05-02 LAB
ALBUMIN SERPL-MCNC: 3.6 G/DL (ref 3.5–5.2)
ALBUMIN/GLOB SERPL: 1 G/DL
ALP SERPL-CCNC: 83 U/L (ref 39–117)
ALT SERPL W P-5'-P-CCNC: 11 U/L (ref 1–33)
ANION GAP SERPL CALCULATED.3IONS-SCNC: 7.8 MMOL/L (ref 5–15)
AST SERPL-CCNC: 23 U/L (ref 1–32)
BILIRUB SERPL-MCNC: 0.7 MG/DL (ref 0–1.2)
BUN SERPL-MCNC: 11 MG/DL (ref 8–23)
BUN/CREAT SERPL: 15.1 (ref 7–25)
CALCIUM SPEC-SCNC: 10.1 MG/DL (ref 8.6–10.5)
CHLORIDE SERPL-SCNC: 105 MMOL/L (ref 98–107)
CO2 SERPL-SCNC: 30.2 MMOL/L (ref 22–29)
CREAT SERPL-MCNC: 0.73 MG/DL (ref 0.57–1)
DEPRECATED RDW RBC AUTO: 46.7 FL (ref 37–54)
EGFRCR SERPLBLD CKD-EPI 2021: 85.9 ML/MIN/1.73
ERYTHROCYTE [DISTWIDTH] IN BLOOD BY AUTOMATED COUNT: 15.3 % (ref 12.3–15.4)
GLOBULIN UR ELPH-MCNC: 3.5 GM/DL
GLUCOSE SERPL-MCNC: 127 MG/DL (ref 65–99)
HCT VFR BLD AUTO: 40.8 % (ref 34–46.6)
HGB BLD-MCNC: 13.1 G/DL (ref 12–15.9)
INR PPP: 1.05 (ref 0.9–1.1)
MCH RBC QN AUTO: 27.1 PG (ref 26.6–33)
MCHC RBC AUTO-ENTMCNC: 32.1 G/DL (ref 31.5–35.7)
MCV RBC AUTO: 84.3 FL (ref 79–97)
PLATELET # BLD AUTO: 211 10*3/MM3 (ref 140–450)
PMV BLD AUTO: 9.1 FL (ref 6–12)
POTASSIUM SERPL-SCNC: 3.3 MMOL/L (ref 3.5–5.2)
PROT SERPL-MCNC: 7.1 G/DL (ref 6–8.5)
PROTHROMBIN TIME: 14.2 SECONDS (ref 12.1–14.7)
RBC # BLD AUTO: 4.84 10*6/MM3 (ref 3.77–5.28)
SODIUM SERPL-SCNC: 143 MMOL/L (ref 136–145)
WBC NRBC COR # BLD: 7.23 10*3/MM3 (ref 3.4–10.8)

## 2023-05-02 PROCEDURE — 85610 PROTHROMBIN TIME: CPT | Performed by: INTERNAL MEDICINE

## 2023-05-02 PROCEDURE — 94799 UNLISTED PULMONARY SVC/PX: CPT

## 2023-05-02 PROCEDURE — 87116 MYCOBACTERIA CULTURE: CPT | Performed by: INTERNAL MEDICINE

## 2023-05-02 PROCEDURE — 31623 DX BRONCHOSCOPE/BRUSH: CPT | Performed by: INTERNAL MEDICINE

## 2023-05-02 PROCEDURE — 31652 BRONCH EBUS SAMPLNG 1/2 NODE: CPT | Performed by: INTERNAL MEDICINE

## 2023-05-02 PROCEDURE — 87071 CULTURE AEROBIC QUANT OTHER: CPT | Performed by: INTERNAL MEDICINE

## 2023-05-02 PROCEDURE — 94640 AIRWAY INHALATION TREATMENT: CPT

## 2023-05-02 PROCEDURE — 87206 SMEAR FLUORESCENT/ACID STAI: CPT | Performed by: INTERNAL MEDICINE

## 2023-05-02 PROCEDURE — 87070 CULTURE OTHR SPECIMN AEROBIC: CPT | Performed by: INTERNAL MEDICINE

## 2023-05-02 PROCEDURE — 85027 COMPLETE CBC AUTOMATED: CPT | Performed by: INTERNAL MEDICINE

## 2023-05-02 PROCEDURE — 25010000002 PROPOFOL 200 MG/20ML EMULSION: Performed by: NURSE ANESTHETIST, CERTIFIED REGISTERED

## 2023-05-02 PROCEDURE — 87205 SMEAR GRAM STAIN: CPT | Performed by: INTERNAL MEDICINE

## 2023-05-02 PROCEDURE — 87102 FUNGUS ISOLATION CULTURE: CPT | Performed by: INTERNAL MEDICINE

## 2023-05-02 PROCEDURE — 88173 CYTOPATH EVAL FNA REPORT: CPT

## 2023-05-02 PROCEDURE — 31624 DX BRONCHOSCOPE/LAVAGE: CPT | Performed by: INTERNAL MEDICINE

## 2023-05-02 PROCEDURE — 88305 TISSUE EXAM BY PATHOLOGIST: CPT

## 2023-05-02 PROCEDURE — 25010000002 ONDANSETRON PER 1 MG: Performed by: NURSE ANESTHETIST, CERTIFIED REGISTERED

## 2023-05-02 PROCEDURE — 80053 COMPREHEN METABOLIC PANEL: CPT | Performed by: INTERNAL MEDICINE

## 2023-05-02 PROCEDURE — 88112 CYTOPATH CELL ENHANCE TECH: CPT

## 2023-05-02 RX ORDER — MEPERIDINE HYDROCHLORIDE 25 MG/ML
12.5 INJECTION INTRAMUSCULAR; INTRAVENOUS; SUBCUTANEOUS
Status: DISCONTINUED | OUTPATIENT
Start: 2023-05-02 | End: 2023-05-02 | Stop reason: HOSPADM

## 2023-05-02 RX ORDER — OXYCODONE HYDROCHLORIDE AND ACETAMINOPHEN 5; 325 MG/1; MG/1
1 TABLET ORAL ONCE AS NEEDED
Status: DISCONTINUED | OUTPATIENT
Start: 2023-05-02 | End: 2023-05-02 | Stop reason: HOSPADM

## 2023-05-02 RX ORDER — FENTANYL CITRATE 50 UG/ML
50 INJECTION, SOLUTION INTRAMUSCULAR; INTRAVENOUS
Status: DISCONTINUED | OUTPATIENT
Start: 2023-05-02 | End: 2023-05-02 | Stop reason: HOSPADM

## 2023-05-02 RX ORDER — SODIUM CHLORIDE 9 MG/ML
INJECTION, SOLUTION INTRAVENOUS AS NEEDED
Status: DISCONTINUED | OUTPATIENT
Start: 2023-05-02 | End: 2023-05-02 | Stop reason: HOSPADM

## 2023-05-02 RX ORDER — LIDOCAINE HYDROCHLORIDE 20 MG/ML
INJECTION, SOLUTION EPIDURAL; INFILTRATION; INTRACAUDAL; PERINEURAL AS NEEDED
Status: DISCONTINUED | OUTPATIENT
Start: 2023-05-02 | End: 2023-05-02 | Stop reason: SURG

## 2023-05-02 RX ORDER — SODIUM CHLORIDE, SODIUM LACTATE, POTASSIUM CHLORIDE, CALCIUM CHLORIDE 600; 310; 30; 20 MG/100ML; MG/100ML; MG/100ML; MG/100ML
100 INJECTION, SOLUTION INTRAVENOUS ONCE AS NEEDED
Status: DISCONTINUED | OUTPATIENT
Start: 2023-05-02 | End: 2023-05-02 | Stop reason: HOSPADM

## 2023-05-02 RX ORDER — LIDOCAINE HYDROCHLORIDE AND EPINEPHRINE 10; 10 MG/ML; UG/ML
INJECTION, SOLUTION INFILTRATION; PERINEURAL AS NEEDED
Status: DISCONTINUED | OUTPATIENT
Start: 2023-05-02 | End: 2023-05-02 | Stop reason: HOSPADM

## 2023-05-02 RX ORDER — SODIUM CHLORIDE, SODIUM LACTATE, POTASSIUM CHLORIDE, CALCIUM CHLORIDE 600; 310; 30; 20 MG/100ML; MG/100ML; MG/100ML; MG/100ML
125 INJECTION, SOLUTION INTRAVENOUS ONCE
Status: COMPLETED | OUTPATIENT
Start: 2023-05-02 | End: 2023-05-02

## 2023-05-02 RX ORDER — IPRATROPIUM BROMIDE AND ALBUTEROL SULFATE 2.5; .5 MG/3ML; MG/3ML
3 SOLUTION RESPIRATORY (INHALATION) ONCE AS NEEDED
Status: COMPLETED | OUTPATIENT
Start: 2023-05-02 | End: 2023-05-02

## 2023-05-02 RX ORDER — SODIUM CHLORIDE 9 MG/ML
40 INJECTION, SOLUTION INTRAVENOUS AS NEEDED
Status: DISCONTINUED | OUTPATIENT
Start: 2023-05-02 | End: 2023-05-02 | Stop reason: HOSPADM

## 2023-05-02 RX ORDER — LIDOCAINE HYDROCHLORIDE 40 MG/ML
3 INJECTION, SOLUTION RETROBULBAR; TOPICAL ONCE
Status: COMPLETED | OUTPATIENT
Start: 2023-05-02 | End: 2023-05-02

## 2023-05-02 RX ORDER — LIDOCAINE HYDROCHLORIDE 10 MG/ML
INJECTION, SOLUTION EPIDURAL; INFILTRATION; INTRACAUDAL; PERINEURAL AS NEEDED
Status: DISCONTINUED | OUTPATIENT
Start: 2023-05-02 | End: 2023-05-02 | Stop reason: HOSPADM

## 2023-05-02 RX ORDER — IPRATROPIUM BROMIDE AND ALBUTEROL SULFATE 2.5; .5 MG/3ML; MG/3ML
3 SOLUTION RESPIRATORY (INHALATION)
Status: DISCONTINUED | OUTPATIENT
Start: 2023-05-02 | End: 2023-05-02 | Stop reason: HOSPADM

## 2023-05-02 RX ORDER — MIDAZOLAM HYDROCHLORIDE 1 MG/ML
0.5 INJECTION INTRAMUSCULAR; INTRAVENOUS
Status: DISCONTINUED | OUTPATIENT
Start: 2023-05-02 | End: 2023-05-02 | Stop reason: HOSPADM

## 2023-05-02 RX ORDER — ALBUTEROL SULFATE 2.5 MG/3ML
2.5 SOLUTION RESPIRATORY (INHALATION) ONCE
Status: COMPLETED | OUTPATIENT
Start: 2023-05-02 | End: 2023-05-02

## 2023-05-02 RX ORDER — SODIUM CHLORIDE 0.9 % (FLUSH) 0.9 %
10 SYRINGE (ML) INJECTION EVERY 12 HOURS SCHEDULED
Status: DISCONTINUED | OUTPATIENT
Start: 2023-05-02 | End: 2023-05-02 | Stop reason: HOSPADM

## 2023-05-02 RX ORDER — SODIUM CHLORIDE 0.9 % (FLUSH) 0.9 %
10 SYRINGE (ML) INJECTION AS NEEDED
Status: DISCONTINUED | OUTPATIENT
Start: 2023-05-02 | End: 2023-05-02 | Stop reason: HOSPADM

## 2023-05-02 RX ORDER — ONDANSETRON 2 MG/ML
4 INJECTION INTRAMUSCULAR; INTRAVENOUS AS NEEDED
Status: DISCONTINUED | OUTPATIENT
Start: 2023-05-02 | End: 2023-05-02 | Stop reason: HOSPADM

## 2023-05-02 RX ORDER — PROPOFOL 10 MG/ML
INJECTION, EMULSION INTRAVENOUS AS NEEDED
Status: DISCONTINUED | OUTPATIENT
Start: 2023-05-02 | End: 2023-05-02 | Stop reason: SURG

## 2023-05-02 RX ADMIN — ALBUTEROL SULFATE 2.5 MG: 0.83 SOLUTION RESPIRATORY (INHALATION) at 08:59

## 2023-05-02 RX ADMIN — PROPOFOL 150 MG: 10 INJECTION, EMULSION INTRAVENOUS at 09:44

## 2023-05-02 RX ADMIN — LIDOCAINE HYDROCHLORIDE 3 ML: 40 INJECTION, SOLUTION RETROBULBAR; TOPICAL at 09:05

## 2023-05-02 RX ADMIN — IPRATROPIUM BROMIDE AND ALBUTEROL SULFATE 3 ML: .5; 2.5 SOLUTION RESPIRATORY (INHALATION) at 10:32

## 2023-05-02 RX ADMIN — LIDOCAINE HYDROCHLORIDE 60 MG: 20 INJECTION, SOLUTION EPIDURAL; INFILTRATION; INTRACAUDAL; PERINEURAL at 09:44

## 2023-05-02 RX ADMIN — SODIUM CHLORIDE, POTASSIUM CHLORIDE, SODIUM LACTATE AND CALCIUM CHLORIDE: 600; 310; 30; 20 INJECTION, SOLUTION INTRAVENOUS at 09:40

## 2023-05-02 RX ADMIN — ONDANSETRON 4 MG: 2 INJECTION INTRAMUSCULAR; INTRAVENOUS at 10:42

## 2023-05-02 NOTE — H&P
Chief Complaint:  Here for a bronchoscopy for abnormal imaging - ct chest    Ct chest reviewed     Subjective     Interval History: no changes since the last time since was seen in office        Review of Systems:    Review of system system is negative for shortness of breath chest pain lightheadedness dizziness any numbness in any area of the body belly pain nausea vomiting diarrhea shortness of breath cough          Vital Signs  Temp:  [97.8 °F (36.6 °C)] 97.8 °F (36.6 °C)  Heart Rate:  [83] 83  Resp:  [16] 16  BP: (149)/(98) 149/98  Body mass index is 36.85 kg/m².  No intake or output data in the 24 hours ending 05/02/23 0812  No intake/output data recorded.    Physical Exam:   General- normal in appearance, not in any acute distress    HEENT- pupils equally reactive to light, normal in size, no scleral icterus    Neck-supple    Chest-respirations normal-on auscultation no wheezing no crackles    S1 and S2 normal    Abdomen-nontender nondistended bowel sounds positive    CNS-nonfocal    Extremities-no edema    Psychiatric-mood good, good eye contact, alert awake oriented     Results Review:     I reviewed the patient's new clinical results.          No results found for: INR, PROTIME        Invalid input(s): PROT, LABALBU      Imaging Results (Last 24 Hours)     ** No results found for the last 24 hours. **               albuterol, 2.5 mg, Nebulization, Once  ipratropium-albuterol, 3 mL, Nebulization, 4x Daily - RT  lactated ringers, 125 mL/hr, Intravenous, Once  Lidocaine HCl (PF), 3 mL, Nebulization, Once  sodium chloride, 10 mL, Intravenous, Q12H            Medication Review:     Assessment & Plan     Abnormal ct chest- all the medications history physical labs are reviewed.  We will do a bronchoscopy with bronchoalveolar lavage his bronchial brushings.  We'll send it for cytology and microbiology.    Informed consent taken.    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

## 2023-05-02 NOTE — ANESTHESIA PROCEDURE NOTES
Airway  Urgency: elective    Date/Time: 5/2/2023 9:45 AM  Airway not difficult    General Information and Staff    Patient location during procedure: OR  CRNA/CAA: Roxie Escalante CRNA    Indications and Patient Condition    Preoxygenated: yes      Final Airway Details  Final airway type: supraglottic airway      Successful airway: unique  Size 4     Number of attempts at approach: 1  Assessment: lips, teeth, and gum same as pre-op and atraumatic intubation

## 2023-05-02 NOTE — ANESTHESIA PREPROCEDURE EVALUATION
Anesthesia Evaluation     Patient summary reviewed and Nursing notes reviewed   no history of anesthetic complications:  NPO Solid Status: > 8 hours  NPO Liquid Status: > 8 hours           Airway   Mallampati: III  TM distance: <3 FB  Neck ROM: full  Difficult intubation highly probable, Small opening and Large neck circumference  Dental    (+) edentulous    Pulmonary    (+) COPD severe, asthma,shortness of breath, decreased breath sounds,     ROS comment: Right Lung Mass  Cardiovascular - normal exam    (+) hypertension, CUELLAR, PVD, hyperlipidemia,       Neuro/Psych- negative ROS  GI/Hepatic/Renal/Endo    (+) obesity,  GERD,  thyroid problem hypothyroidism    Musculoskeletal     Abdominal   (+) obese,    Substance History - negative use     OB/GYN negative ob/gyn ROS         Other   arthritis,                    Anesthesia Plan    ASA 3     general     intravenous induction     Anesthetic plan, risks, benefits, and alternatives have been provided, discussed and informed consent has been obtained with: patient.  Pre-procedure education provided  Use of blood products discussed with patient  Consented to blood products.       CODE STATUS:      FULL    Lab Results   Component Value Date    GLUCOSE 137 (H) 04/20/2023    BUN 18 04/20/2023    CREATININE 0.99 04/20/2023    EGFR 59.6 (L) 04/20/2023    BCR 18.2 04/20/2023    K 3.6 04/20/2023    CO2 24.8 04/20/2023    CALCIUM 9.8 04/20/2023    ALBUMIN 3.7 04/20/2023    BILITOT 0.6 04/20/2023    AST 35 (H) 04/20/2023    ALT 16 04/20/2023       Lab Results   Component Value Date    WBC 5.94 04/20/2023    HGB 13.1 04/20/2023    HCT 40.2 04/20/2023    MCV 81.9 04/20/2023     04/20/2023

## 2023-05-02 NOTE — ANESTHESIA POSTPROCEDURE EVALUATION
Patient: Caprice Rodriguez    Procedure Summary     Date: 05/02/23 Room / Location: [facility] OR [address] [place] [address] COR OR    Anesthesia Start: 0940 Anesthesia Stop: 1019    Procedure: BRONCHOSCOPY WITH ENDOBRONCHIAL ULTRASOUND (Bilateral: Bronchus) Diagnosis:       Lung mass      (Lung mass [R91.8])    Surgeons: Juan Henry MD Provider: Anjel Palacios MD    Anesthesia Type: general ASA Status: 3          Anesthesia Type: general    Vitals  No vitals data found for the desired time range.          Post Anesthesia Care and Evaluation    Patient location during evaluation: PACU  Patient participation: complete - patient participated  Level of consciousness: awake  Pain score: 0  Pain management: adequate    Airway patency: patent  Anesthetic complications: No anesthetic complications  PONV Status: none  Cardiovascular status: acceptable  Respiratory status: acceptable  Hydration status: acceptable

## 2023-05-02 NOTE — OP NOTE
Bronchoscopy Procedure Note    Date of Operation: 5/2/2023    Pre-op Diagnosis: Abnormal CT chest.    Post-op Diagnosis: Abnormal CT chest    Surgeon: Juan Henry MD    Assistants: None    Anesthesia: Please see anesthesia report for details    Operation: Flexible fiberoptic bronchoscopy, diagnostic     Findings: No endobronchial masses or lesions were seen.  On endobronchial ultrasound-station 7 lymph node was enlarged    Specimen: Bronchoalveolar lavage of right middle lobe  Bronchoalveolar lavage of right lower lobe  Bronchial brushings of right middle lobe  Endobronchial ultrasound-guided transbronchial needle aspiration of station 7-was done twice with multiple passes each time.    Estimated Blood Loss: Minimal    Complications: Mucosal bleeding after bronchoalveolar lavage and brushing and after ultrasound-guided transbronchial needle aspiration.    Indications and History:  The patient is a 73 y.o. female with abnormal CT chest and PET scan.  The risks, benefits, complications, treatment options and expected outcomes were discussed with the patient.  The possibilities of reaction to medication, pulmonary aspiration, perforation of a viscus, bleeding, failure to diagnose a condition and creating a complication requiring transfusion or operation were discussed with the patient who freely signed the consent.      Description of Procedure:  The patient was seen in the Holding Room and the site of surgery properly noted/marked.  The patient was taken to endoscopy suite, identified as Caprice Rodriguez and the procedure verified as Flexible Fiberoptic Bronchoscopy.  A Time Out was held and the above information confirmed.     After the induction of topical nasopharyngeal anesthesia, the patient was positioned  and the bronchoscope was passed through the LMA. The vocal cords were visualized and  2 ml 1 % lidocaine was topically placed onto the cords. The cords were normal. The scope was then passed into the trachea.       2 ml 1 % lidocaine was used topically on the angelina.  Careful inspection of the tracheal lumen was accomplished. The scope was sequentially passed into the left main and then left upper and lower bronchi and segmental bronchi.       The scope was then withdrawn and advanced into the right main bronchus and then into the RUL, RML, and RLL bronchi and segmental bronchi.     Station 7 lymph node was enlarged on endobronchial ultrasound.    Samples-  Bronchoscope was wedged into the right middle lobe for a bronchoalveolar lavage and close to 40 cc of saline was given once in close to 16 cc was aspirated back.    Then the bronchoscope was wedged into the right lower lobe for a bronchoalveolar lavage and close to 40 cc of saline was given once in close to 18 cc was aspirated back.    Bronchial brushings of right middle lobe was done once    After bronchoalveolar lavage and bronchial brushings patient started bleeding which was controlled using initially with epinephrine and lidocaine and then thrombin spray.    Then the Endo bronchial ultrasound was introduced mediastinal survey was done.    Endobronchial ultrasound-guided transbronchial needle aspiration of station 7 lymph node was done twice with multiple passes each time.  Patient again had mild bleeding after the needle aspiration which was controlled with local epinephrine and lidocaine.          Samples were sent for cytology and microbiology.  Please follow up the results  The Patient was taken to the Endoscopy Recovery area in satisfactory condition.        Juan Henry MD

## 2023-05-03 LAB
ACID FAST STN SPEC: NEGATIVE
SPECIMEN PREPARATION: NORMAL

## 2023-05-04 LAB
BACTERIA SPEC AEROBE CULT: NORMAL
BACTERIA SPEC AEROBE CULT: NORMAL
BACTERIA SPEC RESP CULT: NORMAL
FUNGUS SPEC CULT: NORMAL
FUNGUS SPEC FUNGUS STN: NORMAL
GRAM STN SPEC: NORMAL
REF LAB TEST METHOD: NORMAL

## 2023-05-09 DIAGNOSIS — R91.1 PULMONARY NODULE: Primary | ICD-10-CM

## 2023-05-09 NOTE — PROGRESS NOTES
Discussed imaging with radiology and will schedule for CT guided needle biopsy of lung nodule. Discussed procedure with patient and she is agreeable with plan.

## 2023-05-12 LAB
FUNGUS SPEC CULT: ABNORMAL
FUNGUS SPEC FUNGUS STN: ABNORMAL
FUNGUS SPEC FUNGUS STN: ABNORMAL

## 2023-05-17 LAB
FUNGUS SPEC CULT ORG #2: ABNORMAL
FUNGUS SPEC CULT: ABNORMAL
FUNGUS SPEC FUNGUS STN: ABNORMAL
FUNGUS SPEC FUNGUS STN: ABNORMAL

## 2023-05-19 ENCOUNTER — HOSPITAL ENCOUNTER (OUTPATIENT)
Dept: CT IMAGING | Facility: HOSPITAL | Age: 73
Discharge: HOME | End: 2023-05-19
Payer: MEDICARE

## 2023-05-19 VITALS
SYSTOLIC BLOOD PRESSURE: 165 MMHG | RESPIRATION RATE: 18 BRPM | WEIGHT: 199 LBS | BODY MASS INDEX: 35.26 KG/M2 | OXYGEN SATURATION: 99 % | DIASTOLIC BLOOD PRESSURE: 86 MMHG | HEIGHT: 63 IN | HEART RATE: 76 BPM

## 2023-05-19 DIAGNOSIS — R91.1 PULMONARY NODULE: ICD-10-CM

## 2023-05-19 LAB
INR PPP: 1 (ref 0.9–1.1)
PLATELET # BLD AUTO: 231 10*3/MM3 (ref 140–450)
PROTHROMBIN TIME: 13.7 SECONDS (ref 12.1–14.7)

## 2023-05-19 PROCEDURE — 87070 CULTURE OTHR SPECIMN AEROBIC: CPT | Performed by: PHYSICIAN ASSISTANT

## 2023-05-19 PROCEDURE — 85610 PROTHROMBIN TIME: CPT | Performed by: RADIOLOGY

## 2023-05-19 PROCEDURE — 85049 AUTOMATED PLATELET COUNT: CPT | Performed by: RADIOLOGY

## 2023-05-19 PROCEDURE — 87176 TISSUE HOMOGENIZATION CULTR: CPT | Performed by: PHYSICIAN ASSISTANT

## 2023-05-19 PROCEDURE — 32408 CORE NDL BX LNG/MED PERQ: CPT | Performed by: RADIOLOGY

## 2023-05-19 PROCEDURE — 87205 SMEAR GRAM STAIN: CPT | Performed by: PHYSICIAN ASSISTANT

## 2023-05-19 RX ORDER — DOXYCYCLINE HYCLATE 100 MG/1
100 CAPSULE ORAL 2 TIMES DAILY
COMMUNITY

## 2023-05-22 DIAGNOSIS — B49 FUNGAL INFECTION: Primary | ICD-10-CM

## 2023-05-22 LAB
BACTERIA SPEC AEROBE CULT: NORMAL
GRAM STN SPEC: NORMAL
GRAM STN SPEC: NORMAL

## 2023-05-22 NOTE — PROGRESS NOTES
Contacted by PCP reporting that patient was short of breath, coughing, and wheezing. PCP did treat her with steroids and antibiotics.     Reviewed recent lab work and patient's bronchoscopy cultures were positive for candida albicans, penicillium species, and cladosporium species. Discussed with Dr. Henry and recommended referral to infectious disease for further recommendations.     Patient also recently had CT guided needle biopsy last week to further investigate right lung mass in setting of abnormal PET/CT scan but we are still awaiting finalized culture and pathology results from this procedure.

## 2023-05-23 LAB
REF LAB TEST METHOD: NORMAL
REF LAB TEST METHOD: NORMAL

## 2023-05-24 ENCOUNTER — TELEPHONE (OUTPATIENT)
Dept: PULMONOLOGY | Facility: CLINIC | Age: 73
End: 2023-05-24
Payer: MEDICARE

## 2023-05-24 NOTE — TELEPHONE ENCOUNTER
Discussed patient's recent pathology results from CT needle biopsy which was negative for dysplasia or malignancy.  Patient previously had bronchoscopy with biopsy in which pathology was also negative for malignancy. Her bronchoscopy fungal cultures did result with various types of fungi including candida albicans, penicillum species, and cladosporium and has been referred to infectious disease for further recommendations.     Discussed findings with Dr. Henry and recommended referral to Dr. Stallworth for consideration of VATS biopsy in setting of abnormal PET/CT scan with hypermetabolism in range of malignancy in several different areas. Patient does have an appointment scheduled on 06/07 with Dr. Stallworth for evaluation of subclavian stenosis. Contacted Dr. Stallworth's office to request that recommendations on further workup for right spiculated lung nodule be discussed as well during her upcoming appointment.     Patient and PCP was informed on updated plan of care and upcoming appointments with different specialties.

## 2023-06-01 ENCOUNTER — OFFICE VISIT (OUTPATIENT)
Dept: INFECTIOUS DISEASES | Facility: CLINIC | Age: 73
End: 2023-06-01

## 2023-06-01 ENCOUNTER — OFFICE VISIT (OUTPATIENT)
Dept: PULMONOLOGY | Facility: CLINIC | Age: 73
End: 2023-06-01

## 2023-06-01 VITALS
HEART RATE: 80 BPM | OXYGEN SATURATION: 97 % | TEMPERATURE: 97 F | BODY MASS INDEX: 35.26 KG/M2 | WEIGHT: 199 LBS | HEIGHT: 63 IN

## 2023-06-01 VITALS
SYSTOLIC BLOOD PRESSURE: 112 MMHG | TEMPERATURE: 97.8 F | HEART RATE: 75 BPM | WEIGHT: 199 LBS | OXYGEN SATURATION: 96 % | BODY MASS INDEX: 35.26 KG/M2 | DIASTOLIC BLOOD PRESSURE: 62 MMHG | HEIGHT: 63 IN

## 2023-06-01 DIAGNOSIS — B49 FUNGAL PNEUMONIA: ICD-10-CM

## 2023-06-01 DIAGNOSIS — J41.0 SIMPLE CHRONIC BRONCHITIS: ICD-10-CM

## 2023-06-01 DIAGNOSIS — E66.9 OBESITY (BMI 30-39.9): ICD-10-CM

## 2023-06-01 DIAGNOSIS — J16.8 FUNGAL PNEUMONIA: ICD-10-CM

## 2023-06-01 DIAGNOSIS — R91.8 MASS OF MIDDLE LOBE OF RIGHT LUNG: Primary | ICD-10-CM

## 2023-06-01 DIAGNOSIS — J96.11 CHRONIC RESPIRATORY FAILURE WITH HYPOXIA: ICD-10-CM

## 2023-06-01 DIAGNOSIS — Z87.891 FORMER SMOKER: ICD-10-CM

## 2023-06-01 DIAGNOSIS — J41.0 SIMPLE CHRONIC BRONCHITIS: Primary | ICD-10-CM

## 2023-06-01 LAB
CRP SERPL-MCNC: 0.74 MG/DL (ref 0–0.5)
PROCALCITONIN SERPL-MCNC: 0.06 NG/ML (ref 0–0.25)

## 2023-06-01 PROCEDURE — 86612 BLASTOMYCES ANTIBODY: CPT | Performed by: INTERNAL MEDICINE

## 2023-06-01 PROCEDURE — 86606 ASPERGILLUS ANTIBODY: CPT | Performed by: INTERNAL MEDICINE

## 2023-06-01 PROCEDURE — 84145 PROCALCITONIN (PCT): CPT | Performed by: INTERNAL MEDICINE

## 2023-06-01 PROCEDURE — 86140 C-REACTIVE PROTEIN: CPT | Performed by: INTERNAL MEDICINE

## 2023-06-01 PROCEDURE — 87305 ASPERGILLUS AG IA: CPT | Performed by: INTERNAL MEDICINE

## 2023-06-01 PROCEDURE — 87449 NOS EACH ORGANISM AG IA: CPT | Performed by: INTERNAL MEDICINE

## 2023-06-01 NOTE — PROGRESS NOTES
Subjective      Chief Complaint  Mass of middle lobe of right lung    Subjective      History of Present Illness  Caprice Rodriguez is a 73 y.o. female who presents today to [facility] with past medical history of essential hypertension, hyperlipidemia, hypothyroidism, and COPD who presents today for Mass of middle lobe of right lung. This visit is a follow up appointment.     Mass of middle lobe of right lung:  Patient previously had PET/CT scan that was concerning for underlying malignancy with metastasis to bone and possible pathologic fracture. She has underwent multiple biopsies including a bronchoscopy with biopsy and CT needle guided biopsy which were both negative for malignancy. The bronchoscopy fungal cultures were positive for multiple different fungal species including Candida albicans, penicillium, and cladosporium. She has been referred to infectious disease for further evaluation and recommendations on treatments. She does have an upcoming appointment with CT surgery to discuss consideration of VATS biopsy for right lung nodule.     Patient reports that her respiratory symptoms are currently at baseline. She continues to use her Symbicort inhaler 2 puffs twice daily. She does use an albuterol inhaler as needed. She is compliant with using her supplemental oxygen as needed.       Current Outpatient Medications:   •  albuterol (ACCUNEB) 0.63 MG/3ML nebulizer solution, USE 1 VIAL IN NEBULIZER EVERY 6 HOURS, Disp: , Rfl:   •  amitriptyline (ELAVIL) 25 MG tablet, Take 1 tablet by mouth Daily., Disp: , Rfl:   •  atenolol (TENORMIN) 25 MG tablet, Take 1 tablet by mouth Daily., Disp: , Rfl:   •  atorvastatin (LIPITOR) 20 MG tablet, Take 1 tablet by mouth Daily., Disp: , Rfl:   •  Budesonide-Formoterol Fumarate (SYMBICORT IN), Inhale 2 puffs 2 (two) times a day as needed., Disp: , Rfl:   •  Calcium Carbonate-Vitamin D (CALCIUM 600+D3 PO), Take  by mouth.,  "Disp: , Rfl:   •  celecoxib (CeleBREX) 200 MG capsule, TAKE ONE CAPSULE BY MOUTH ONCE DAILY FOR ARTHRITIS, Disp: , Rfl:   •  Cholecalciferol (Vitamin D3) 1.25 MG (06102 UT) capsule, Take 1 capsule by mouth 1 (One) Time Per Week., Disp: , Rfl:   •  ibuprofen (ADVIL,MOTRIN) 600 MG tablet, , Disp: , Rfl:   •  levothyroxine (SYNTHROID, LEVOTHROID) 112 MCG tablet, Take 1 tablet by mouth Daily., Disp: , Rfl:   •  lisinopril (PRINIVIL,ZESTRIL) 10 MG tablet, Take 1 tablet by mouth Every Night., Disp: , Rfl:   •  lisinopril-hydrochlorothiazide (PRINZIDE,ZESTORETIC) 10-12.5 MG per tablet, Take 1 tablet by mouth Every Morning., Disp: , Rfl:   •  meloxicam (MOBIC) 7.5 MG tablet, Take 1 tablet by mouth Daily., Disp: , Rfl:   •  methocarbamol (ROBAXIN) 500 MG tablet, Take 1 tablet by mouth 2 (Two) Times a Day., Disp: , Rfl:   •  nitroglycerin (NITROSTAT) 0.4 MG SL tablet, , Disp: , Rfl:   •  omeprazole (PriLOSEC) 20 MG capsule, Take 1 capsule by mouth 2 (Two) Times a Day., Disp: , Rfl:   •  potassium chloride (K-DUR) 10 MEQ CR tablet, Take 1 tablet by mouth Daily., Disp: , Rfl:   •  tolterodine LA (DETROL LA) 4 MG 24 hr capsule, Take 1 capsule by mouth Daily., Disp: , Rfl:   •  topiramate (TOPAMAX) 50 MG tablet, Take 1 tablet by mouth Daily., Disp: , Rfl:   •  traMADol (ULTRAM) 50 MG tablet, Take 1 tablet by mouth Every 8 (Eight) Hours As Needed., Disp: , Rfl:   •  vitamin B-12 (CYANOCOBALAMIN) 1000 MCG tablet, Take 1 tablet by mouth Daily., Disp: , Rfl:       No Known Allergies    Objective     Objective   Vital Signs:  /62 (BP Location: Left arm, Patient Position: Sitting)   Pulse 75   Temp 97.8 °F (36.6 °C)   Ht 160 cm (63\")   Wt 90.3 kg (199 lb)   SpO2 96%   BMI 35.25 kg/m²   Estimated body mass index is 35.25 kg/m² as calculated from the following:    Height as of this encounter: 160 cm (63\").    Weight as of this encounter: 90.3 kg (199 lb).    Past Medical History:   Diagnosis Date   • Arthritis    • Chest pain "    • COPD (chronic obstructive pulmonary disease)    • Elevated cholesterol    • GERD (gastroesophageal reflux disease)    • Hyperlipidemia    • Hypertension    • Hypokalemia    • Hypothyroidism    • Pericardial effusion, trivial      Past Surgical History:   Procedure Laterality Date   • APPENDECTOMY     • BRONCHOSCOPY Bilateral 2023    Procedure: BRONCHOSCOPY WITH ENDOBRONCHIAL ULTRASOUND;  Surgeon: Juan Henry MD;  Location: [facility];  Service: Pulmonary;  Laterality: Bilateral;   • CARDIAC CATHETERIZATION     • CARDIOVASCULAR STRESS TEST  2014   • CATARACT EXTRACTION, BILATERAL Bilateral      right eye, Dr Hauser  and  2017 left eye Dr Meyer   • COLONOSCOPY     • ECHO - CONVERTED  2014   • ENDOSCOPY     • GALLBLADDER SURGERY     • HERNIA REPAIR     • TUBAL ABDOMINAL LIGATION       Social History     Socioeconomic History   • Marital status:    Tobacco Use   • Smoking status: Former     Packs/day: 2.00     Years: 20.00     Pack years: 40.00     Types: Cigarettes     Quit date:      Years since quittin.4     Passive exposure: Past   • Smokeless tobacco: Never   Vaping Use   • Vaping Use: Never used   Substance and Sexual Activity   • Alcohol use: Not Currently     Alcohol/week: 1.0 standard drink     Types: 1 Cans of beer per week     Comment: occasional fire ball for sickness   • Drug use: No   • Sexual activity: Defer      Physical Exam  Constitutional:       General: She is awake.      Appearance: Normal appearance. She is obese.   HENT:      Head: Normocephalic and atraumatic.      Nose: Nose normal.      Mouth/Throat:      Mouth: Mucous membranes are moist.      Pharynx: Oropharynx is clear.   Eyes:      Extraocular Movements: Extraocular movements intact.      Conjunctiva/sclera: Conjunctivae normal.      Pupils: Pupils are equal, round, and reactive to light.   Cardiovascular:      Rate and Rhythm: Normal rate and regular rhythm.      Pulses: Normal pulses.       Heart sounds: Normal heart sounds. No murmur heard.    No friction rub. No gallop.   Pulmonary:      Effort: Pulmonary effort is normal. No respiratory distress.      Breath sounds: Normal breath sounds. No wheezing, rhonchi or rales.      Comments: Currently tolerating room air.   Musculoskeletal:         General: Normal range of motion.      Cervical back: Normal range of motion.   Skin:     General: Skin is warm and dry.   Neurological:      General: No focal deficit present.      Mental Status: She is alert and oriented to person, place, and time. Mental status is at baseline.   Psychiatric:         Mood and Affect: Mood normal.         Behavior: Behavior normal. Behavior is cooperative.         Thought Content: Thought content normal.        Result Review :  The following labs and radiology results have been reviewed.    Lab Review:   No results found for: FEV1, FVC, QTV8LUG, TLC, DLCO  Hospital Outpatient Visit on 2023   Component Date Value Ref Range Status   • Protime 2023 13.7  12.1 - 14.7 Seconds Final   • INR 2023 1.00  0.90 - 1.10 Final   • Platelets 2023 231  140 - 450 10*3/mm3 Final   • Reference Lab Report 2023    Final                    Value:[facility]  [address] [place] [address]  Phone: [phone] or [phone]  Fax: [phone]  Houston Kerr M.D., Medical Director    PATIENT NAME                           LABORATORY NO.  [id number]  SHERI LANDRY                         [id number]  [id number]                         AGE              SEX  SSN           CLIENT REF #  [facility] [place]              73      1950  F    [id number]   [id number]    [address]                     REQUESTING M.D.     ATTENDING MDAVE.     COPY TO.  ABIGAIL STILES [id number]                   MIMI MORAN  DATE COLLECTED      DATE RECEIVED      DATE REPORTED  2023    DIAGNOSIS:  LUNG, BIOPSY,  RIGHT, NEEDLE CORE BIOPSIES:  Benign alveolated lung parenchyma with hemorrhage, focal fibrosis, and dust  pigment  Negative for dysplasia or malignancy    ISATU    CLINICAL HISTORY:  Pulmonary nodule, right lung    SPECIMENS RECEIVED:  LUNG, BIOPSY,                           RIGHT    MICROSCOPIC DESCRIPTION:  Tissue blocks are prepared and slides are examined microscopically on all  specimens. See diagnosis for details.    Professional interpretation rendered by Ricardo Wynne M.D., CARMENCITA at [facility], [facility], [address] [place] [address].    GROSS DESCRIPTION:  Labeled right lung are 2 core biopsies of tan soft tissue ranging in length from  1.0 to 1.5 cm and each measuring 0.1 cm in diameter, submitted entirely in 2  blocks.  MTH    REVIEWED, DIAGNOSED AND ELECTRONICALLY  SIGNED BY:    Ricardo Wynne M.D., CARMENCITA  CPT CODES:  [id number]     • Reference Lab Report 2023    Final                    Value:[facility]  [address] [place] [address]  Phone: [phone] or [phone]  Fax: [phone]  Houston Kerr M.D., Medical Director    PATIENT NAME                                 LABORATORY NO.  786   SHERI LANDRY                               [id number]  [id number]                                     AGE                SEX     N            CLIENT REF #  [facility] [place]                          73       1950   F       [id number]    [id number]  [address]                                 REQUESTING M.D.       ATTENDING [place] [address]                               MIMI MORAN  DATE COLLECTED        DATE RECEIVED          DATE REPORTED  2023    DIAGNOSIS:  LUNG, FNA, RIGHT:  Negative for malignant cells.    MICROSCOPIC DESCRIPTION:  Rare benign ciliated respiratory epithelial cells and pulmonary macrophages.    Professional interpretation                            rendered by Ricardo Wynne M.D., CARMENCITA at [facility], [facility], [address] [place] [address].    CLINICAL HISTORY:  Pulmonary nodule    SPECIMENS SUBMITTED:  LUNG, FNA, RIGHT    GROSS SPECIMEN DESCRIPTION:  30cc of cloudy dark pink fluid with sediment in fixative    REVIEWED, DIAGNOSED AND ELECTRONICALLY  SIGNED BY:    Ricardo Wynne M.D., CARMENCITA  CPT CODES:  [id number]     • Tissue Culture 05/19/2023 No growth at 3 days   Final   • Gram Stain 05/19/2023 Rare (1+) WBCs seen   Final   • Gram Stain 05/19/2023 No organisms seen   Final   Admission on 05/02/2023, Discharged on 05/02/2023   Component Date Value Ref Range Status   • WBC 05/02/2023 7.23  3.40 - 10.80 10*3/mm3 Final   • RBC 05/02/2023 4.84  3.77 - 5.28 10*6/mm3 Final   • Hemoglobin 05/02/2023 13.1  12.0 - 15.9 g/dL Final   • Hematocrit 05/02/2023 40.8  34.0 - 46.6 % Final   • MCV 05/02/2023 84.3  79.0 - 97.0 fL Final   • MCH 05/02/2023 27.1  26.6 - 33.0 pg Final   • MCHC 05/02/2023 32.1  31.5 - 35.7 g/dL Final   • RDW 05/02/2023 15.3  12.3 - 15.4 % Final   • RDW-SD 05/02/2023 46.7  37.0 - 54.0 fl Final   • MPV 05/02/2023 9.1  6.0 - 12.0 fL Final   • Platelets 05/02/2023 211  140 - 450 10*3/mm3 Final   • Glucose 05/02/2023 127 (H)  65 - 99 mg/dL Final   • BUN 05/02/2023 11  8 - 23 mg/dL Final   • Creatinine 05/02/2023 0.73  0.57 - 1.00 mg/dL Final   • Sodium 05/02/2023 143  136 - 145 mmol/L Final   • Potassium 05/02/2023 3.3 (L)  3.5 - 5.2 mmol/L Final   • Chloride 05/02/2023 105  98 - 107 mmol/L Final   • CO2 05/02/2023 30.2 (H)  22.0 - 29.0 mmol/L Final   • Calcium 05/02/2023 10.1  8.6 - 10.5 mg/dL Final   • Total Protein 05/02/2023 7.1  6.0 - 8.5 g/dL Final   • Albumin 05/02/2023 3.6  3.5 - 5.2 g/dL Final   • ALT (SGPT) 05/02/2023 11  1 - 33 U/L Final   • AST (SGOT) 05/02/2023 23  1 - 32 U/L Final   • Alkaline Phosphatase 05/02/2023 83  39 - 117 U/L Final   • Total Bilirubin 05/02/2023 0.7  0.0 - 1.2 mg/dL Final   • Globulin 05/02/2023 3.5   gm/dL Final   • A/G Ratio 2023 1.0  g/dL Final   • BUN/Creatinine Ratio 2023 15.1  7.0 - 25.0 Final   • Anion Gap 2023 7.8  5.0 - 15.0 mmol/L Final   • eGFR 2023 85.9  >60.0 mL/min/1.73 Final   • Protime 2023 14.2  12.1 - 14.7 Seconds Final   • INR 2023 1.05  0.90 - 1.10 Final   • Fungus Stain 2023 Final report   Preliminary   • KOH/Calcofluor preparation 2023 Comment   Preliminary    KOH/Calcofluor preparation:  no fungus observed.   • Culture 2023 Preliminary report (A)   Preliminary   • Fungus (Mycology) Result 1 2023 Cladosporium species (A)   Preliminary   • AFB Specimen Processing 2023 Concentration   Preliminary   • Acid Fast Smear 2023 Negative   Preliminary   • BAL Culture 2023 <10,000 CFU/mL Normal respiratory sarahy. No S. aureus or Pseudomonas aeruginosa detected. Final report.   Final   • Gram Stain 2023 WBCs seen   Final   • Gram Stain 2023 Gram positive cocci in pairs   Final   • Reference Lab Report 2023    Final                    Value:[facility]  [address] [place] [address]  Phone: [phone] or [phone]  Fax: [phone]  Houston Kerr M.D., Medical Director    PATIENT NAME                                 LABORATORY NO.  SHERI TRUJILLO                               [id number]  [id number]                                     AGE                SEX     SSN            CLIENT REF #  [facility] [place]                          73       1950   F       [id number]    [id number]  [address]                                 REQUESTING M.D.       ATTENDING M.D..        [place] [address]                               DONNA DENIS  DATE COLLECTED        DATE RECEIVED          DATE REPORTED  2023    DIAGNOSIS:  A.      BRONCHIAL LAVAGE, RIGHT MIDDLE LOBE:  Negative for malignant  cells.  B.      BRONCHIAL LAVAGE, RIGHT LOWER LOBE:  Negative for malignant cells.  C.      BRONCH WASH:  Negative for                           malignant cells.  D.      BRONCH BRUSH, RIGHT MIDDLE LOBE:  Negative for malignant cells.  E.      LYMPH NODE, FNA, STATION 7:  Negative for malignant cells.    MICROSCOPIC DESCRIPTION:  A.     Ciliated respiratory epithelial cells and pulmonary macrophages.  B.     Ciliated respiratory epithelial cells and pulmonary macrophages.  C.     Ciliated respiratory epithelial cells and pulmonary macrophages.  D.     Ciliated respiratory epithelial cells and pulmonary macrophages.  E.     The specimen shows a polymorphic lymphoid population.  The findings  are most consistent with a benign reactive lymphadenopathy; however, if  the node is large, continues to increase in size or fails to respond to  antibiotics, excision to definitely exclude a low grade lymphoma or  Hodgkin's lymphoma is recommended.  Unfixed tissue should be  submitted in transport media for immunophenotyping by flow cytometry.    Professional interpretation rendered by Ricardo Wynne M.D., CARMENCITA at [facility], [facility], [address] [place] [address].    CLINICAL HISTORY:  Lung mass    SPECIMENS SUBMITTED:  A.    BRONCHIAL LAVAGE, RIGHT MIDDLE LOBE  B.    BRONCHIAL LAVAGE, RIGHT LOWER LOBE  C.    BRONCH WASH  D.    BRONCH BRUSH, RIGHT MIDDLE LOBE  E.    LYMPH NODE, FNA, STATION 7    GROSS SPECIMEN DESCRIPTION:  A.     35cc of clear colorless fluid with scant sediment in fixative  B.     35cc of clear colorless fluid with scant sediment in fixative  C.     40cc of dark red fluid with visible sediment in fixative  Cell block has been examined.  D.     1 premade smear in alcohol with brush present  E.     45cc of dark red fluid with visible sediment in fixative  Cell block has been examined.    REVIEWED, DIAGNOSED AND ELECTRONICALLY  SIGNED BY:    Ricardo Wynne M.D., [email or web address].  CPT  CODES:  88112x4, 88305x2, [id number]     • Fungus Stain 05/02/2023 Final report   Preliminary   • KOH/Calcofluor preparation 05/02/2023 Comment   Preliminary    KOH/Calcofluor preparation:  no fungus observed.   • Culture 05/02/2023 Preliminary report (A)   Preliminary   • Fungus (Mycology) Result 1 05/02/2023 Candida albicans (A)   Preliminary   • Fungus (Mycology) Result 2 05/02/2023 Penicillium species (A)   Preliminary   • AFB Specimen Processing 05/02/2023 Concentration   Preliminary   • Acid Fast Smear 05/02/2023 Negative   Preliminary   • BAL Culture 05/02/2023 <10,000 CFU/mL Normal respiratory sarahy. No S. aureus or Pseudomonas aeruginosa detected. Final report.   Final   • Gram Stain 05/02/2023 WBCs seen   Final   • Gram Stain 05/02/2023 Gram positive cocci in pairs   Final   • Fungus Stain 05/02/2023 Final report   Preliminary   • KOH/Calcofluor preparation 05/02/2023 Comment   Preliminary    KOH/Calcofluor preparation:  no fungus observed.   • Culture 05/02/2023 Preliminary report (A)   Preliminary   • Fungus (Mycology) Result 1 05/02/2023 Candida albicans (A)   Preliminary   • AFB Specimen Processing 05/02/2023 Concentration   Preliminary   • Acid Fast Smear 05/02/2023 Negative   Preliminary   • Respiratory Culture 05/02/2023 Light growth (2+) Normal respiratory sarahy. No S. aureus or Pseudomonas aeruginosa detected. Final report.   Final   • Gram Stain 05/02/2023 Moderate (3+) WBCs seen   Final   • Gram Stain 05/02/2023 Rare (1+) Gram positive cocci in clusters   Final   Admission on 04/20/2023, Discharged on 04/20/2023   Component Date Value Ref Range Status   • QT Interval 04/20/2023 352  ms Final   • QTC Interval 04/20/2023 435  ms Final   • Glucose 04/20/2023 137 (H)  65 - 99 mg/dL Final   • BUN 04/20/2023 18  8 - 23 mg/dL Final   • Creatinine 04/20/2023 0.99  0.57 - 1.00 mg/dL Final   • Sodium 04/20/2023 138  136 - 145 mmol/L Final   • Potassium 04/20/2023 3.6  3.5 - 5.2 mmol/L Final   • Chloride  04/20/2023 102  98 - 107 mmol/L Final   • CO2 04/20/2023 24.8  22.0 - 29.0 mmol/L Final   • Calcium 04/20/2023 9.8  8.6 - 10.5 mg/dL Final   • Total Protein 04/20/2023 7.1  6.0 - 8.5 g/dL Final   • Albumin 04/20/2023 3.7  3.5 - 5.2 g/dL Final   • ALT (SGPT) 04/20/2023 16  1 - 33 U/L Final   • AST (SGOT) 04/20/2023 35 (H)  1 - 32 U/L Final   • Alkaline Phosphatase 04/20/2023 79  39 - 117 U/L Final   • Total Bilirubin 04/20/2023 0.6  0.0 - 1.2 mg/dL Final   • Globulin 04/20/2023 3.4  gm/dL Final   • A/G Ratio 04/20/2023 1.1  g/dL Final   • BUN/Creatinine Ratio 04/20/2023 18.2  7.0 - 25.0 Final   • Anion Gap 04/20/2023 11.2  5.0 - 15.0 mmol/L Final   • eGFR 04/20/2023 59.6 (L)  >60.0 mL/min/1.73 Final   • proBNP 04/20/2023 244.6  0.0 - 1,800.0 pg/mL Final   • Extra Tube 04/20/2023 Hold for add-ons.   Final    Auto resulted.   • Extra Tube 04/20/2023 hold for add-on   Final    Auto resulted   • Extra Tube 04/20/2023 Hold for add-ons.   Final    Auto resulted.   • Extra Tube 04/20/2023 Hold for add-ons.   Final    Auto resulted   • WBC 04/20/2023 5.94  3.40 - 10.80 10*3/mm3 Final   • RBC 04/20/2023 4.91  3.77 - 5.28 10*6/mm3 Final   • Hemoglobin 04/20/2023 13.1  12.0 - 15.9 g/dL Final   • Hematocrit 04/20/2023 40.2  34.0 - 46.6 % Final   • MCV 04/20/2023 81.9  79.0 - 97.0 fL Final   • MCH 04/20/2023 26.7  26.6 - 33.0 pg Final   • MCHC 04/20/2023 32.6  31.5 - 35.7 g/dL Final   • RDW 04/20/2023 15.1  12.3 - 15.4 % Final   • RDW-SD 04/20/2023 45.1  37.0 - 54.0 fl Final   • MPV 04/20/2023 9.7  6.0 - 12.0 fL Final   • Platelets 04/20/2023 174  140 - 450 10*3/mm3 Final   • Neutrophil % 04/20/2023 66.2  42.7 - 76.0 % Final   • Lymphocyte % 04/20/2023 13.5 (L)  19.6 - 45.3 % Final   • Monocyte % 04/20/2023 16.2 (H)  5.0 - 12.0 % Final   • Eosinophil % 04/20/2023 2.9  0.3 - 6.2 % Final   • Basophil % 04/20/2023 0.5  0.0 - 1.5 % Final   • Immature Grans % 04/20/2023 0.7 (H)  0.0 - 0.5 % Final   • Neutrophils, Absolute 04/20/2023  3.94  1.70 - 7.00 10*3/mm3 Final   • Lymphocytes, Absolute 04/20/2023 0.80  0.70 - 3.10 10*3/mm3 Final   • Monocytes, Absolute 04/20/2023 0.96 (H)  0.10 - 0.90 10*3/mm3 Final   • Eosinophils, Absolute 04/20/2023 0.17  0.00 - 0.40 10*3/mm3 Final   • Basophils, Absolute 04/20/2023 0.03  0.00 - 0.20 10*3/mm3 Final   • Immature Grans, Absolute 04/20/2023 0.04  0.00 - 0.05 10*3/mm3 Final   • nRBC 04/20/2023 0.0  0.0 - 0.2 /100 WBC Final   • HS Troponin T 04/20/2023 17 (H)  <10 ng/L Final   • HS Troponin T 04/20/2023 15 (H)  <10 ng/L Final   • Troponin T Delta 04/20/2023 -2  >=-4 - <+4 ng/L Final      Reviewed previous PFT from 12/2021      Reviewed previous PET/CT scan from 04/11/2023    Narrative & Impression   EXAM:    PET/CT Skull Base to Mid Thigh     EXAM DATE:    4/11/2023 10:13 AM     CLINICAL HISTORY:    r91.8; R91.8-Other nonspecific abnormal finding of lung field     TECHNIQUE:    Axial Positron Emission Tomography / Computed Tomography images were  obtained from skull base to mid thigh following the intravenous  administration of F-18 FDG. MIP reconstructed images were reviewed.     COMPARISON:    No relevant prior studies available.     FINDINGS:      HEAD:    Brain:  Unremarkable as visualized.    Nasopharynx:  Unremarkable.    Dental:  Extensive FDG hypermetabolism localizing to the anterior  maxilla which could BE due to inflammation in the setting of dental  disease or could represent FDG hypermetabolic bone metastasis. Maximum  SUV: 10.2.      NECK:    Hypopharynx:  Unremarkable.    Larynx:  Unremarkable.    Trachea:  Unremarkable.    Retropharyngeal space:  Unremarkable.    Submandibular/parotid glands:  Unremarkable.  Glands are normal in  size.    Thyroid:  Unremarkable.  No enlarged or calcified nodules.      CHEST:    Lungs:  Right middle lobe pulmonary nodule is 26.6 x 24.2 mm and  appears to attach to the major fissure as well as the pleural at the  periphery and demonstrates FDG hypermetabolism  in the range of  malignancy. Maximum SUV: 13.8.    Pleural space:  Unremarkable.  No significant effusion.  No  pneumothorax.    Heart:  Cardiomegaly with moderate coronary artery calcifications.  No  significant pericardial effusion.    Mediastinum:  Unremarkable.  No mass.      ABDOMEN:    Liver:  Fatty liver.    Gallbladder and bile ducts:  Cholecystectomy.  No ductal dilation.    Pancreas:  Unremarkable.  No ductal dilation.    Spleen:  Unremarkable.  No splenomegaly.    Adrenals:  Unremarkable.  No mass.    Kidneys and ureters:  Unremarkable.    Stomach and bowel:  Diverticulosis without diverticulitis.  No  obstruction.      PELVIS:    Appendix:  No findings to suggest acute appendicitis.    Bladder:  Unremarkable.    Reproductive:  Unremarkable as visualized.      WHOLE BODY:    Intraperitoneal space:  Unremarkable.  No significant fluid  collection.  No free air.    Bones/joints:  FDG hypermetabolism in association of a right lateral  fifth rib lesion with pathologic fracture with maximum SUV: 8.6.  FDG  hypermetabolism in association with lytic bone lesion of the right  posterior seventh rib with maximum SUV: 9.8.  FDG hypermetabolism in  association with lytic bone lesion of the right iliac wing with maximum  SUV: 8.7.  No dislocation.    Soft tissues:  Unremarkable.    Vasculature:  Atherosclerosis thoracic and abdominal aorta.  No aortic  aneurysm.    Lymph nodes:  FDG hypermetabolism localizing to the right hilum most  consistent with hypermetabolic adenopathy with maximum SUV: 6.6.   Nonenlarged upper cervical lymph nodes show low-grade FDG  hypermetabolism that approach the range of malignancy but are  predominantly in the range of inflammation with maximum SUV: 3.4, 3.2.  Right cervical lymph node is 1.1 cm. Left cervical or facial lymph node  is 0.7 cm.     IMPRESSION:  1.  Right middle lobe pulmonary nodule is 26.6 x 24.2 mm and appears to  attach to the major fissure as well as the pleura at the  periphery and  demonstrates FDG hypermetabolism in the range of malignancy. Maximum  SUV: 13.8.  2.  FDG hypermetabolism localizing to the right hilum most consistent  with hypermetabolic adenopathy with maximum SUV: 6.6.  3.  Extensive FDG hypermetabolism localizing to the anterior maxilla  which could BE due to inflammation in the setting of dental disease or  could represent FDG hypermetabolic bone metastasis. Maximum SUV: 10.2.  4.  FDG hypermetabolism in association of a right lateral fifth rib  lesion with pathologic fracture with maximum SUV: 8.6.  5.  FDG hypermetabolism in association with lytic bone lesion of the  right posterior seventh rib with maximum SUV: 9.8.  6.  FDG hypermetabolism in association with lytic bone lesion of the  right iliac wing with maximum SUV: 8.7.  7.  Nonenlarged upper cervical lymph nodes show low-grade FDG  hypermetabolism that approach the range of malignancy but are  predominantly in the range of inflammation with maximum SUV: 3.4, 3.2.  Right cervical lymph node is 1.1 cm. Left cervical or facial lymph node  is 0.7 cm.  8. Other incidental and nonacute findings as above.     This report was finalized on 4/11/2023 11:53 AM by Dr. Maico Mancera MD.          Reviewed bronchoscopy cultures from 05/02/2023      Reviewed previous pathology results from 05/02/2023 and 05/19/2023            Assessment / Plan         Assessment   Diagnoses and all orders for this visit:    1. Mass of middle lobe of right lung (Primary)  2. Former smoker  · Previous PET/CT imaging reviewed and noted above concerning for malignancy with metastasis.   · Has underwent 2 biopsies including bronchoscopy with biopsy and CT needle guided biopsy and both pathology reports noted above were negative for malignancy but concerns for inadequate sample.   · Discussed case with Dr. Henry, recommended to refer to CT surgery for further recommendations and possible consideration of VATS biopsy and has appointment  scheduled on 06/07/2023.   · Previous bronchoscopy fungal cultures were positive for multiple different fungi including penicillium, candida albicans, and cladosporium. Referred to infectious disease for further recommendations and is performing additional lab work to investigate if true fungal infection or contaminant.     3. Simple chronic bronchitis  · Continue albuterol inhaler 2 puffs every 4-6 hours as needed.   · Continue Symbicort inhaler 2 puffs twice daily.     4. Chronic respiratory failure with hypoxia  5. Obesity (BMI 30-39.9)  · Compliant with supplemental oxygen use at night, with exertion, and as needed.   · Tolerating room air during today's visit.     I spent 40 minutes caring for Caprice on this date of service. This time includes time spent by me in the following activities:preparing for the visit, reviewing tests, obtaining and/or reviewing a separately obtained history, performing a medically appropriate examination and/or evaluation , counseling and educating the patient/family/caregiver, ordering medications, tests, or procedures, documenting information in the medical record and independently interpreting results and communicating that information with the patient/family/caregiver.    Follow Up   Return in about 12 days (around 6/13/2023), or if symptoms worsen or fail to improve, for Next scheduled follow up.    Patient was given instructions and counseling regarding her condition or for health maintenance advice. Please see specific information pulled into the AVS if appropriate.       This document has been electronically signed by Joanna Peralta PA-C   June 1, 2023 15:28 EDT    Dictated Utilizing Dragon Dictation: Part of this note may be an electronic transcription/translation of spoken language to printed text using the Dragon Dictation System.

## 2023-06-01 NOTE — PROGRESS NOTES
[facility]         Referring Provider: Joanna Peralta PA-C  [address]  [address]  [place] [address]    Subjective      Chief Complaint  Nail Problem    History of Present Illness  Caprice Rodriguez is a 73 y.o. female who presents today to [facility] for Initial Consultation  . Past medical history is significant for COPD and recent diagnosis of lung nodule consistent with malignancy. CT guided biopsy revealed no fungal elements or evidence of infectious process but BAL showed growth of Candida Albicans on one specimen, Penicillium in another specimen and Cladosporium in another specimen.    Patient denies any fever, chills, night sweats or productive cough.    Past Medical History:   Diagnosis Date   • Arthritis    • Chest pain    • COPD (chronic obstructive pulmonary disease)    • Elevated cholesterol    • GERD (gastroesophageal reflux disease)    • Hyperlipidemia    • Hypertension    • Hypokalemia    • Hypothyroidism    • Pericardial effusion, trivial        Past Surgical History:   Procedure Laterality Date   • APPENDECTOMY     • BRONCHOSCOPY Bilateral 2023    Procedure: BRONCHOSCOPY WITH ENDOBRONCHIAL ULTRASOUND;  Surgeon: Juan Henry MD;  Location: [facility];  Service: Pulmonary;  Laterality: Bilateral;   • CARDIAC CATHETERIZATION     • CARDIOVASCULAR STRESS TEST  2014   • CATARACT EXTRACTION, BILATERAL Bilateral      right eye, Dr Hauser  and  2017 left eye Dr Meyer   • COLONOSCOPY     • ECHO - CONVERTED  2014   • ENDOSCOPY     • GALLBLADDER SURGERY     • HERNIA REPAIR     • TUBAL ABDOMINAL LIGATION         Social History     Socioeconomic History   • Marital status:    Tobacco Use   • Smoking status: Former     Packs/day: 2.00     Years: 20.00     Pack years: 40.00     Types: Cigarettes     Quit date:      Years since quittin.4   • Smokeless tobacco: Never   Vaping Use   • Vaping Use: Never used   Substance  and Sexual Activity   • Alcohol use: Not Currently     Alcohol/week: 1.0 standard drink     Types: 1 Cans of beer per week     Comment: occasional fire ball for sickness   • Drug use: No   • Sexual activity: Defer       Family History  family history includes Cancer in her brother, brother, sister, sister, and sister; Cirrhosis in her father; Diabetes in her mother; Heart attack in her mother; Heart disease in her brother and mother; Heart failure in her brother and sister.    Immunization History   Administered Date(s) Administered   • COVID-19 (MODERNA) 1st,2nd,3rd Dose Monovalent 03/03/2021, 03/03/2021, 03/31/2021, 03/31/2021, 12/10/2021   • COVID-19 (UNSPECIFIED) 03/03/2021, 03/31/2021, 12/10/2021   • Flu Vaccine Quad PF >36MO 09/14/2017   • Fluzone High Dose =>65 Years ([employer] ONLY) 10/04/2013, 10/05/2015, 09/19/2018, 11/10/2021, 11/16/2022   • Hepatitis A 06/13/2019   • Influenza LAIV (Nasal) 10/05/2020   • Influenza Quad Vaccine (Inpatient) 09/20/2010   • Pneumococcal Conjugate 13-Valent (PCV13) 09/29/2016, 09/19/2018   • Tdap 10/27/2017, 10/27/2017        Allergies  No Known Allergies    The medication list has been reviewed and updated.   Current Medications    Current Outpatient Medications:   •  albuterol (ACCUNEB) 0.63 MG/3ML nebulizer solution, USE 1 VIAL IN NEBULIZER EVERY 6 HOURS, Disp: , Rfl:   •  amitriptyline (ELAVIL) 25 MG tablet, Take 1 tablet by mouth Daily., Disp: , Rfl:   •  atenolol (TENORMIN) 25 MG tablet, Take 1 tablet by mouth Daily., Disp: , Rfl:   •  atorvastatin (LIPITOR) 20 MG tablet, Take 1 tablet by mouth Daily., Disp: , Rfl:   •  Budesonide-Formoterol Fumarate (SYMBICORT IN), Inhale 2 puffs 2 (two) times a day as needed., Disp: , Rfl:   •  Calcium Carbonate-Vitamin D (CALCIUM 600+D3 PO), Take  by mouth., Disp: , Rfl:   •  celecoxib (CeleBREX) 200 MG capsule, TAKE ONE CAPSULE BY MOUTH ONCE DAILY FOR ARTHRITIS, Disp: , Rfl:   •  Cholecalciferol (Vitamin D3) 1.25 MG (29669 UT)  capsule, Take 1 capsule by mouth 1 (One) Time Per Week., Disp: , Rfl:   •  doxycycline (VIBRAMYCIN) 100 MG capsule, Take 1 capsule by mouth 2 (Two) Times a Day., Disp: , Rfl:   •  ibuprofen (ADVIL,MOTRIN) 600 MG tablet, , Disp: , Rfl:   •  levothyroxine (SYNTHROID, LEVOTHROID) 112 MCG tablet, Take 1 tablet by mouth Daily., Disp: , Rfl:   •  lisinopril (PRINIVIL,ZESTRIL) 10 MG tablet, Take 1 tablet by mouth Every Night., Disp: , Rfl:   •  lisinopril-hydrochlorothiazide (PRINZIDE,ZESTORETIC) 10-12.5 MG per tablet, Take 1 tablet by mouth Every Morning., Disp: , Rfl:   •  meloxicam (MOBIC) 7.5 MG tablet, Take 1 tablet by mouth Daily., Disp: , Rfl:   •  methocarbamol (ROBAXIN) 500 MG tablet, Take 1 tablet by mouth 2 (Two) Times a Day., Disp: , Rfl:   •  nitroglycerin (NITROSTAT) 0.4 MG SL tablet, , Disp: , Rfl:   •  omeprazole (PriLOSEC) 20 MG capsule, Take 1 capsule by mouth 2 (Two) Times a Day., Disp: , Rfl:   •  potassium chloride (K-DUR) 10 MEQ CR tablet, Take 1 tablet by mouth Daily., Disp: , Rfl:   •  tolterodine LA (DETROL LA) 4 MG 24 hr capsule, Take 1 capsule by mouth Daily., Disp: , Rfl:   •  topiramate (TOPAMAX) 50 MG tablet, Take 1 tablet by mouth Daily., Disp: , Rfl:   •  traMADol (ULTRAM) 50 MG tablet, Take 1 tablet by mouth Every 8 (Eight) Hours As Needed., Disp: , Rfl:   •  vitamin B-12 (CYANOCOBALAMIN) 1000 MCG tablet, Take 1 tablet by mouth Daily., Disp: , Rfl:       Review of Systems    Review of Systems   Constitutional: Negative for activity change, appetite change, chills, diaphoresis, fatigue and fever.   HENT: Negative for congestion, dental problem, drooling, ear discharge, ear pain, facial swelling, postnasal drip, sinus pressure, sore throat and swollen glands.    Eyes: Negative for blurred vision, double vision, pain, discharge and itching.   Respiratory: Negative for apnea, cough, choking, chest tightness and shortness of breath.         COPD   Cardiovascular: Negative for chest pain,  "palpitations and leg swelling.   Gastrointestinal: Negative for abdominal distention, abdominal pain, diarrhea, nausea, rectal pain and vomiting.   Genitourinary: Negative for difficulty urinating, dysuria, flank pain, frequency, hematuria, pelvic pain and pelvic pressure.   Neurological: Negative for dizziness, tremors, seizures, syncope, speech difficulty and confusion.   Psychiatric/Behavioral: Negative for agitation and behavioral problems.        Objective     Vital Signs:  Pulse 80   Temp 97 °F (36.1 °C) (Temporal)   Ht 160 cm (63\")   Wt 90.3 kg (199 lb)   SpO2 97%   BMI 35.25 kg/m²   Estimated body mass index is 35.25 kg/m² as calculated from the following:    Height as of this encounter: 160 cm (63\").    Weight as of this encounter: 90.3 kg (199 lb).    Physical Exam  Constitutional:       General: She is not in acute distress.     Appearance: Normal appearance. She is not ill-appearing, toxic-appearing or diaphoretic.   HENT:      Head: Normocephalic and atraumatic.      Nose: No congestion or rhinorrhea.      Mouth/Throat:      Pharynx: Oropharynx is clear. No oropharyngeal exudate or posterior oropharyngeal erythema.   Cardiovascular:      Rate and Rhythm: Normal rate and regular rhythm.      Heart sounds: No murmur heard.  Pulmonary:      Effort: No respiratory distress.      Breath sounds: No stridor. No wheezing, rhonchi or rales.   Chest:      Chest wall: No tenderness.   Abdominal:      General: There is no distension.      Tenderness: There is no abdominal tenderness. There is no right CVA tenderness, left CVA tenderness, guarding or rebound.   Musculoskeletal:         General: No swelling, tenderness or signs of injury.      Cervical back: No rigidity or tenderness.   Skin:     Coloration: Skin is not jaundiced or pale.      Findings: No bruising, erythema or rash.   Neurological:      General: No focal deficit present.      Mental Status: She is alert. Mental status is at baseline. "   Psychiatric:         Mood and Affect: Mood normal.         Behavior: Behavior normal.          Result Review :  The following data was reviewed by Zach Guidry MD     Lab Results  Lab Results   Component Value Date    WBC 7.23 05/02/2023    HGB 13.1 05/02/2023    HCT 40.8 05/02/2023    MCV 84.3 05/02/2023     05/19/2023     Lab Results   Component Value Date    GLUCOSE 127 (H) 05/02/2023    BUN 11 05/02/2023    CREATININE 0.73 05/02/2023    BCR 15.1 05/02/2023    K 3.3 (L) 05/02/2023    CO2 30.2 (H) 05/02/2023    CALCIUM 10.1 05/02/2023    ALBUMIN 3.6 05/02/2023    AST 23 05/02/2023    ALT 11 05/02/2023      No results found for: CRP     No results found for: ACANTHNAEG, AFBCX, BPERTUSSISCX, BLOODCX  No results found for: BCIDPCR, CXREFLEX, CSFCX, CULTURETIS  No results found for: CULTURES, HSVCX, URCX  No results found for: EYECULTURE, GCCX, HSVCULTURE, LABHSV  No results found for: LEGIONELLA, MRSACX, MUMPSCX, MYCOPLASCX  No results found for: NOCARDIACX, STOOLCX  No results found for: THROATCX, UNSTIMCULT, URINECX, CULTURE, VZVCULTUR  No results found for: VIRALCULTU, WOUNDCX    Radiology Results  CT Needle Biopsy Lung    Result Date: 5/19/2023  Impression: CT-guided needle biopsy right lung.  This report was finalized on 5/19/2023 10:34 AM by Dr. Maico Mancera MD.               Assessment / Plan        Diagnoses and all orders for this visit:    1. Simple chronic bronchitis (Primary)  -     C-reactive Protein; Future  -     Fungal Antibodies, Quantitative Double Immunodiffusion; Future  -     Fungitell B-D Glucan; Future  -     Aspergillus Galactomannan Antigen - , Blood, Venous Line; Future  -     Procalcitonin; Future    2. Fungal pneumonia  -     C-reactive Protein; Future  -     Fungal Antibodies, Quantitative Double Immunodiffusion; Future  -     Fungitell B-D Glucan; Future  -     Aspergillus Galactomannan Antigen - , Blood, Venous Line; Future  -     Procalcitonin; Future    Will check  labs today with CRP, pro-calcitonin, fungal antibodies, galactomanna, fungitell.    I strongly doubt patient has a true fungal pneumonia especially with 3 different elements growing and patient is fairly asymptomatic from pulmonary infectious disease standpoint.    Treatment would carry a higher risk vs. Benefit in my opinion but will follow upon results.            Follow Up   No follow-ups on file.    Visit Diagnoses:    ICD-10-CM ICD-9-CM   1. Simple chronic bronchitis  J41.0 491.0   2. Fungal pneumonia  J16.8 117.9    B49 484.7       Patient was given instructions and counseling regarding her condition or for health maintenance advice. Please see specific information pulled into the AVS if appropriate.     This document has been electronically signed by Zach Guidry MD   June 1, 2023 09:09 EDT    Dictated Utilizing Dragon Dictation: Part of this note may be an electronic transcription/translation of spoken language to printed text using the Dragon Dictation System.

## 2023-06-02 LAB
FUNGUS SPEC CULT ORG #2: ABNORMAL
FUNGUS SPEC CULT: ABNORMAL
FUNGUS SPEC FUNGUS STN: ABNORMAL

## 2023-06-03 LAB — 1,3 BETA GLUCAN SER-MCNC: <31 PG/ML

## 2023-06-06 LAB
A FLAVUS AB SER QL ID: NEGATIVE
A FUMIGATUS AB SER QL ID: NEGATIVE
A NIGER AB SER QL ID: NEGATIVE
B DERMAT AB TITR SER: NEGATIVE {TITER}
GALACTOMANNAN AG SPEC IA-ACNC: 0.04 INDEX (ref 0–0.49)

## 2023-06-07 ENCOUNTER — OFFICE VISIT (OUTPATIENT)
Dept: CARDIAC SURGERY | Facility: CLINIC | Age: 73
End: 2023-06-07
Payer: MEDICARE

## 2023-06-07 VITALS
HEIGHT: 63 IN | SYSTOLIC BLOOD PRESSURE: 128 MMHG | OXYGEN SATURATION: 96 % | BODY MASS INDEX: 34.84 KG/M2 | TEMPERATURE: 98.6 F | WEIGHT: 196.6 LBS | HEART RATE: 79 BPM | DIASTOLIC BLOOD PRESSURE: 74 MMHG

## 2023-06-07 DIAGNOSIS — R59.0 MEDIASTINAL LYMPHADENOPATHY: ICD-10-CM

## 2023-06-07 DIAGNOSIS — R91.8 MASS OF RIGHT LUNG: Primary | ICD-10-CM

## 2023-06-07 PROCEDURE — 99203 OFFICE O/P NEW LOW 30 MIN: CPT | Performed by: THORACIC SURGERY (CARDIOTHORACIC VASCULAR SURGERY)

## 2023-06-07 PROCEDURE — 3078F DIAST BP <80 MM HG: CPT | Performed by: THORACIC SURGERY (CARDIOTHORACIC VASCULAR SURGERY)

## 2023-06-07 PROCEDURE — 1160F RVW MEDS BY RX/DR IN RCRD: CPT | Performed by: THORACIC SURGERY (CARDIOTHORACIC VASCULAR SURGERY)

## 2023-06-07 PROCEDURE — 1159F MED LIST DOCD IN RCRD: CPT | Performed by: THORACIC SURGERY (CARDIOTHORACIC VASCULAR SURGERY)

## 2023-06-07 PROCEDURE — 3074F SYST BP LT 130 MM HG: CPT | Performed by: THORACIC SURGERY (CARDIOTHORACIC VASCULAR SURGERY)

## 2023-06-07 NOTE — PROGRESS NOTES
06/07/2023  Patient Information  Caprice Rodriguez                                                                                          [address]  [place] [address]   1950  'PCP/Referring Physician'  Roxie Russo, APRN  [phone]  Dalia Agosto,*  [phone]  Chief Complaint   Patient presents with    Consult     New pt per Dr. Agosto for subclavian artery occulusion and for Dr. Henry for lung nodule possible VATS BX- Pt states that she is SOB, very fatigued with little exertion. Here today for a consult.          History of Present Illness: 73-year-old  female with a history of hyperlipidemia, obesity (BMI 34.83) and remote tobacco abuse who presents with a newly diagnosed right middle lobe lung nodule.  Approximately 3 to 4 months ago, the patient had a mild fall onto a dresser and noticed subsequent right-sided chest pain.  She had a chest x-ray that demonstrated a nodule prompting CT scan and PET scan.  In April, the patient developed a productive cough with yellow/white sputum production.  The patient was diagnosed with a pneumonia and treated.  Her cough and sputum production has subsequently resolved.  The patient had mild hemoptysis after CT-guided needle biopsy that has resolved.  She has lost approximately 20 pounds over the past 3 months.  The patient denies lymphadenopathy, fevers or chills.  Ms Rodriguez denies left arm pain, dizziness or headaches.        Patient Active Problem List   Diagnosis    Pericardial effusion*    Hypertension*    Hyperlipidemia*    COPD (chronic obstructive pulmonary disease)*    Acquired hypothyroidism    CUELLAR (dyspnea on exertion)    Bradycardia, sinus    Atypical migraine    Airway hyperreactivity    CAFL (chronic airflow limitation)    Exomphalos    Anginal equivalent    Subclavian arterial stenosis, left    Mass of right lung    Fungal pneumonia     Past Medical History:   Diagnosis Date    Arthritis     Chest pain     COPD (chronic  obstructive pulmonary disease)     Elevated cholesterol     GERD (gastroesophageal reflux disease)     Hyperlipidemia     Hypertension     Hypokalemia     Hypothyroidism     Pericardial effusion, trivial      Past Surgical History:   Procedure Laterality Date    APPENDECTOMY      BREAST BIOPSY Left     BRONCHOSCOPY Bilateral 05/02/2023    Procedure: BRONCHOSCOPY WITH ENDOBRONCHIAL ULTRASOUND;  Surgeon: Juan Henry MD;  Location: [facility] OR;  Service: Pulmonary;  Laterality: Bilateral;    CARDIAC CATHETERIZATION  2008    CARDIOVASCULAR STRESS TEST  09/2014    CATARACT EXTRACTION, BILATERAL Bilateral     2015 right eye, Dr Hauser  and  2/2017 left eye Dr Meyer    COLONOSCOPY      ECHO - CONVERTED  09/2014    ENDOSCOPY      GALLBLADDER SURGERY      HERNIA REPAIR      TUBAL ABDOMINAL LIGATION         Current Outpatient Medications:     albuterol (ACCUNEB) 0.63 MG/3ML nebulizer solution, USE 1 VIAL IN NEBULIZER EVERY 6 HOURS, Disp: , Rfl:     amitriptyline (ELAVIL) 25 MG tablet, Take 1 tablet by mouth Daily., Disp: , Rfl:     atenolol (TENORMIN) 25 MG tablet, Take 1 tablet by mouth Daily., Disp: , Rfl:     atorvastatin (LIPITOR) 20 MG tablet, Take 1 tablet by mouth Daily., Disp: , Rfl:     Budesonide-Formoterol Fumarate (SYMBICORT IN), Inhale 2 puffs 2 (two) times a day as needed., Disp: , Rfl:     Calcium Carbonate-Vitamin D (CALCIUM 600+D3 PO), Take  by mouth., Disp: , Rfl:     celecoxib (CeleBREX) 200 MG capsule, TAKE ONE CAPSULE BY MOUTH ONCE DAILY FOR ARTHRITIS, Disp: , Rfl:     Cholecalciferol (Vitamin D3) 1.25 MG (22898 UT) capsule, Take 1 capsule by mouth 1 (One) Time Per Week., Disp: , Rfl:     ibuprofen (ADVIL,MOTRIN) 600 MG tablet, , Disp: , Rfl:     levothyroxine (SYNTHROID, LEVOTHROID) 112 MCG tablet, Take 1 tablet by mouth Daily., Disp: , Rfl:     lisinopril (PRINIVIL,ZESTRIL) 10 MG tablet, Take 1 tablet by mouth Every Night., Disp: , Rfl:     lisinopril-hydrochlorothiazide (PRINZIDE,ZESTORETIC) 10-12.5  MG per tablet, Take 1 tablet by mouth Every Morning., Disp: , Rfl:     meloxicam (MOBIC) 7.5 MG tablet, Take 1 tablet by mouth Daily., Disp: , Rfl:     methocarbamol (ROBAXIN) 500 MG tablet, Take 1 tablet by mouth 2 (Two) Times a Day., Disp: , Rfl:     nitroglycerin (NITROSTAT) 0.4 MG SL tablet, , Disp: , Rfl:     omeprazole (PriLOSEC) 20 MG capsule, Take 1 capsule by mouth 2 (Two) Times a Day., Disp: , Rfl:     potassium chloride (K-DUR) 10 MEQ CR tablet, Take 1 tablet by mouth Daily., Disp: , Rfl:     tolterodine LA (DETROL LA) 4 MG 24 hr capsule, Take 1 capsule by mouth Daily., Disp: , Rfl:     topiramate (TOPAMAX) 50 MG tablet, Take 1 tablet by mouth Daily., Disp: , Rfl:     traMADol (ULTRAM) 50 MG tablet, Take 1 tablet by mouth Every 8 (Eight) Hours As Needed., Disp: , Rfl:     vitamin B-12 (CYANOCOBALAMIN) 1000 MCG tablet, Take 1 tablet by mouth Daily., Disp: , Rfl:   No Known Allergies  Social History     Socioeconomic History    Marital status:     Number of children: 1   Tobacco Use    Smoking status: Former     Packs/day: 2.00     Years: 20.00     Pack years: 40.00     Types: Cigarettes     Quit date:      Years since quittin.4     Passive exposure: Past    Smokeless tobacco: Never   Vaping Use    Vaping Use: Never used   Substance and Sexual Activity    Alcohol use: Not Currently     Alcohol/week: 1.0 standard drink     Types: 1 Cans of beer per week     Comment: occasional fire ball for sickness    Drug use: No    Sexual activity: Defer     Family History   Problem Relation Age of Onset    Heart disease Mother     Diabetes Mother     Heart attack Mother         cause of death    Cirrhosis Father         cause of death    Cancer Sister         liver ca    Cancer Brother         lung ca    Cancer Brother         colon ca    Heart disease Brother     Heart failure Brother     Cancer Sister         lung ca    Heart failure Sister     Cancer Sister      Review of Systems   Constitutional:  "Positive for malaise/fatigue. Negative for chills, decreased appetite, fever, weight gain and weight loss.   HENT:  Negative for hearing loss.    Cardiovascular:  Positive for dyspnea on exertion. Negative for chest pain, claudication, cyanosis, irregular heartbeat, leg swelling, near-syncope, orthopnea, palpitations, paroxysmal nocturnal dyspnea and syncope.   Respiratory:  Positive for cough and wheezing. Negative for sputum production.    Endocrine: Negative for polydipsia, polyphagia and polyuria.   Hematologic/Lymphatic: Positive for bleeding problem. Negative for adenopathy. Bruises/bleeds easily.   Musculoskeletal:  Positive for arthritis, back pain, joint swelling and muscle cramps. Negative for falls, gout, joint pain, muscle weakness and myalgias.   Gastrointestinal:  Negative for abdominal pain, anorexia, dysphagia, heartburn, nausea and vomiting.   Genitourinary:  Negative for dysuria and hematuria.   Neurological:  Negative for dizziness, focal weakness, headaches, loss of balance, numbness, seizures, vertigo and weakness.   Psychiatric/Behavioral:  Negative for altered mental status, depression, substance abuse and suicidal ideas. The patient is nervous/anxious.    Allergic/Immunologic: Negative for HIV exposure and persistent infections.   Vitals:    06/07/23 0937   BP: 158/92   Pulse: 79   Temp: 98.6 °F (37 °C)   SpO2: 96%   Weight: 89.2 kg (196 lb 9.6 oz)   Height: 160 cm (63\")      Physical Exam  Vitals reviewed.   Constitutional:       General: She is not in acute distress.     Appearance: Normal appearance.      Comments:  female who appears stated age   HENT:      Head: Normocephalic and atraumatic.      Comments: There is a small palpable mass behind the left ear that is mobile.  The patient previously had a cyst drained in this area.     Nose: Nose normal.   Eyes:      General: No scleral icterus.  Cardiovascular:      Rate and Rhythm: Normal rate and regular rhythm.      Heart " sounds: No murmur heard.    No friction rub. No gallop.   Pulmonary:      Effort: Pulmonary effort is normal. No respiratory distress.      Breath sounds: Normal breath sounds. No rhonchi.   Abdominal:      General: There is no distension.      Palpations: Abdomen is soft. There is no mass.      Tenderness: There is no abdominal tenderness. There is no guarding or rebound.   Musculoskeletal:         General: No deformity.      Cervical back: Neck supple.      Right lower leg: No edema.      Left lower leg: No edema.   Lymphadenopathy:      Cervical: No cervical adenopathy.      Upper Body:      Right upper body: No supraclavicular or axillary adenopathy.      Left upper body: No supraclavicular or axillary adenopathy.   Skin:     General: Skin is warm and dry.      Coloration: Skin is not jaundiced.   Neurological:      Mental Status: She is alert and oriented to person, place, and time.      Gait: Gait normal.   Psychiatric:         Mood and Affect: Mood normal.         Behavior: Behavior normal.         Thought Content: Thought content normal.         Judgment: Judgment normal.       The ROS, past medical history, surgical history, family history, social history, and vitals were reviewed by myself and corrected as needed.      Labs/Imaging:  -CT-guided needle biopsy of the right lung performed 5/19/2023, negative for malignancy.  Rare benign respiratory epithelial cells and pulmonary macrophages are present with hemorrhage, fibrosis and dust pigment present.  -Bronchoscopy/EBUS performed 5/2/2023, Bronchial lavage of the right middle lobe negative for malignancy, bronchial lavage of the right lower lobe negative for malignancy.  Bronchial washings and brushings negative for malignancy.  FNA of station 7 negative for malignancy.  Lymphoid tissue is present within the FNA specimen.  Bronchial washings with cultures positive for Candida albicans and penicillium species  -PET/CT performed 4/11/2023, personally  reviewed, demonstrates a 2.6 x 2.4 cm right middle lobe lung nodule with an SUV of 13.8.  Hypermetabolic activity is present in the right hilum with an SUV of 6.6.  Hypermetabolic activity is present anterior to the maxilla with an SUV of 10.2.  The right fifth rib has a fracture with an SUV of 8.6 and the right posterior seventh rib has a lytic bone lesion with an SUV of 9.8.  A lytic bone lesion is present in the right iliac wing with an SUV of 8.7.  Cervical lymph nodes are present with an SUV of 3.4 and 3.2.  A right cervical node is present measuring 1.1 cm.  -CTA of the chest performed 4/13/2023, personally reviewed, demonstrates a 2.3 cm right middle lobe lung nodule.  There is significant ostial calcification to the left subclavian artery with significant stenosis.    Assessment/Plan:  73-year-old  female with a history of hyperlipidemia, obesity (BMI 34.83) and remote tobacco abuse who presents with a newly diagnosed right middle lobe lung nodule.  These findings are certainly concerning for malignancy given her history of tobacco abuse.  The patient's minor fall against a dresser may be an incidental event or could account for the rib fractures present.  The patient has seen Dr. Guidry with infectious disease given the fungal cultures and I agree with his assessment.  I discussed this case with Dr. Adair Smith for possible navigational bronchoscopy to biopsy both the lung nodule and the right hilum to establish a tissue diagnosis.  The patient will return to clinic after this has been performed to discuss these results.  She has asymptomatic left subclavian stenosis that will be observed at this time.    Patient Active Problem List   Diagnosis    Pericardial effusion*    Hypertension*    Hyperlipidemia*    COPD (chronic obstructive pulmonary disease)*    Acquired hypothyroidism    CUELLAR (dyspnea on exertion)    Bradycardia, sinus    Atypical migraine    Airway hyperreactivity    CAFL (chronic  airflow limitation)    Exomphalos    Anginal equivalent    Subclavian arterial stenosis, left    Mass of right lung    Fungal pneumonia

## 2023-06-08 ENCOUNTER — PREP FOR SURGERY (OUTPATIENT)
Dept: PULMONOLOGY | Facility: CLINIC | Age: 73
End: 2023-06-08
Payer: MEDICARE

## 2023-06-08 DIAGNOSIS — R91.8 MASS OF RIGHT LUNG: Primary | ICD-10-CM

## 2023-06-08 RX ORDER — SODIUM CHLORIDE 0.9 % (FLUSH) 0.9 %
10 SYRINGE (ML) INJECTION AS NEEDED
OUTPATIENT
Start: 2023-06-08

## 2023-06-08 RX ORDER — LIDOCAINE HYDROCHLORIDE 40 MG/ML
4 INJECTION, SOLUTION RETROBULBAR; TOPICAL ONCE
OUTPATIENT
Start: 2023-06-08 | End: 2023-06-08

## 2023-06-08 RX ORDER — SODIUM CHLORIDE 9 MG/ML
40 INJECTION, SOLUTION INTRAVENOUS AS NEEDED
OUTPATIENT
Start: 2023-06-08

## 2023-06-08 RX ORDER — SODIUM CHLORIDE 0.9 % (FLUSH) 0.9 %
3 SYRINGE (ML) INJECTION EVERY 12 HOURS SCHEDULED
OUTPATIENT
Start: 2023-06-08

## 2023-06-08 RX ORDER — SODIUM CHLORIDE 9 MG/ML
125 INJECTION, SOLUTION INTRAVENOUS CONTINUOUS
OUTPATIENT
Start: 2023-06-08

## 2023-06-13 ENCOUNTER — OFFICE VISIT (OUTPATIENT)
Dept: PULMONOLOGY | Facility: CLINIC | Age: 73
End: 2023-06-13
Payer: MEDICARE

## 2023-06-13 ENCOUNTER — OFFICE VISIT (OUTPATIENT)
Dept: INFECTIOUS DISEASES | Facility: CLINIC | Age: 73
End: 2023-06-13
Payer: MEDICARE

## 2023-06-13 VITALS
HEIGHT: 63 IN | DIASTOLIC BLOOD PRESSURE: 81 MMHG | BODY MASS INDEX: 34.76 KG/M2 | HEART RATE: 78 BPM | TEMPERATURE: 97 F | OXYGEN SATURATION: 96 % | SYSTOLIC BLOOD PRESSURE: 145 MMHG | WEIGHT: 196.2 LBS

## 2023-06-13 VITALS
OXYGEN SATURATION: 98 % | HEART RATE: 76 BPM | SYSTOLIC BLOOD PRESSURE: 140 MMHG | WEIGHT: 196 LBS | HEIGHT: 63 IN | BODY MASS INDEX: 34.73 KG/M2 | TEMPERATURE: 98 F | DIASTOLIC BLOOD PRESSURE: 80 MMHG | RESPIRATION RATE: 18 BRPM

## 2023-06-13 DIAGNOSIS — R91.8 MASS OF MIDDLE LOBE OF RIGHT LUNG: Primary | ICD-10-CM

## 2023-06-13 DIAGNOSIS — Z87.891 FORMER SMOKER: ICD-10-CM

## 2023-06-13 DIAGNOSIS — B49 FUNGAL PNEUMONIA: Primary | ICD-10-CM

## 2023-06-13 DIAGNOSIS — J41.0 SIMPLE CHRONIC BRONCHITIS: ICD-10-CM

## 2023-06-13 DIAGNOSIS — J96.11 CHRONIC RESPIRATORY FAILURE WITH HYPOXIA: ICD-10-CM

## 2023-06-13 DIAGNOSIS — E66.9 OBESITY (BMI 30-39.9): ICD-10-CM

## 2023-06-13 DIAGNOSIS — J16.8 FUNGAL PNEUMONIA: Primary | ICD-10-CM

## 2023-06-13 PROBLEM — J44.89 CHRONIC BRONCHIOLITIS: Status: ACTIVE | Noted: 2022-06-30

## 2023-06-13 PROCEDURE — 99213 OFFICE O/P EST LOW 20 MIN: CPT | Performed by: NURSE PRACTITIONER

## 2023-06-13 PROCEDURE — 1159F MED LIST DOCD IN RCRD: CPT | Performed by: NURSE PRACTITIONER

## 2023-06-13 PROCEDURE — 3077F SYST BP >= 140 MM HG: CPT | Performed by: PHYSICIAN ASSISTANT

## 2023-06-13 PROCEDURE — 1160F RVW MEDS BY RX/DR IN RCRD: CPT | Performed by: NURSE PRACTITIONER

## 2023-06-13 PROCEDURE — 3079F DIAST BP 80-89 MM HG: CPT | Performed by: PHYSICIAN ASSISTANT

## 2023-06-13 PROCEDURE — 3077F SYST BP >= 140 MM HG: CPT | Performed by: NURSE PRACTITIONER

## 2023-06-13 PROCEDURE — 3079F DIAST BP 80-89 MM HG: CPT | Performed by: NURSE PRACTITIONER

## 2023-06-13 NOTE — PROGRESS NOTES
[facility]         Referring Provider: Zach Guidry MD  [address] [place] [address]    Subjective      Chief Complaint  Follow-up (bronchitis)    History of Present Illness  Caprice Rodriguez is a 73 y.o. female who presents today to [facility] for Follow Up . Past medical history is significant for COPD and recent diagnosis of lung nodule consistent with malignancy. CT guided biopsy revealed no fungal elements or evidence of infectious process but BAL showed growth of Candida Albicans on one specimen, Penicillium in another specimen and Cladosporium in another specimen.     Interval history:  6/13/2023: The patient denies any fever/chills, chest pain, abdominal pain, nausea/vomiting/diarrhea.  She stated she is no more short of breath than usual.  Denies night sweats.    Past Medical History:   Diagnosis Date    Arthritis     Chest pain     COPD (chronic obstructive pulmonary disease)     Elevated cholesterol     GERD (gastroesophageal reflux disease)     Hyperlipidemia     Hypertension     Hypokalemia     Hypothyroidism     Pericardial effusion, trivial        Past Surgical History:   Procedure Laterality Date    APPENDECTOMY      BREAST BIOPSY Left     BRONCHOSCOPY Bilateral 05/02/2023    Procedure: BRONCHOSCOPY WITH ENDOBRONCHIAL ULTRASOUND;  Surgeon: Juan Henry MD;  Location: [facility];  Service: Pulmonary;  Laterality: Bilateral;    CARDIAC CATHETERIZATION  2008    CARDIOVASCULAR STRESS TEST  09/2014    CATARACT EXTRACTION, BILATERAL Bilateral     2015 right eye, Dr Hauser  and  2/2017 left eye Dr Meyer    COLONOSCOPY      ECHO - CONVERTED  09/2014    ENDOSCOPY      GALLBLADDER SURGERY      HERNIA REPAIR      TUBAL ABDOMINAL LIGATION         Social History     Socioeconomic History    Marital status:     Number of children: 1   Tobacco Use    Smoking status: Former     Packs/day: 2.00     Years: 20.00     Pack years: 40.00      Types: Cigarettes     Quit date:      Years since quittin.4     Passive exposure: Past    Smokeless tobacco: Never   Vaping Use    Vaping Use: Never used   Substance and Sexual Activity    Alcohol use: Not Currently     Alcohol/week: 1.0 standard drink     Types: 1 Cans of beer per week     Comment: occasional fire ball for sickness    Drug use: No    Sexual activity: Defer       Family History  family history includes Cancer in her brother, brother, sister, sister, and sister; Cirrhosis in her father; Diabetes in her mother; Heart attack in her mother; Heart disease in her brother and mother; Heart failure in her brother and sister.    Immunization History   Administered Date(s) Administered    COVID-19 (MODERNA) 1st,2nd,3rd Dose Monovalent 2021, 2021, 2021, 2021, 12/10/2021    COVID-19 (UNSPECIFIED) 2021, 2021, 12/10/2021    Flu Vaccine Quad PF >36MO 2017    Fluzone High Dose =>65 Years ([employer] ONLY) 10/04/2013, 10/05/2015, 2018, 11/10/2021, 2022    Hepatitis A 2019    Influenza LAIV (Nasal) 10/05/2020    Influenza Quad Vaccine (Inpatient) 2010    Pneumococcal Conjugate 13-Valent (PCV13) 2016, 2018    Tdap 10/27/2017, 10/27/2017        Allergies  No Known Allergies    The medication list has been reviewed and updated.   Current Medications    Current Outpatient Medications:     albuterol (ACCUNEB) 0.63 MG/3ML nebulizer solution, USE 1 VIAL IN NEBULIZER EVERY 6 HOURS, Disp: , Rfl:     amitriptyline (ELAVIL) 25 MG tablet, Take 1 tablet by mouth Daily., Disp: , Rfl:     atenolol (TENORMIN) 25 MG tablet, Take 1 tablet by mouth Daily., Disp: , Rfl:     atorvastatin (LIPITOR) 20 MG tablet, Take 1 tablet by mouth Daily., Disp: , Rfl:     Budesonide-Formoterol Fumarate (SYMBICORT IN), Inhale 2 puffs 2 (two) times a day as needed., Disp: , Rfl:     Calcium Carbonate-Vitamin D (CALCIUM 600+D3 PO), Take  by mouth., Disp: , Rfl:      "celecoxib (CeleBREX) 200 MG capsule, TAKE ONE CAPSULE BY MOUTH ONCE DAILY FOR ARTHRITIS, Disp: , Rfl:     Cholecalciferol (Vitamin D3) 1.25 MG (47744 UT) capsule, Take 1 capsule by mouth 1 (One) Time Per Week., Disp: , Rfl:     ibuprofen (ADVIL,MOTRIN) 600 MG tablet, , Disp: , Rfl:     levothyroxine (SYNTHROID, LEVOTHROID) 112 MCG tablet, Take 1 tablet by mouth Daily., Disp: , Rfl:     lisinopril (PRINIVIL,ZESTRIL) 10 MG tablet, Take 1 tablet by mouth Every Night., Disp: , Rfl:     lisinopril-hydrochlorothiazide (PRINZIDE,ZESTORETIC) 10-12.5 MG per tablet, Take 1 tablet by mouth Every Morning., Disp: , Rfl:     meloxicam (MOBIC) 7.5 MG tablet, Take 1 tablet by mouth Daily., Disp: , Rfl:     methocarbamol (ROBAXIN) 500 MG tablet, Take 1 tablet by mouth 2 (Two) Times a Day., Disp: , Rfl:     nitroglycerin (NITROSTAT) 0.4 MG SL tablet, , Disp: , Rfl:     omeprazole (PriLOSEC) 20 MG capsule, Take 1 capsule by mouth 2 (Two) Times a Day., Disp: , Rfl:     potassium chloride (K-DUR) 10 MEQ CR tablet, Take 1 tablet by mouth Daily., Disp: , Rfl:     tolterodine LA (DETROL LA) 4 MG 24 hr capsule, Take 1 capsule by mouth Daily., Disp: , Rfl:     topiramate (TOPAMAX) 50 MG tablet, Take 1 tablet by mouth Daily., Disp: , Rfl:     traMADol (ULTRAM) 50 MG tablet, Take 1 tablet by mouth Every 8 (Eight) Hours As Needed., Disp: , Rfl:     vitamin B-12 (CYANOCOBALAMIN) 1000 MCG tablet, Take 1 tablet by mouth Daily., Disp: , Rfl:       Review of Systems    Review of Systems   Constitutional: Negative.    Respiratory:  Positive for shortness of breath.    Cardiovascular: Negative.    Gastrointestinal: Negative.    Genitourinary: Negative.    Neurological: Negative.       Objective     Vital Signs:  There were no vitals taken for this visit.  Estimated body mass index is 34.73 kg/m² as calculated from the following:    Height as of an earlier encounter on 6/13/23: 160 cm (62.99\").    Weight as of an earlier encounter on 6/13/23: 88.9 kg " (196 lb).    Physical Exam  Vitals reviewed.   Constitutional:       Appearance: Normal appearance. She is obese.   HENT:      Head: Normocephalic and atraumatic.   Eyes:      Pupils: Pupils are equal, round, and reactive to light.   Cardiovascular:      Rate and Rhythm: Normal rate and regular rhythm.   Pulmonary:      Effort: Pulmonary effort is normal.      Breath sounds: Normal breath sounds.   Abdominal:      General: Bowel sounds are normal.      Palpations: Abdomen is soft.   Musculoskeletal:         General: Normal range of motion.      Cervical back: Normal range of motion and neck supple.   Skin:     General: Skin is warm and dry.      Capillary Refill: Capillary refill takes less than 2 seconds.   Neurological:      Mental Status: She is alert and oriented to person, place, and time.        Result Review :  The following data was reviewed by STEPHANY Newman     Lab Results  Lab Results   Component Value Date    WBC 7.23 05/02/2023    HGB 13.1 05/02/2023    HCT 40.8 05/02/2023    MCV 84.3 05/02/2023     05/19/2023     Lab Results   Component Value Date    GLUCOSE 127 (H) 05/02/2023    BUN 11 05/02/2023    CREATININE 0.73 05/02/2023    BCR 15.1 05/02/2023    K 3.3 (L) 05/02/2023    CO2 30.2 (H) 05/02/2023    CALCIUM 10.1 05/02/2023    ALBUMIN 3.6 05/02/2023    AST 23 05/02/2023    ALT 11 05/02/2023      Lab Results   Component Value Date    CRP 0.74 (H) 06/01/2023        No results found for: ACANTHNAEG, AFBCX, BPERTUSSISCX, BLOODCX  No results found for: BCIDPCR, CXREFLEX, CSFCX, CULTURETIS  No results found for: CULTURES, HSVCX, URCX  No results found for: EYECULTURE, GCCX, HSVCULTURE, LABHSV  No results found for: LEGIONELLA, MRSACX, MUMPSCX, MYCOPLASCX  No results found for: NOCARDIACX, STOOLCX  No results found for: THROATCX, UNSTIMCULT, URINECX, CULTURE, VZVCULTUR  No results found for: VIRALCULTU, WOUNDCX    Radiology Results  CT Needle Biopsy Lung    Result Date: 5/19/2023  Impression:  CT-guided needle biopsy right lung.  This report was finalized on 5/19/2023 10:34 AM by Dr. Maico Mancera MD.              Assessment / Plan        Diagnoses and all orders for this visit:    1. Fungal pneumonia (Primary)    2. Simple chronic bronchitis      At her last visit with Dr. Guidry, for this patient had fungal antibodies, Fungitell, Aspergillus galactomannan and procalcitonin drawn.  CRP was slightly elevated at 0.74.  Fungal antibodies are negative.  Fungitell was negative at less than 31.  Aspergillus galactomannan was negative.  Procalcitonin was normal at 0.06.    The patient states she followed up with pulmonology today.  She is set up to see Dr. Smith at [facility] for repeat bronchoscopy with biopsy on 6/28/2023.  As her fungal work-up was negative and she is doing well on room air, do not plan to treat for fungal pneumonia at this time.  Recommend to follow-up with Dr. Guidry after repeat bronchoscopy and biopsy.          Follow Up   No follow-ups on file.    Visit Diagnoses:    ICD-10-CM ICD-9-CM   1. Fungal pneumonia  J16.8 117.9    B49 484.7   2. Simple chronic bronchitis  J41.0 491.0       Patient was given instructions and counseling regarding her condition or for health maintenance advice. Please see specific information pulled into the AVS if appropriate.     This document has been electronically signed by STEPHANY Newman   June 13, 2023 10:09 EDT    Dictated Utilizing Dragon Dictation: Part of this note may be an electronic transcription/translation of spoken language to printed text using the Dragon Dictation System.

## 2023-06-17 LAB
ACID FAST STN SPEC: NEGATIVE
MYCOBACTERIUM SPEC QL CULT: NEGATIVE
SPECIMEN PREPARATION: NORMAL

## 2023-06-19 NOTE — PROGRESS NOTES
Subjective      Chief Complaint  Mass of Middle Lobe of Right Lung     Subjective      History of Present Illness  Caprice Rodriguez is a 73 y.o. female who presents today to [facility] with past medical history of essential hypertension, hyperlipidemia, hypothyroidism, and COPD who presents today for Mass of Middle Lobe of Right Lung . This visit is a follow up appointment.     Mass of Middle Lobe of Right Lung :  Patient previously had PET/CT scan that was concerning for underlying malignancy with metastasis to bone and possible pathologic fracture. She has underwent multiple biopsies including a bronchoscopy with biopsy and CT needle guided biopsy which were both negative for malignancy. The bronchoscopy fungal cultures were positive for multiple different fungal species including Candida albicans, penicillium, and cladosporium. She does currently follow with infectious disease for further evaluation of positive bronchoscopy cultures. She had an appointment with CT surgery who recommended navigational bronchoscopy which is scheduled for 06/28/23.     Patient reports that her symptoms are currently at baseline. She denies having any shortness of breath, cough, or wheezing. She continues to use her Symbicort inhaler 2 puffs twice daily. She does use an albuterol inhaler as needed. She is compliant with using her supplemental oxygen as needed.       Current Outpatient Medications:     albuterol (ACCUNEB) 0.63 MG/3ML nebulizer solution, USE 1 VIAL IN NEBULIZER EVERY 6 HOURS, Disp: , Rfl:     amitriptyline (ELAVIL) 25 MG tablet, Take 1 tablet by mouth Daily., Disp: , Rfl:     atenolol (TENORMIN) 25 MG tablet, Take 1 tablet by mouth Daily., Disp: , Rfl:     atorvastatin (LIPITOR) 20 MG tablet, Take 1 tablet by mouth Daily., Disp: , Rfl:     Budesonide-Formoterol Fumarate (SYMBICORT IN), Inhale 2 puffs 2 (two) times a day as needed., Disp: , Rfl:     Calcium  "Carbonate-Vitamin D (CALCIUM 600+D3 PO), Take  by mouth., Disp: , Rfl:     celecoxib (CeleBREX) 200 MG capsule, TAKE ONE CAPSULE BY MOUTH ONCE DAILY FOR ARTHRITIS, Disp: , Rfl:     Cholecalciferol (Vitamin D3) 1.25 MG (43371 UT) capsule, Take 1 capsule by mouth 1 (One) Time Per Week., Disp: , Rfl:     ibuprofen (ADVIL,MOTRIN) 600 MG tablet, , Disp: , Rfl:     levothyroxine (SYNTHROID, LEVOTHROID) 112 MCG tablet, Take 1 tablet by mouth Daily., Disp: , Rfl:     lisinopril (PRINIVIL,ZESTRIL) 10 MG tablet, Take 1 tablet by mouth Every Night., Disp: , Rfl:     lisinopril-hydrochlorothiazide (PRINZIDE,ZESTORETIC) 10-12.5 MG per tablet, Take 1 tablet by mouth Every Morning., Disp: , Rfl:     meloxicam (MOBIC) 7.5 MG tablet, Take 1 tablet by mouth Daily., Disp: , Rfl:     methocarbamol (ROBAXIN) 500 MG tablet, Take 1 tablet by mouth 2 (Two) Times a Day., Disp: , Rfl:     nitroglycerin (NITROSTAT) 0.4 MG SL tablet, , Disp: , Rfl:     omeprazole (PriLOSEC) 20 MG capsule, Take 1 capsule by mouth 2 (Two) Times a Day., Disp: , Rfl:     potassium chloride (K-DUR) 10 MEQ CR tablet, Take 1 tablet by mouth Daily., Disp: , Rfl:     tolterodine LA (DETROL LA) 4 MG 24 hr capsule, Take 1 capsule by mouth Daily., Disp: , Rfl:     topiramate (TOPAMAX) 50 MG tablet, Take 1 tablet by mouth Daily., Disp: , Rfl:     traMADol (ULTRAM) 50 MG tablet, Take 1 tablet by mouth Every 8 (Eight) Hours As Needed., Disp: , Rfl:     vitamin B-12 (CYANOCOBALAMIN) 1000 MCG tablet, Take 1 tablet by mouth Daily., Disp: , Rfl:       No Known Allergies    Objective     Objective   Vital Signs:  /80   Pulse 76   Temp 98 °F (36.7 °C) (Temporal)   Resp 18   Ht 160 cm (62.99\")   Wt 88.9 kg (196 lb)   SpO2 98%   BMI 34.73 kg/m²   Estimated body mass index is 34.73 kg/m² as calculated from the following:    Height as of this encounter: 160 cm (62.99\").    Weight as of this encounter: 88.9 kg (196 lb).    Past Medical History:   Diagnosis Date    " Arthritis     Chest pain     COPD (chronic obstructive pulmonary disease)     Elevated cholesterol     GERD (gastroesophageal reflux disease)     Hyperlipidemia     Hypertension     Hypokalemia     Hypothyroidism     Pericardial effusion, trivial      Past Surgical History:   Procedure Laterality Date    APPENDECTOMY      BREAST BIOPSY Left     BRONCHOSCOPY Bilateral 2023    Procedure: BRONCHOSCOPY WITH ENDOBRONCHIAL ULTRASOUND;  Surgeon: Juan Henry MD;  Location: [facility];  Service: Pulmonary;  Laterality: Bilateral;    CARDIAC CATHETERIZATION      CARDIOVASCULAR STRESS TEST  2014    CATARACT EXTRACTION, BILATERAL Bilateral      right eye, Dr Hauser  and  2017 left eye Dr Meyer    COLONOSCOPY      ECHO - CONVERTED  2014    ENDOSCOPY      GALLBLADDER SURGERY      HERNIA REPAIR      TUBAL ABDOMINAL LIGATION       Social History     Socioeconomic History    Marital status:     Number of children: 1   Tobacco Use    Smoking status: Former     Packs/day: 2.00     Years: 20.00     Pack years: 40.00     Types: Cigarettes     Quit date:      Years since quittin.4     Passive exposure: Past    Smokeless tobacco: Never   Vaping Use    Vaping Use: Never used   Substance and Sexual Activity    Alcohol use: Not Currently     Alcohol/week: 1.0 standard drink     Types: 1 Cans of beer per week     Comment: occasional fire ball for sickness    Drug use: No    Sexual activity: Defer      Physical Exam  Constitutional:       General: She is awake.      Appearance: Normal appearance. She is obese.   HENT:      Head: Normocephalic and atraumatic.      Nose: Nose normal.      Mouth/Throat:      Mouth: Mucous membranes are moist.      Pharynx: Oropharynx is clear.   Eyes:      Conjunctiva/sclera: Conjunctivae normal.      Pupils: Pupils are equal, round, and reactive to light.   Cardiovascular:      Rate and Rhythm: Normal rate and regular rhythm.      Pulses: Normal pulses.      Heart sounds:  Normal heart sounds. No murmur heard.    No friction rub. No gallop.   Pulmonary:      Effort: Pulmonary effort is normal. No tachypnea, accessory muscle usage or respiratory distress.      Breath sounds: Normal breath sounds. No decreased breath sounds, wheezing, rhonchi or rales.      Comments: Tolerating room air during exam.   Musculoskeletal:         General: Normal range of motion.      Cervical back: Full passive range of motion without pain and normal range of motion.   Skin:     General: Skin is warm and dry.   Neurological:      General: No focal deficit present.      Mental Status: She is alert. Mental status is at baseline.   Psychiatric:         Behavior: Behavior is cooperative.      Result Review :  The following labs and radiology results have been reviewed.    Lab Review:   No results found for: FEV1, FVC, IMK6YMJ, TLC, DLCO  Office Visit on 06/01/2023   Component Date Value Ref Range Status    C-Reactive Protein 06/01/2023 0.74 (H)  0.00 - 0.50 mg/dL Final    Aspergillus fumigatus 06/01/2023 Negative  Neg:<1:1 Final    Aspergillus flavus 06/01/2023 Negative  Neg:<1:1 Final    Aspergillus niger 06/01/2023 Negative  Neg:<1:1 Final    Blastomyces Abs, Qn, DID 06/01/2023 Negative  Neg:<1:1 Final    FUNGITELL RESULT 06/01/2023 <31  <80 pg/mL Final    Comment: Interpretation: The Fungitell assay does not detect  certain fungal species such as the genus Cryptococcus  (Lala et al. 1991) which produces very low levels of  (1-3)-Beta-D-Glucan. The assay also does not detect the  Zygomycetes such as Absidia, Mucor and Rhizopus (Cricket  et al. 1994) which are not known to produce  (1-3)-Beta-D-Glucan. In addition, the yeast phase of  Blastomyces dermatitidis produces little  (1-3)-Beta-D-Glucan and may not be detected by the assay  (Sanjana et al. 2007).  Reference Range:  Less than 60 pg/mL. Glucan values of less than 60 pg/mL  are interpreted as negative.  Glucan values of 60 to 79 pg/mL are interpreted  as  indeterminate, and suggest a possible fungal infection.  Additional sampling and testing of sera is required to  interpret the results.  Glucan values of greater than or equal to 80 pg/mL are  interpreted as positive.  Due to the potential for environmental contamination when  transferred to pour-off tubes, which can lead to                            false  positive results, interpret positive results from samples  provided in pour-off tubes with caution.  Results should  be used in conjunction with clinical findings, and should  not form the sole basis for a diagnosis or treatment  decision. The Fungitell test is approved or cleared for in  vitro diagnostic use by the U.S Food and Drug  Administration. Modifications to the approved package  insert have been made and the performance characteristics  for these modifications were determined by [employer].  If sample result is greater than 500 pg/mL, physician may  order a titer of the sample. Please contact [employer] if you would like to order a retest of this sample  to obtain an actual value. Samples are held for 1 week  after initial testing date.    Aspergillus Ag, BAL/Serum 2023 0.04  0.00 - 0.49 Index Final    Procalcitonin 2023 0.06  0.00 - 0.25 ng/mL Final   Hospital Outpatient Visit on 2023   Component Date Value Ref Range Status    Protime 2023 13.7  12.1 - 14.7 Seconds Final    INR 2023 1.00  0.90 - 1.10 Final    Platelets 2023 231  140 - 450 10*3/mm3 Final    Reference Lab Report 2023    Final                    Value:[facility]  [address] [place] [address]  Phone: [phone] or [phone]  Fax: [phone]  Houston Kerr M.D., Medical Director    PATIENT NAME                           LABORATORY NO.  [id number]  SHERI LANDRY                         [id number]  [id number]                         AGE              SEX  SSN           CLIENT REF  #  [facility]              73      1950  F    [id number]   [id number]    [address]                     REQUESTING M.D.     ATTENDING M.D.     COPY TOABIGAIL HECTOR [id number]                   MIMI MORAN  DATE COLLECTED      DATE RECEIVED      DATE REPORTED  2023    DIAGNOSIS:  LUNG, BIOPSY, RIGHT, NEEDLE CORE BIOPSIES:  Benign alveolated lung parenchyma with hemorrhage, focal fibrosis, and dust  pigment  Negative for dysplasia or malignancy    ISATU    CLINICAL HISTORY:  Pulmonary nodule, right lung    SPECIMENS RECEIVED:  LUNG, BIOPSY,                           RIGHT    MICROSCOPIC DESCRIPTION:  Tissue blocks are prepared and slides are examined microscopically on all  specimens. See diagnosis for details.    Professional interpretation rendered by Ricardo Wynne M.D., CARMENCITA at [employer], [address] [place] [address].    GROSS DESCRIPTION:  Labeled right lung are 2 core biopsies of tan soft tissue ranging in length from  1.0 to 1.5 cm and each measuring 0.1 cm in diameter, submitted entirely in 2  blocks.  MTH    REVIEWED, DIAGNOSED AND ELECTRONICALLY  SIGNED BY:    Ricardo Wynne M.D., NELL.  CPT CODES:  05344      Reference Lab Report 2023    Final                    Value:[facility]  [address] [place] [address]  Phone: [phone] or [phone]  Fax: [phone]  Houston Kerr M.D., Medical Director    PATIENT NAME                                 LABORATORY NO.  SHERI TRUJILLO                               [id number]  [id number]                                     AGE                SEX     SSN            CLIENT REF #  [facility]                          73       1950          [id number]    [id number]  [address]                                 REQUESTING M.D.       ATTENDING M.D..        COPY TO.ABIGAIL HECTOR [id number]                                MIMI MORAN  DATE COLLECTED        DATE RECEIVED          DATE REPORTED  05/19/2023 05/19/2023 05/23/2023    DIAGNOSIS:  LUNG, FNA, RIGHT:  Negative for malignant cells.    MICROSCOPIC DESCRIPTION:  Rare benign ciliated respiratory epithelial cells and pulmonary macrophages.    Professional interpretation                           rendered by Ricardo Wynne M.D., CARMENCITA at [employer], [employer], [address] [place] [address].    CLINICAL HISTORY:  Pulmonary nodule    SPECIMENS SUBMITTED:  LUNG, FNA, RIGHT    GROSS SPECIMEN DESCRIPTION:  30cc of cloudy dark pink fluid with sediment in fixative    REVIEWED, DIAGNOSED AND ELECTRONICALLY  SIGNED BY:    Ricardo Wynne M.D., NELL.  CPT CODES:  39184      Tissue Culture 05/19/2023 No growth at 3 days   Final    Gram Stain 05/19/2023 Rare (1+) WBCs seen   Final    Gram Stain 05/19/2023 No organisms seen   Final   Admission on 05/02/2023, Discharged on 05/02/2023   Component Date Value Ref Range Status    WBC 05/02/2023 7.23  3.40 - 10.80 10*3/mm3 Final    RBC 05/02/2023 4.84  3.77 - 5.28 10*6/mm3 Final    Hemoglobin 05/02/2023 13.1  12.0 - 15.9 g/dL Final    Hematocrit 05/02/2023 40.8  34.0 - 46.6 % Final    MCV 05/02/2023 84.3  79.0 - 97.0 fL Final    MCH 05/02/2023 27.1  26.6 - 33.0 pg Final    MCHC 05/02/2023 32.1  31.5 - 35.7 g/dL Final    RDW 05/02/2023 15.3  12.3 - 15.4 % Final    RDW-SD 05/02/2023 46.7  37.0 - 54.0 fl Final    MPV 05/02/2023 9.1  6.0 - 12.0 fL Final    Platelets 05/02/2023 211  140 - 450 10*3/mm3 Final    Glucose 05/02/2023 127 (H)  65 - 99 mg/dL Final    BUN 05/02/2023 11  8 - 23 mg/dL Final    Creatinine 05/02/2023 0.73  0.57 - 1.00 mg/dL Final    Sodium 05/02/2023 143  136 - 145 mmol/L Final    Potassium 05/02/2023 3.3 (L)  3.5 - 5.2 mmol/L Final    Chloride 05/02/2023 105  98 - 107 mmol/L Final    CO2 05/02/2023 30.2 (H)  22.0 - 29.0 mmol/L Final    Calcium 05/02/2023 10.1  8.6 - 10.5 mg/dL Final     Total Protein 2023 7.1  6.0 - 8.5 g/dL Final    Albumin 2023 3.6  3.5 - 5.2 g/dL Final    ALT (SGPT) 2023 11  1 - 33 U/L Final    AST (SGOT) 2023 23  1 - 32 U/L Final    Alkaline Phosphatase 2023 83  39 - 117 U/L Final    Total Bilirubin 2023 0.7  0.0 - 1.2 mg/dL Final    Globulin 2023 3.5  gm/dL Final    A/G Ratio 2023 1.0  g/dL Final    BUN/Creatinine Ratio 2023 15.1  7.0 - 25.0 Final    Anion Gap 2023 7.8  5.0 - 15.0 mmol/L Final    eGFR 2023 85.9  >60.0 mL/min/1.73 Final    Protime 2023 14.2  12.1 - 14.7 Seconds Final    INR 2023 1.05  0.90 - 1.10 Final    Fungus Stain 2023 Final report   Final    KOH/Calcofluor preparation 2023 Comment   Final    KOH/Calcofluor preparation:  no fungus observed.    Culture 2023 Final report (A)   Final    Fungus (Mycology) Result 1 2023 Cladosporium species (A)   Final    AFB Specimen Processing 2023 Concentration   Final    Acid Fast Smear 2023 Negative   Final    Culture 2023 Negative   Final    No acid fast bacilli isolated after 6 weeks.    BAL Culture 2023 <10,000 CFU/mL Normal respiratory sarahy. No S. aureus or Pseudomonas aeruginosa detected. Final report.   Final    Gram Stain 2023 WBCs seen   Final    Gram Stain 2023 Gram positive cocci in pairs   Final    Reference Lab Report 2023    Final                    Value:[facility]  [address] [place] [address]  Phone: [phone] or [phone]  Fax: [phone]  Houston Kerr M.D., Medical Director    PATIENT NAME                                 LABORATORY NO.  SHERI TRUJILLO                               [id number]  [id number]                                     AGE                SEX     SSN            CLIENT REF #  [facility] [place]                          73       1950   F       [id number]    [id number]  [address]                                 REQUESTING M.D.       ATTENDING [place]..        [place] [address]                               DONNA DENIS  DATE COLLECTED        DATE RECEIVED          DATE REPORTED  05/02/2023 05/02/2023 05/04/2023    DIAGNOSIS:  A.      BRONCHIAL LAVAGE, RIGHT MIDDLE LOBE:  Negative for malignant cells.  B.      BRONCHIAL LAVAGE, RIGHT LOWER LOBE:  Negative for malignant cells.  C.      BRONCH WASH:  Negative for                           malignant cells.  D.      BRONCH BRUSH, RIGHT MIDDLE LOBE:  Negative for malignant cells.  E.      LYMPH NODE, FNA, STATION 7:  Negative for malignant cells.    MICROSCOPIC DESCRIPTION:  A.     Ciliated respiratory epithelial cells and pulmonary macrophages.  B.     Ciliated respiratory epithelial cells and pulmonary macrophages.  C.     Ciliated respiratory epithelial cells and pulmonary macrophages.  D.     Ciliated respiratory epithelial cells and pulmonary macrophages.  E.     The specimen shows a polymorphic lymphoid population.  The findings  are most consistent with a benign reactive lymphadenopathy; however, if  the node is large, continues to increase in size or fails to respond to  antibiotics, excision to definitely exclude a low grade lymphoma or  Hodgkin's lymphoma is recommended.  Unfixed tissue should be  submitted in transport media for immunophenotyping by flow cytometry.    Professional interpretation rendered by Ricardo Wynne M.D., F.C.A.P. at [employer], [employer], [address] [place] [address].    CLINICAL HISTORY:  Lung mass    SPECIMENS SUBMITTED:  A.    BRONCHIAL LAVAGE, RIGHT MIDDLE LOBE  B.    BRONCHIAL LAVAGE, RIGHT LOWER LOBE  C.    BRONCH WASH  D.    BRONCH BRUSH, RIGHT MIDDLE LOBE  E.    LYMPH NODE, FNA, STATION 7    GROSS SPECIMEN DESCRIPTION:  A.     35cc of clear colorless fluid with scant sediment in fixative  B.     35cc of clear colorless fluid with scant  sediment in fixative  C.     40cc of dark red fluid with visible sediment in fixative  Cell block has been examined.  D.     1 premade smear in alcohol with brush present  E.     45cc of dark red fluid with visible sediment in fixative  Cell block has been examined.    REVIEWED, DIAGNOSED AND ELECTRONICALLY  SIGNED BY:    Ricardo Wynne M.D., CARMENCITA  CPT CODES:  88112x4, 88305x2, 24091      Fungus Stain 05/02/2023 Final report   Final    KOH/Calcofluor preparation 05/02/2023 Comment   Final    KOH/Calcofluor preparation:  no fungus observed.    Culture 05/02/2023 Final report (A)   Final    Fungus (Mycology) Result 1 05/02/2023 Candida albicans (A)   Final    Fungus (Mycology) Result 2 05/02/2023 Penicillium species (A)   Final    AFB Specimen Processing 05/02/2023 Concentration   Final    Acid Fast Smear 05/02/2023 Negative   Final    Culture 05/02/2023 Negative   Final    No acid fast bacilli isolated after 6 weeks.    BAL Culture 05/02/2023 <10,000 CFU/mL Normal respiratory sarahy. No S. aureus or Pseudomonas aeruginosa detected. Final report.   Final    Gram Stain 05/02/2023 WBCs seen   Final    Gram Stain 05/02/2023 Gram positive cocci in pairs   Final    Fungus Stain 05/02/2023 Final report   Final    KOH/Calcofluor preparation 05/02/2023 Comment   Final    KOH/Calcofluor preparation:  no fungus observed.    Culture 05/02/2023 Final report (A)   Final    Fungus (Mycology) Result 1 05/02/2023 Candida albicans (A)   Final    AFB Specimen Processing 05/02/2023 Concentration   Final    Acid Fast Smear 05/02/2023 Negative   Final    Culture 05/02/2023 Negative   Final    No acid fast bacilli isolated after 6 weeks.    Respiratory Culture 05/02/2023 Light growth (2+) Normal respiratory sarahy. No S. aureus or Pseudomonas aeruginosa detected. Final report.   Final    Gram Stain 05/02/2023 Moderate (3+) WBCs seen   Final    Gram Stain 05/02/2023 Rare (1+) Gram positive cocci in clusters   Final   Admission on  04/20/2023, Discharged on 04/20/2023   Component Date Value Ref Range Status    QT Interval 04/20/2023 352  ms Final    QTC Interval 04/20/2023 435  ms Final    Glucose 04/20/2023 137 (H)  65 - 99 mg/dL Final    BUN 04/20/2023 18  8 - 23 mg/dL Final    Creatinine 04/20/2023 0.99  0.57 - 1.00 mg/dL Final    Sodium 04/20/2023 138  136 - 145 mmol/L Final    Potassium 04/20/2023 3.6  3.5 - 5.2 mmol/L Final    Chloride 04/20/2023 102  98 - 107 mmol/L Final    CO2 04/20/2023 24.8  22.0 - 29.0 mmol/L Final    Calcium 04/20/2023 9.8  8.6 - 10.5 mg/dL Final    Total Protein 04/20/2023 7.1  6.0 - 8.5 g/dL Final    Albumin 04/20/2023 3.7  3.5 - 5.2 g/dL Final    ALT (SGPT) 04/20/2023 16  1 - 33 U/L Final    AST (SGOT) 04/20/2023 35 (H)  1 - 32 U/L Final    Alkaline Phosphatase 04/20/2023 79  39 - 117 U/L Final    Total Bilirubin 04/20/2023 0.6  0.0 - 1.2 mg/dL Final    Globulin 04/20/2023 3.4  gm/dL Final    A/G Ratio 04/20/2023 1.1  g/dL Final    BUN/Creatinine Ratio 04/20/2023 18.2  7.0 - 25.0 Final    Anion Gap 04/20/2023 11.2  5.0 - 15.0 mmol/L Final    eGFR 04/20/2023 59.6 (L)  >60.0 mL/min/1.73 Final    proBNP 04/20/2023 244.6  0.0 - 1,800.0 pg/mL Final    Extra Tube 04/20/2023 Hold for add-ons.   Final    Auto resulted.    Extra Tube 04/20/2023 hold for add-on   Final    Auto resulted    Extra Tube 04/20/2023 Hold for add-ons.   Final    Auto resulted.    Extra Tube 04/20/2023 Hold for add-ons.   Final    Auto resulted    WBC 04/20/2023 5.94  3.40 - 10.80 10*3/mm3 Final    RBC 04/20/2023 4.91  3.77 - 5.28 10*6/mm3 Final    Hemoglobin 04/20/2023 13.1  12.0 - 15.9 g/dL Final    Hematocrit 04/20/2023 40.2  34.0 - 46.6 % Final    MCV 04/20/2023 81.9  79.0 - 97.0 fL Final    MCH 04/20/2023 26.7  26.6 - 33.0 pg Final    MCHC 04/20/2023 32.6  31.5 - 35.7 g/dL Final    RDW 04/20/2023 15.1  12.3 - 15.4 % Final    RDW-SD 04/20/2023 45.1  37.0 - 54.0 fl Final    MPV 04/20/2023 9.7  6.0 - 12.0 fL Final    Platelets 04/20/2023 174   140 - 450 10*3/mm3 Final    Neutrophil % 04/20/2023 66.2  42.7 - 76.0 % Final    Lymphocyte % 04/20/2023 13.5 (L)  19.6 - 45.3 % Final    Monocyte % 04/20/2023 16.2 (H)  5.0 - 12.0 % Final    Eosinophil % 04/20/2023 2.9  0.3 - 6.2 % Final    Basophil % 04/20/2023 0.5  0.0 - 1.5 % Final    Immature Grans % 04/20/2023 0.7 (H)  0.0 - 0.5 % Final    Neutrophils, Absolute 04/20/2023 3.94  1.70 - 7.00 10*3/mm3 Final    Lymphocytes, Absolute 04/20/2023 0.80  0.70 - 3.10 10*3/mm3 Final    Monocytes, Absolute 04/20/2023 0.96 (H)  0.10 - 0.90 10*3/mm3 Final    Eosinophils, Absolute 04/20/2023 0.17  0.00 - 0.40 10*3/mm3 Final    Basophils, Absolute 04/20/2023 0.03  0.00 - 0.20 10*3/mm3 Final    Immature Grans, Absolute 04/20/2023 0.04  0.00 - 0.05 10*3/mm3 Final    nRBC 04/20/2023 0.0  0.0 - 0.2 /100 WBC Final    HS Troponin T 04/20/2023 17 (H)  <10 ng/L Final    HS Troponin T 04/20/2023 15 (H)  <10 ng/L Final    Troponin T Delta 04/20/2023 -2  >=-4 - <+4 ng/L Final      Reviewed previous PFT from 12/2021      Reviewed previous PET/CT scan from 04/11/2023    Narrative & Impression   EXAM:    PET/CT Skull Base to Mid Thigh     EXAM DATE:    4/11/2023 10:13 AM     CLINICAL HISTORY:    r91.8; R91.8-Other nonspecific abnormal finding of lung field     TECHNIQUE:    Axial Positron Emission Tomography / Computed Tomography images were  obtained from skull base to mid thigh following the intravenous  administration of F-18 FDG. MIP reconstructed images were reviewed.     COMPARISON:    No relevant prior studies available.     FINDINGS:      HEAD:    Brain:  Unremarkable as visualized.    Nasopharynx:  Unremarkable.    Dental:  Extensive FDG hypermetabolism localizing to the anterior  maxilla which could BE due to inflammation in the setting of dental  disease or could represent FDG hypermetabolic bone metastasis. Maximum  SUV: 10.2.      NECK:    Hypopharynx:  Unremarkable.    Larynx:  Unremarkable.    Trachea:  Unremarkable.     Retropharyngeal space:  Unremarkable.    Submandibular/parotid glands:  Unremarkable.  Glands are normal in  size.    Thyroid:  Unremarkable.  No enlarged or calcified nodules.      CHEST:    Lungs:  Right middle lobe pulmonary nodule is 26.6 x 24.2 mm and  appears to attach to the major fissure as well as the pleural at the  periphery and demonstrates FDG hypermetabolism in the range of  malignancy. Maximum SUV: 13.8.    Pleural space:  Unremarkable.  No significant effusion.  No  pneumothorax.    Heart:  Cardiomegaly with moderate coronary artery calcifications.  No  significant pericardial effusion.    Mediastinum:  Unremarkable.  No mass.      ABDOMEN:    Liver:  Fatty liver.    Gallbladder and bile ducts:  Cholecystectomy.  No ductal dilation.    Pancreas:  Unremarkable.  No ductal dilation.    Spleen:  Unremarkable.  No splenomegaly.    Adrenals:  Unremarkable.  No mass.    Kidneys and ureters:  Unremarkable.    Stomach and bowel:  Diverticulosis without diverticulitis.  No  obstruction.      PELVIS:    Appendix:  No findings to suggest acute appendicitis.    Bladder:  Unremarkable.    Reproductive:  Unremarkable as visualized.      WHOLE BODY:    Intraperitoneal space:  Unremarkable.  No significant fluid  collection.  No free air.    Bones/joints:  FDG hypermetabolism in association of a right lateral  fifth rib lesion with pathologic fracture with maximum SUV: 8.6.  FDG  hypermetabolism in association with lytic bone lesion of the right  posterior seventh rib with maximum SUV: 9.8.  FDG hypermetabolism in  association with lytic bone lesion of the right iliac wing with maximum  SUV: 8.7.  No dislocation.    Soft tissues:  Unremarkable.    Vasculature:  Atherosclerosis thoracic and abdominal aorta.  No aortic  aneurysm.    Lymph nodes:  FDG hypermetabolism localizing to the right hilum most  consistent with hypermetabolic adenopathy with maximum SUV: 6.6.   Nonenlarged upper cervical lymph nodes show  low-grade FDG  hypermetabolism that approach the range of malignancy but are  predominantly in the range of inflammation with maximum SUV: 3.4, 3.2.  Right cervical lymph node is 1.1 cm. Left cervical or facial lymph node  is 0.7 cm.     IMPRESSION:  1.  Right middle lobe pulmonary nodule is 26.6 x 24.2 mm and appears to  attach to the major fissure as well as the pleura at the periphery and  demonstrates FDG hypermetabolism in the range of malignancy. Maximum  SUV: 13.8.  2.  FDG hypermetabolism localizing to the right hilum most consistent  with hypermetabolic adenopathy with maximum SUV: 6.6.  3.  Extensive FDG hypermetabolism localizing to the anterior maxilla  which could BE due to inflammation in the setting of dental disease or  could represent FDG hypermetabolic bone metastasis. Maximum SUV: 10.2.  4.  FDG hypermetabolism in association of a right lateral fifth rib  lesion with pathologic fracture with maximum SUV: 8.6.  5.  FDG hypermetabolism in association with lytic bone lesion of the  right posterior seventh rib with maximum SUV: 9.8.  6.  FDG hypermetabolism in association with lytic bone lesion of the  right iliac wing with maximum SUV: 8.7.  7.  Nonenlarged upper cervical lymph nodes show low-grade FDG  hypermetabolism that approach the range of malignancy but are  predominantly in the range of inflammation with maximum SUV: 3.4, 3.2.  Right cervical lymph node is 1.1 cm. Left cervical or facial lymph node  is 0.7 cm.  8. Other incidental and nonacute findings as above.     This report was finalized on 4/11/2023 11:53 AM by Dr. Maico Mancera MD.          Reviewed bronchoscopy cultures from 05/02/2023      Reviewed previous pathology results from 05/02/2023 and 05/19/2023            Assessment / Plan         Assessment   Diagnoses and all orders for this visit:    1. Mass of middle lobe of right lung (Primary)  2. Former smoker  Previous PET/CT imaging reviewed and noted above concerning for  malignancy with metastasis.   Has underwent 2 biopsies including bronchoscopy with biopsy and CT needle guided biopsy and both pathology reports noted above were negative for malignancy but concerns for inadequate sample.   Previous bronchoscopy fungal cultures were positive for multiple different fungi including penicillium, candida albicans, and cladosporium. Follows with infectious disease for further evaluation. Fungal antibodies, fungitell, aspergillus galactomannan were negative. Scheduled to follow-up with ID following navigational bronchoscopy this month.   Had appointment with CT surgery 06/07/2023 and was recommended to have navigational bronchoscopy with biopsy on 06/28/2023.    3. Simple chronic bronchitis  Continue albuterol inhaler 2 puffs every 4-6 hours as needed.   Continue Symbicort inhaler 2 puffs twice daily.     4. Chronic respiratory failure with hypoxia  5. Obesity (BMI 30-39.9)  Compliant with supplemental oxygen use at night, with exertion, and as needed.   Tolerating room air during today's visit.     I spent 30 minutes caring for Caprice on this date of service. This time includes time spent by me in the following activities:preparing for the visit, reviewing tests, obtaining and/or reviewing a separately obtained history, performing a medically appropriate examination and/or evaluation , counseling and educating the patient/family/caregiver, ordering medications, tests, or procedures, documenting information in the medical record and independently interpreting results and communicating that information with the patient/family/caregiver.    Follow Up   Return in about 4 weeks (around 7/11/2023), or if symptoms worsen or fail to improve, for Next scheduled follow up.    Patient was given instructions and counseling regarding her condition or for health maintenance advice. Please see specific information pulled into the AVS if appropriate.       This document has been electronically signed by  Joanna Peralta PA-C   June 19, 2023 08:53 EDT    Dictated Utilizing Dragon Dictation: Part of this note may be an electronic transcription/translation of spoken language to printed text using the Dragon Dictation System.

## 2023-07-11 ENCOUNTER — TELEPHONE (OUTPATIENT)
Dept: CARDIAC SURGERY | Facility: CLINIC | Age: 73
End: 2023-07-11

## 2023-07-11 NOTE — TELEPHONE ENCOUNTER
"  Caller: Caprice Rodriguez \"Danay\"    Relationship to patient: Self    Best call back number: [phone]    Chief complaint: PATIENT CALLED [facility] STATING THAT SHE CANNOT MAKE THE [place] LOCATION. PATIENT PREFERS [place] LOCATION FOR APPOINTMENTS.     Type of visit: FOLLOW UP     Requested date: [place] LOCATION FOR THE MONTH OF JULY .    If rescheduling, when is the original appointment: 7.18.23    Additional notes: [facility] HAD NO AVAILABILITY.     "

## 2023-07-23 ENCOUNTER — HOSPITAL ENCOUNTER (EMERGENCY)
Facility: HOSPITAL | Age: 73
Discharge: HOME | End: 2023-07-23
Attending: EMERGENCY MEDICINE | Admitting: EMERGENCY MEDICINE
Payer: MEDICARE

## 2023-07-23 ENCOUNTER — APPOINTMENT (OUTPATIENT)
Dept: GENERAL RADIOLOGY | Facility: HOSPITAL | Age: 73
End: 2023-07-23
Payer: MEDICARE

## 2023-07-23 VITALS
TEMPERATURE: 97.8 F | SYSTOLIC BLOOD PRESSURE: 114 MMHG | HEIGHT: 63 IN | WEIGHT: 190 LBS | OXYGEN SATURATION: 99 % | RESPIRATION RATE: 17 BRPM | HEART RATE: 68 BPM | DIASTOLIC BLOOD PRESSURE: 74 MMHG | BODY MASS INDEX: 33.66 KG/M2

## 2023-07-23 DIAGNOSIS — R06.00 DYSPNEA, UNSPECIFIED TYPE: Primary | ICD-10-CM

## 2023-07-23 LAB
ALBUMIN SERPL-MCNC: 3.6 G/DL (ref 3.5–5.2)
ALBUMIN/GLOB SERPL: 1 G/DL
ALP SERPL-CCNC: 103 U/L (ref 39–117)
ALT SERPL W P-5'-P-CCNC: 9 U/L (ref 1–33)
ANION GAP SERPL CALCULATED.3IONS-SCNC: 13.3 MMOL/L (ref 5–15)
AST SERPL-CCNC: 19 U/L (ref 1–32)
BASOPHILS # BLD AUTO: 0.05 10*3/MM3 (ref 0–0.2)
BASOPHILS NFR BLD AUTO: 0.6 % (ref 0–1.5)
BILIRUB SERPL-MCNC: 1.2 MG/DL (ref 0–1.2)
BUN SERPL-MCNC: 32 MG/DL (ref 8–23)
BUN/CREAT SERPL: 22.1 (ref 7–25)
CALCIUM SPEC-SCNC: 9.8 MG/DL (ref 8.6–10.5)
CHLORIDE SERPL-SCNC: 98 MMOL/L (ref 98–107)
CO2 SERPL-SCNC: 24.7 MMOL/L (ref 22–29)
CREAT SERPL-MCNC: 1.45 MG/DL (ref 0.57–1)
D DIMER PPP FEU-MCNC: 5.87 MCGFEU/ML (ref 0–0.75)
DEPRECATED RDW RBC AUTO: 45.2 FL (ref 37–54)
EGFRCR SERPLBLD CKD-EPI 2021: 37.7 ML/MIN/1.73
EOSINOPHIL # BLD AUTO: 0.09 10*3/MM3 (ref 0–0.4)
EOSINOPHIL NFR BLD AUTO: 1 % (ref 0.3–6.2)
ERYTHROCYTE [DISTWIDTH] IN BLOOD BY AUTOMATED COUNT: 14.9 % (ref 12.3–15.4)
GEN 5 2HR TROPONIN T REFLEX: 21 NG/L
GLOBULIN UR ELPH-MCNC: 3.7 GM/DL
GLUCOSE SERPL-MCNC: 94 MG/DL (ref 65–99)
HCT VFR BLD AUTO: 40.2 % (ref 34–46.6)
HGB BLD-MCNC: 12.9 G/DL (ref 12–15.9)
HOLD SPECIMEN: NORMAL
HOLD SPECIMEN: NORMAL
IMM GRANULOCYTES # BLD AUTO: 0.04 10*3/MM3 (ref 0–0.05)
IMM GRANULOCYTES NFR BLD AUTO: 0.5 % (ref 0–0.5)
LYMPHOCYTES # BLD AUTO: 1.57 10*3/MM3 (ref 0.7–3.1)
LYMPHOCYTES NFR BLD AUTO: 18 % (ref 19.6–45.3)
MCH RBC QN AUTO: 26.8 PG (ref 26.6–33)
MCHC RBC AUTO-ENTMCNC: 32.1 G/DL (ref 31.5–35.7)
MCV RBC AUTO: 83.4 FL (ref 79–97)
MONOCYTES # BLD AUTO: 0.99 10*3/MM3 (ref 0.1–0.9)
MONOCYTES NFR BLD AUTO: 11.4 % (ref 5–12)
NEUTROPHILS NFR BLD AUTO: 5.97 10*3/MM3 (ref 1.7–7)
NEUTROPHILS NFR BLD AUTO: 68.5 % (ref 42.7–76)
NRBC BLD AUTO-RTO: 0 /100 WBC (ref 0–0.2)
NT-PROBNP SERPL-MCNC: 227.5 PG/ML (ref 0–1800)
PLATELET # BLD AUTO: 246 10*3/MM3 (ref 140–450)
PMV BLD AUTO: 9.8 FL (ref 6–12)
POTASSIUM SERPL-SCNC: 3.7 MMOL/L (ref 3.5–5.2)
PROT SERPL-MCNC: 7.3 G/DL (ref 6–8.5)
RBC # BLD AUTO: 4.82 10*6/MM3 (ref 3.77–5.28)
SODIUM SERPL-SCNC: 136 MMOL/L (ref 136–145)
TROPONIN T DELTA: -5 NG/L
TROPONIN T SERPL HS-MCNC: 26 NG/L
WBC NRBC COR # BLD: 8.71 10*3/MM3 (ref 3.4–10.8)
WHOLE BLOOD HOLD COAG: NORMAL
WHOLE BLOOD HOLD SPECIMEN: NORMAL

## 2023-07-23 PROCEDURE — 84484 ASSAY OF TROPONIN QUANT: CPT | Performed by: EMERGENCY MEDICINE

## 2023-07-23 PROCEDURE — 36415 COLL VENOUS BLD VENIPUNCTURE: CPT

## 2023-07-23 PROCEDURE — 85379 FIBRIN DEGRADATION QUANT: CPT | Performed by: EMERGENCY MEDICINE

## 2023-07-23 PROCEDURE — 83880 ASSAY OF NATRIURETIC PEPTIDE: CPT | Performed by: EMERGENCY MEDICINE

## 2023-07-23 PROCEDURE — 99284 EMERGENCY DEPT VISIT MOD MDM: CPT

## 2023-07-23 PROCEDURE — 85025 COMPLETE CBC W/AUTO DIFF WBC: CPT | Performed by: EMERGENCY MEDICINE

## 2023-07-23 PROCEDURE — 71045 X-RAY EXAM CHEST 1 VIEW: CPT

## 2023-07-23 PROCEDURE — 80053 COMPREHEN METABOLIC PANEL: CPT | Performed by: EMERGENCY MEDICINE

## 2023-07-23 RX ORDER — SODIUM CHLORIDE 0.9 % (FLUSH) 0.9 %
10 SYRINGE (ML) INJECTION AS NEEDED
Status: DISCONTINUED | OUTPATIENT
Start: 2023-07-23 | End: 2023-07-23 | Stop reason: HOSPADM

## 2023-07-23 NOTE — ED PROVIDER NOTES
Subjective   History of Present Illness  73-year-old female with COPD reports to the ED with shortness of breath for 1 month.  Patient states today the shortness of breath is somewhat worse.  She states he went to see her doctor and that he could not get a blood pressure and told her to go to the ER.  She has COPD and she is on home oxygen as needed.  She denies any fever chills cough nausea vomiting.  Headache neck pain earache sore throat or cold symptoms.    She states that she has been having hip pain and on Friday her doctor called in some hydrocodone for her and she started taking hydrocodone on Saturday after which she started vomiting bilious fluids.  She states after taking hydrocodone she felt like her tongue was swollen.  She denies any vomiting today.    Review of Systems    Past Medical History:   Diagnosis Date    Arthritis     Chest pain     COPD (chronic obstructive pulmonary disease)     Elevated cholesterol     GERD (gastroesophageal reflux disease)     Hyperlipidemia     Hypertension     Hypokalemia     Hypothyroidism     Pericardial effusion, trivial        No Known Allergies    Past Surgical History:   Procedure Laterality Date    APPENDECTOMY      BREAST BIOPSY Left     BRONCHOSCOPY Bilateral 05/02/2023    Procedure: BRONCHOSCOPY WITH ENDOBRONCHIAL ULTRASOUND;  Surgeon: Juan Henry MD;  Location:  [facility] OR;  Service: Pulmonary;  Laterality: Bilateral;    BRONCHOSCOPY WITH ION ROBOTIC ASSIST N/A 6/28/2023    Procedure: NAVIGATIONAL BRONCHOSCOPY WITH ION ROBOT;  Surgeon: Royal Smith MD;  Location: [facility] ENDOSCOPY;  Service: Robotics - Pulmonary;  Laterality: N/A;    CARDIAC CATHETERIZATION  2008    CARDIOVASCULAR STRESS TEST  09/2014    CATARACT EXTRACTION, BILATERAL Bilateral     2015 right eye, Dr Hauser  and  2/2017 left eye Dr Meyer    COLONOSCOPY      ECHO - CONVERTED  09/2014    ENDOSCOPY      GALLBLADDER SURGERY      HERNIA REPAIR      TUBAL ABDOMINAL LIGATION          Family History   Problem Relation Age of Onset    Heart disease Mother     Diabetes Mother     Heart attack Mother         cause of death    Cirrhosis Father         cause of death    Cancer Sister         liver ca    Cancer Brother         lung ca    Cancer Brother         colon ca    Heart disease Brother     Heart failure Brother     Cancer Sister         lung ca    Heart failure Sister     Cancer Sister        Social History     Socioeconomic History    Marital status:     Number of children: 1   Tobacco Use    Smoking status: Former     Packs/day: 2.00     Types: Cigarettes     Quit date:      Years since quittin.5     Passive exposure: Past    Smokeless tobacco: Never   Vaping Use    Vaping Use: Never used   Substance and Sexual Activity    Alcohol use: Not Currently     Alcohol/week: 1.0 standard drink     Types: 1 Cans of beer per week     Comment: occasional fire ball for sickness    Drug use: No    Sexual activity: Defer           Objective   Physical Exam  Vitals and nursing note reviewed.   Constitutional:       Comments: Alert oriented in no acute distress    HEENT is normocephalic and atraumatic, extraocular muscles are intact, oropharynx is clear without exudates, oral mucous membranes are moist, there is no tongue swelling noted.    Lungs are clear to auscultation bilaterally without wheezes rales or rhonchi    Cardiac exam is regular rate rhythm normal S1-S2 no murmur gallops or rubs    Abdomen soft nontender nondistended without guarding or rebound tenderness    Extremities are without clubbing cyanosis edema or deformity    Neurologic exam patient is alert oriented with normal mentation, there is no facial asymmetry extraocular muscles are intact         Results for orders placed or performed during the hospital encounter of 23   Comprehensive Metabolic Panel    Specimen: Blood   Result Value Ref Range    Glucose 94 65 - 99 mg/dL    BUN 32 (H) 8 - 23 mg/dL     Creatinine 1.45 (H) 0.57 - 1.00 mg/dL    Sodium 136 136 - 145 mmol/L    Potassium 3.7 3.5 - 5.2 mmol/L    Chloride 98 98 - 107 mmol/L    CO2 24.7 22.0 - 29.0 mmol/L    Calcium 9.8 8.6 - 10.5 mg/dL    Total Protein 7.3 6.0 - 8.5 g/dL    Albumin 3.6 3.5 - 5.2 g/dL    ALT (SGPT) 9 1 - 33 U/L    AST (SGOT) 19 1 - 32 U/L    Alkaline Phosphatase 103 39 - 117 U/L    Total Bilirubin 1.2 0.0 - 1.2 mg/dL    Globulin 3.7 gm/dL    A/G Ratio 1.0 g/dL    BUN/Creatinine Ratio 22.1 7.0 - 25.0    Anion Gap 13.3 5.0 - 15.0 mmol/L    eGFR 37.7 (L) >60.0 mL/min/1.73   BNP    Specimen: Blood   Result Value Ref Range    proBNP 227.5 0.0 - 1,800.0 pg/mL   D-dimer, Quantitative    Specimen: Blood   Result Value Ref Range    D-Dimer, Quantitative 5.87 (H) 0.00 - 0.75 MCGFEU/mL   High Sensitivity Troponin T    Specimen: Blood   Result Value Ref Range    HS Troponin T 26 (H) <10 ng/L   CBC Auto Differential    Specimen: Blood   Result Value Ref Range    WBC 8.71 3.40 - 10.80 10*3/mm3    RBC 4.82 3.77 - 5.28 10*6/mm3    Hemoglobin 12.9 12.0 - 15.9 g/dL    Hematocrit 40.2 34.0 - 46.6 %    MCV 83.4 79.0 - 97.0 fL    MCH 26.8 26.6 - 33.0 pg    MCHC 32.1 31.5 - 35.7 g/dL    RDW 14.9 12.3 - 15.4 %    RDW-SD 45.2 37.0 - 54.0 fl    MPV 9.8 6.0 - 12.0 fL    Platelets 246 140 - 450 10*3/mm3    Neutrophil % 68.5 42.7 - 76.0 %    Lymphocyte % 18.0 (L) 19.6 - 45.3 %    Monocyte % 11.4 5.0 - 12.0 %    Eosinophil % 1.0 0.3 - 6.2 %    Basophil % 0.6 0.0 - 1.5 %    Immature Grans % 0.5 0.0 - 0.5 %    Neutrophils, Absolute 5.97 1.70 - 7.00 10*3/mm3    Lymphocytes, Absolute 1.57 0.70 - 3.10 10*3/mm3    Monocytes, Absolute 0.99 (H) 0.10 - 0.90 10*3/mm3    Eosinophils, Absolute 0.09 0.00 - 0.40 10*3/mm3    Basophils, Absolute 0.05 0.00 - 0.20 10*3/mm3    Immature Grans, Absolute 0.04 0.00 - 0.05 10*3/mm3    nRBC 0.0 0.0 - 0.2 /100 WBC   High Sensitivity Troponin T 2Hr    Specimen: Arm, Right; Blood   Result Value Ref Range    HS Troponin T 21 (H) <10 ng/L     Troponin T Delta -5 (L) >=-4 - <+4 ng/L   Green Top (Gel)   Result Value Ref Range    Extra Tube Hold for add-ons.    Lavender Top   Result Value Ref Range    Extra Tube hold for add-on    Gold Top - SST   Result Value Ref Range    Extra Tube Hold for add-ons.    Light Blue Top   Result Value Ref Range    Extra Tube Hold for add-ons.       Procedures           ED Course  ED Course as of 07/23/23 1944   Sun Jul 23, 2023   1554 EKG dated July 23 at 1549 hrs. shows normal sinus rhythm with normal axis intervals no STEMI [JY]   1827 Creatinine(!): 1.45 [JY]   1827 BUN(!): 32 [JY]   1902 XR Chest 1 View  60 is clear without effusions and infiltrates [JY]      ED Course User Index  [JY] Virgil Clements MD                                           Medical Decision Making  73-year-old female with COPD and anxiety presents with difficulty breathing.  Patient's vitals are normal chest x-ray is clear lungs are clear EKG is without acute STEMI.  Patient was discharged in stable condition    Amount and/or Complexity of Data Reviewed  Labs: ordered.  Radiology: ordered.  ECG/medicine tests: ordered.    Risk  Prescription drug management.        Final diagnoses:   Dyspnea, unspecified type       ED Disposition  ED Disposition       ED Disposition   Discharge    Condition   Stable    Comment   --               No follow-up provider specified.       Medication List      No changes were made to your prescriptions during this visit.            Virgil Clements MD  07/23/23 1944

## 2023-08-02 ENCOUNTER — HOSPITAL ENCOUNTER (OUTPATIENT)
Dept: MRI IMAGING | Facility: HOSPITAL | Age: 73
Discharge: HOME | End: 2023-08-02
Admitting: INTERNAL MEDICINE
Payer: MEDICARE

## 2023-08-02 DIAGNOSIS — Z87.891 FORMER SMOKER: ICD-10-CM

## 2023-08-02 DIAGNOSIS — R91.8 MASS OF MIDDLE LOBE OF RIGHT LUNG: ICD-10-CM

## 2023-08-02 PROCEDURE — 70551 MRI BRAIN STEM W/O DYE: CPT

## 2023-08-02 PROCEDURE — 70551 MRI BRAIN STEM W/O DYE: CPT | Performed by: RADIOLOGY

## 2023-08-04 ENCOUNTER — TELEPHONE (OUTPATIENT)
Dept: ONCOLOGY | Facility: CLINIC | Age: 73
End: 2023-08-04
Payer: MEDICARE

## 2023-08-04 RX ORDER — AMOXICILLIN 250 MG
2 CAPSULE ORAL 2 TIMES DAILY PRN
Qty: 120 TABLET | Refills: 2 | Status: SHIPPED | OUTPATIENT
Start: 2023-08-04

## 2023-08-04 RX ORDER — POLYETHYLENE GLYCOL 3350 17 G/17G
17 POWDER, FOR SOLUTION ORAL DAILY PRN
Qty: 510 G | Refills: 2 | Status: SHIPPED | OUTPATIENT
Start: 2023-08-04

## 2023-08-09 DIAGNOSIS — C79.51 NON-SMALL CELL LUNG CANCER METASTATIC TO BONE: Primary | ICD-10-CM

## 2023-08-09 DIAGNOSIS — C34.90 NON-SMALL CELL LUNG CANCER METASTATIC TO BONE: Primary | ICD-10-CM

## 2023-08-10 ENCOUNTER — LAB (OUTPATIENT)
Dept: ONCOLOGY | Facility: CLINIC | Age: 73
End: 2023-08-10
Payer: MEDICARE

## 2023-08-10 ENCOUNTER — OFFICE VISIT (OUTPATIENT)
Dept: ONCOLOGY | Facility: CLINIC | Age: 73
End: 2023-08-10
Payer: MEDICARE

## 2023-08-10 VITALS
DIASTOLIC BLOOD PRESSURE: 78 MMHG | RESPIRATION RATE: 18 BRPM | HEART RATE: 77 BPM | SYSTOLIC BLOOD PRESSURE: 121 MMHG | OXYGEN SATURATION: 96 % | WEIGHT: 189 LBS | HEIGHT: 63 IN | BODY MASS INDEX: 33.49 KG/M2 | TEMPERATURE: 96.9 F

## 2023-08-10 DIAGNOSIS — C79.51 NON-SMALL CELL LUNG CANCER METASTATIC TO BONE: ICD-10-CM

## 2023-08-10 DIAGNOSIS — C34.90 NON-SMALL CELL LUNG CANCER METASTATIC TO BONE: Primary | ICD-10-CM

## 2023-08-10 DIAGNOSIS — C34.90 NON-SMALL CELL LUNG CANCER METASTATIC TO BONE: ICD-10-CM

## 2023-08-10 DIAGNOSIS — C79.51 NON-SMALL CELL LUNG CANCER METASTATIC TO BONE: Primary | ICD-10-CM

## 2023-08-10 LAB
ALBUMIN SERPL-MCNC: 3.8 G/DL (ref 3.5–5.2)
ALBUMIN/GLOB SERPL: 1.2 G/DL
ALP SERPL-CCNC: 95 U/L (ref 39–117)
ALT SERPL W P-5'-P-CCNC: 9 U/L (ref 1–33)
ANION GAP SERPL CALCULATED.3IONS-SCNC: 10.9 MMOL/L (ref 5–15)
AST SERPL-CCNC: 21 U/L (ref 1–32)
BASOPHILS # BLD AUTO: 0.05 10*3/MM3 (ref 0–0.2)
BASOPHILS NFR BLD AUTO: 0.7 % (ref 0–1.5)
BILIRUB SERPL-MCNC: 0.4 MG/DL (ref 0–1.2)
BUN SERPL-MCNC: 16 MG/DL (ref 8–23)
BUN/CREAT SERPL: 15 (ref 7–25)
CALCIUM SPEC-SCNC: 10 MG/DL (ref 8.6–10.5)
CHLORIDE SERPL-SCNC: 102 MMOL/L (ref 98–107)
CO2 SERPL-SCNC: 29.1 MMOL/L (ref 22–29)
CREAT SERPL-MCNC: 1.07 MG/DL (ref 0.57–1)
DEPRECATED RDW RBC AUTO: 46.8 FL (ref 37–54)
EGFRCR SERPLBLD CKD-EPI 2021: 54.3 ML/MIN/1.73
EOSINOPHIL # BLD AUTO: 0.24 10*3/MM3 (ref 0–0.4)
EOSINOPHIL NFR BLD AUTO: 3.2 % (ref 0.3–6.2)
ERYTHROCYTE [DISTWIDTH] IN BLOOD BY AUTOMATED COUNT: 15.1 % (ref 12.3–15.4)
GLOBULIN UR ELPH-MCNC: 3.2 GM/DL
GLUCOSE SERPL-MCNC: 108 MG/DL (ref 65–99)
HCT VFR BLD AUTO: 38.2 % (ref 34–46.6)
HGB BLD-MCNC: 11.7 G/DL (ref 12–15.9)
IMM GRANULOCYTES # BLD AUTO: 0.04 10*3/MM3 (ref 0–0.05)
IMM GRANULOCYTES NFR BLD AUTO: 0.5 % (ref 0–0.5)
LYMPHOCYTES # BLD AUTO: 1.48 10*3/MM3 (ref 0.7–3.1)
LYMPHOCYTES NFR BLD AUTO: 19.9 % (ref 19.6–45.3)
MCH RBC QN AUTO: 26.3 PG (ref 26.6–33)
MCHC RBC AUTO-ENTMCNC: 30.6 G/DL (ref 31.5–35.7)
MCV RBC AUTO: 85.8 FL (ref 79–97)
MONOCYTES # BLD AUTO: 0.77 10*3/MM3 (ref 0.1–0.9)
MONOCYTES NFR BLD AUTO: 10.3 % (ref 5–12)
NEUTROPHILS NFR BLD AUTO: 4.86 10*3/MM3 (ref 1.7–7)
NEUTROPHILS NFR BLD AUTO: 65.4 % (ref 42.7–76)
NRBC BLD AUTO-RTO: 0 /100 WBC (ref 0–0.2)
PLATELET # BLD AUTO: 228 10*3/MM3 (ref 140–450)
PMV BLD AUTO: 8.8 FL (ref 6–12)
POTASSIUM SERPL-SCNC: 3.6 MMOL/L (ref 3.5–5.2)
PROT SERPL-MCNC: 7 G/DL (ref 6–8.5)
RBC # BLD AUTO: 4.45 10*6/MM3 (ref 3.77–5.28)
SODIUM SERPL-SCNC: 142 MMOL/L (ref 136–145)
WBC NRBC COR # BLD: 7.44 10*3/MM3 (ref 3.4–10.8)

## 2023-08-10 PROCEDURE — 80053 COMPREHEN METABOLIC PANEL: CPT | Performed by: INTERNAL MEDICINE

## 2023-08-10 PROCEDURE — 85025 COMPLETE CBC W/AUTO DIFF WBC: CPT | Performed by: INTERNAL MEDICINE

## 2023-08-10 RX ORDER — DIAPER,BRIEF,INFANT-TODD,DISP
1 EACH MISCELLANEOUS 2 TIMES DAILY
Qty: 1 EACH | Refills: 3 | Status: SHIPPED | OUTPATIENT
Start: 2023-08-10

## 2023-08-10 NOTE — PROGRESS NOTES
Venipuncture Blood Specimen Collection  Venipuncture performed in left arm by Alejandra Marx MA with good hemostasis. Patient tolerated the procedure well without complications.    08/10/23   Alejandra Marx MA

## 2023-08-10 NOTE — PROGRESS NOTES
Subjective     Date: 8/10/2023    Referring Provider  No ref. 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 female who presents today to [facility] for follow up.     HPI:   73-year-old female with history of hypertension, hyperlipidemia, obesity, remote tobacco use, COPD who presents for consultation regarding new diagnosis of lung cancer.       Oncology history:  April 11 2023 PET/CT shows right middle lobe pulmonary nodule 26.6 x 24.2 mm, appears to attach to the major fissure as well as pleura at the periphery.  mSUV 13.8.  FDG hypermetabolism right hilum, consistent with hypermetabolic adenopathy SUV 6.6.  Extensive FDG hypermetabolism localized to anterior maxilla, SUV 10.2.  FDG hypermetabolism right lateral fifth rib SUV 8.6.  FDG hypermetabolism with lytic bone lesion of right posterior seventh rib SUV 9.8, FDG hypermetabolism with lytic bone lesion of right iliac wing SUV 8.7.  Not enlarged upper cervical lymph nodes show low-grade FDG.  May 2, 2023: Bronchoscopy with BAL and wash by Dr. Henry negative for malignant cells.  Lymph node FNA station 7 negative for malignant cells.  May 19, 2023: Underwent CT-guided needle biopsy of the left lung which showed benign alveolar lung parenchyma with hemorrhage, focal fibrosis, dust pigment negative for dysplasia or malignancy.  June 28, 2023: Navigational bronchoscopy was performed by Dr. Smith.  For which pathology from the right middle lobe lung showed portions of fibrotic lung tissue with limited atypical cells felt to be consistent with carcinoma.  Lymph node station 8R involved by cytokeratin 7 positive carcinoma with nonspecific immunophenotype.  PD-L1 TPS 15%.  June 23, 2023: CT of the chest with large spiculated right lung mass, unchanged from previous imaging.  Bony metastases involving right rib and T11  vertebral body.      Mrs. Rodriguez presents today with her niece.  She reports hitting dresser and hurting her right ribs in April, which initiated the work-up with a chest x-ray concerning for a broken ribs and lung mass.  She denies any other symptoms such as cough or shortness of breath.  She has lost approximately 18 pounds since April 2023.    Over the last month has lived with her grandson, who helps her with ADLs.  Able to ambulate on her own.    Remote history of smoking, 42 years ago, 2 packs/day for approximately 2 to 3 years.  Denies regular alcohol or drug use.  Family history significant for brother, sister with lung cancer.  Another brother with hepatocellular carcinoma.  Another sibling with colon cancer.    Interval History 08/10/2023           The following portions of the patient's history were reviewed and updated as appropriate: allergies, current medications, past family history, past medical history, past social history, past surgical history and problem list.    Objective     Objective     Allergy:   No Known Allergies     Current Medications:   Current Outpatient Medications   Medication Sig Dispense Refill    albuterol (ACCUNEB) 0.63 MG/3ML nebulizer solution USE 1 VIAL IN NEBULIZER EVERY 6 HOURS      amitriptyline (ELAVIL) 25 MG tablet Take 1 tablet by mouth Daily.      atenolol (TENORMIN) 25 MG tablet Take 1 tablet by mouth Daily.      atorvastatin (LIPITOR) 20 MG tablet Take 1 tablet by mouth Daily.      Budesonide-Formoterol Fumarate (SYMBICORT IN) Inhale 2 puffs 2 (two) times a day as needed.      Calcium Carbonate-Vitamin D (CALCIUM 600+D3 PO) Take  by mouth.      Cholecalciferol (Vitamin D3) 1.25 MG (20175 UT) capsule Take 1 capsule by mouth 1 (One) Time Per Week.      HYDROcodone-acetaminophen (NORCO) 5-325 MG per tablet Take 1 tablet by mouth Every 8 (Eight) Hours As Needed for Moderate Pain. 30 tablet 0    levothyroxine (SYNTHROID, LEVOTHROID) 112 MCG tablet Take 1 tablet by mouth  Daily.      lisinopril (PRINIVIL,ZESTRIL) 10 MG tablet Take 1 tablet by mouth Every Night.      lisinopril-hydrochlorothiazide (PRINZIDE,ZESTORETIC) 10-12.5 MG per tablet Take 1 tablet by mouth Every Morning.      meloxicam (MOBIC) 7.5 MG tablet Take 1 tablet by mouth Daily.      nitroglycerin (NITROSTAT) 0.4 MG SL tablet       omeprazole (PriLOSEC) 20 MG capsule Take 1 capsule by mouth 2 (Two) Times a Day.      ondansetron (ZOFRAN) 8 MG tablet Take 1 tablet by mouth 3 (Three) Times a Day As Needed for Nausea or Vomiting. 30 tablet 5    polyethylene glycol (MiraLax) 17 GM/SCOOP powder Take 17 g by mouth Daily As Needed (Constipation). 510 g 2    potassium chloride (K-DUR) 10 MEQ CR tablet Take 1 tablet by mouth Daily.      prochlorperazine (COMPAZINE) 10 MG tablet Take 1 tablet by mouth Every 6 (Six) Hours As Needed for Nausea or Vomiting. May take with the ondansetron. 60 tablet 3    sennosides-docusate (senna-docusate sodium) 8.6-50 MG per tablet Take 2 tablets by mouth 2 (Two) Times a Day As Needed for Constipation. 120 tablet 2    Sotorasib (Lumakras) 320 MG tablet Take 3 tablets by mouth Daily. 90 tablet 5    tolterodine LA (DETROL LA) 4 MG 24 hr capsule Take 1 capsule by mouth Daily.      vitamin B-12 (CYANOCOBALAMIN) 1000 MCG tablet Take 1 tablet by mouth Daily.      hydrocortisone 1 % cream Apply 1 application  topically to the appropriate area as directed 2 (Two) Times a Day. 1 each 3     No current facility-administered medications for this visit.       Past Medical History:  Past Medical History:   Diagnosis Date    Arthritis     Chest pain     COPD (chronic obstructive pulmonary disease)     Elevated cholesterol     GERD (gastroesophageal reflux disease)     Hyperlipidemia     Hypertension     Hypokalemia     Hypothyroidism     Pericardial effusion, trivial        Past Surgical History:  Past Surgical History:   Procedure Laterality Date    APPENDECTOMY      BREAST BIOPSY Left     BRONCHOSCOPY Bilateral  "2023    Procedure: BRONCHOSCOPY WITH ENDOBRONCHIAL ULTRASOUND;  Surgeon: Juan Henry MD;  Location:  [facility];  Service: Pulmonary;  Laterality: Bilateral;    BRONCHOSCOPY WITH ION ROBOTIC ASSIST N/A 2023    Procedure: NAVIGATIONAL BRONCHOSCOPY WITH ION ROBOT;  Surgeon: Royal Smith MD;  Location:  [facility];  Service: Robotics - Pulmonary;  Laterality: N/A;    CARDIAC CATHETERIZATION      CARDIOVASCULAR STRESS TEST  2014    CATARACT EXTRACTION, BILATERAL Bilateral      right eye, Dr Hauser  and  2017 left eye Dr Meyer    COLONOSCOPY      ECHO - CONVERTED  2014    ENDOSCOPY      GALLBLADDER SURGERY      HERNIA REPAIR      TUBAL ABDOMINAL LIGATION         Social History:  Social History     Socioeconomic History    Marital status:     Number of children: 1   Tobacco Use    Smoking status: Former     Packs/day: 2.00     Types: Cigarettes     Quit date:      Years since quittin.6     Passive exposure: Past    Smokeless tobacco: Never   Vaping Use    Vaping Use: Never used   Substance and Sexual Activity    Alcohol use: Not Currently     Alcohol/week: 1.0 standard drink     Types: 1 Cans of beer per week     Comment: occasional fire ball for sickness    Drug use: No    Sexual activity: Defer         Family History:  Family History   Problem Relation Age of Onset    Heart disease Mother     Diabetes Mother     Heart attack Mother         cause of death    Cirrhosis Father         cause of death    Cancer Sister         liver ca    Cancer Brother         lung ca    Cancer Brother         colon ca    Heart disease Brother     Heart failure Brother     Cancer Sister         lung ca    Heart failure Sister     Cancer Sister        Review of Systems:  Review of Systems   All other systems reviewed and are negative.    Vital Signs:   /78   Pulse 77   Temp 96.9 øF (36.1 øC) (Temporal)   Resp 18   Ht 160 cm (62.99\")   Wt 85.7 kg (189 lb)   SpO2 96%   " "BMI 33.49 kg/mý      Physical Exam:  Physical Exam  Vitals reviewed.   Constitutional:       General: She is not in acute distress.     Appearance: Normal appearance. She is not ill-appearing.   HENT:      Head: Normocephalic and atraumatic.      Mouth/Throat:      Mouth: Mucous membranes are moist.      Pharynx: Oropharynx is clear.   Eyes:      Conjunctiva/sclera: Conjunctivae normal.      Pupils: Pupils are equal, round, and reactive to light.   Cardiovascular:      Rate and Rhythm: Normal rate and regular rhythm.      Heart sounds: No murmur heard.  Pulmonary:      Effort: Pulmonary effort is normal. No respiratory distress.      Breath sounds: Normal breath sounds. No wheezing.   Abdominal:      General: Abdomen is flat. Bowel sounds are normal. There is no distension.      Palpations: Abdomen is soft. There is no mass.      Tenderness: There is no abdominal tenderness. There is no guarding.   Musculoskeletal:         General: No swelling. Normal range of motion.      Cervical back: Normal range of motion.   Lymphadenopathy:      Cervical: No cervical adenopathy.   Skin:     General: Skin is warm and dry.   Neurological:      General: No focal deficit present.      Mental Status: She is alert and oriented to person, place, and time. Mental status is at baseline.   Psychiatric:         Mood and Affect: Mood normal.       PHQ-9 Score  PHQ-9 Total Score:       Pain Score  Vitals:    08/10/23 1251   BP: 121/78   Pulse: 77   Resp: 18   Temp: 96.9 øF (36.1 øC)   TempSrc: Temporal   SpO2: 96%   Weight: 85.7 kg (189 lb)   Height: 160 cm (62.99\")   PainSc: 0-No pain       ECOG score: 0           PAINSCOREQUALITYMETRIC:   Vitals:    08/10/23 1251   PainSc: 0-No pain          Lab Review  Lab Results   Component Value Date    WBC 7.44 08/10/2023    HGB 11.7 (L) 08/10/2023    HCT 38.2 08/10/2023    MCV 85.8 08/10/2023    RDW 15.1 08/10/2023     08/10/2023    NEUTRORELPCT 65.4 08/10/2023    LYMPHORELPCT 19.9 " 08/10/2023    MONORELPCT 10.3 08/10/2023    EOSRELPCT 3.2 08/10/2023    BASORELPCT 0.7 08/10/2023    NEUTROABS 4.86 08/10/2023    LYMPHSABS 1.48 08/10/2023       Lab Results   Component Value Date     08/10/2023    K 3.6 08/10/2023    CO2 29.1 (H) 08/10/2023     08/10/2023    BUN 16 08/10/2023    CREATININE 1.07 (H) 08/10/2023    GLUCOSE 108 (H) 08/10/2023    CALCIUM 10.0 08/10/2023    ALKPHOS 95 08/10/2023    AST 21 08/10/2023    ALT 9 08/10/2023    BILITOT 0.4 08/10/2023    ALBUMIN 3.8 08/10/2023    PROTEINTOT 7.0 08/10/2023       Radiology Results  CT Chest Without Contrast Diagnostic    Result Date: 6/23/2023   1. Appearance concerning for bony metastasis involving right ribs and T11 vertebral body. 2. Large spiculated right lung mass, unchanged. 3. No pneumothorax.    This report was finalized on 6/23/2023 3:11 PM by Dr. Eddie Olsen MD.      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  This report was finalized on 4/13/2023 2:03 PM by Dr. Roland Jenkins MD.      XR Chest 1 View    Result Date: 6/28/2023  Impression: 1. No pneumothorax or pneumomediastinum. 2. Abnormal density in the right lung base corresponding to patient's known lung mass. There is some surrounding airspace disease which may be secondary to the biopsy procedure. 3. Right-sided rib fractures which are pathologic fractures on the outside CT exam. Electronically Signed: Dominic Bailon  6/28/2023 9:30 AM EDT  Workstation ID: [id number]    XR Chest 1 View    Result Date: 4/20/2023  1. Cardiac enlargement. No evidence of CHF. 2. Patient has a known malignant right middle lobe mass and known right-sided rib lesions. Signer Name: Canelo Brand MD  Signed: 4/20/2023 2:00 AM  Workstation Name: [id number]  [facility]    CT Needle Biopsy Lung    Result Date: 5/19/2023  CT-guided needle biopsy right lung.  This report was finalized on 5/19/2023  10:34 AM by Dr. Maico Mancera MD.      NM PET/CT Skull Base to Mid Thigh    Result Date: 4/11/2023  1.  Right middle lobe pulmonary nodule is 26.6 x 24.2 mm and appears to attach to the major fissure as well as the pleura at the periphery and demonstrates FDG hypermetabolism in the range of malignancy. Maximum SUV: 13.8. 2.  FDG hypermetabolism localizing to the right hilum most consistent with hypermetabolic adenopathy with maximum SUV: 6.6. 3.  Extensive FDG hypermetabolism localizing to the anterior maxilla which could BE due to inflammation in the setting of dental disease or could represent FDG hypermetabolic bone metastasis. Maximum SUV: 10.2. 4.  FDG hypermetabolism in association of a right lateral fifth rib lesion with pathologic fracture with maximum SUV: 8.6. 5.  FDG hypermetabolism in association with lytic bone lesion of the right posterior seventh rib with maximum SUV: 9.8. 6.  FDG hypermetabolism in association with lytic bone lesion of the right iliac wing with maximum SUV: 8.7. 7.  Nonenlarged upper cervical lymph nodes show low-grade FDG hypermetabolism that approach the range of malignancy but are predominantly in the range of inflammation with maximum SUV: 3.4, 3.2. Right cervical lymph node is 1.1 cm. Left cervical or facial lymph node is 0.7 cm. 8. Other incidental and nonacute findings as above.  This report was finalized on 4/11/2023 11:53 AM by Dr. Maico Mancera MD.       Pathology:     PATHOLOGY - SCAN - GUARDANT 360/ SUMMARY OF DECTEDCTED SOMATIC ALTERATIONS,IMMUNOTHERAPY BIOMARKERS/7/19/23 (07/19/2023)             Tissue Pathology Exam (06/28/2023 08:15)             Assessment / Plan         Assessment and Plan   Caprice Rodriguez is a 73 y.o. year old who presents for     Non-small cell lung cancer, metastatic to bones (right rib and T11)  -Patient with right-sided pain after a fall, noted to have lung lesion right middle lobe on chest x-ray.  PET/CT April 2023 with 2.6 x 2.4 cm right  middle lobe pulmonary nodule, FDG hypermetabolism of right lateral fifth rib, right posterior seventh rib, right iliac wing  -Initial BAL and lavage (5/02) without malignant cells.  CT-guided biopsy (5/19) negative for dysplasia or malignancy.  Navigational bronchoscopy with tissue pathology (6/28) showed RML with atypical cells consistent with carcinoma and lymph node station 8R (paraesophageal) positive for carcinoma, favored adenocarcinoma.  PD-L1 TPS 15%  -CT of the chest repeated 6/23 shows bony metastasis involving right ribs and T11 vertebral body.  - Given she has presumed mid metastatic lesion to her ribs and spine, she qualifies as stage IV lung cancer.  We discussed treatment which would be palliative and include chemotherapy and immunotherapy, which patient declined.  She reports having seen her sister and brother go through chemotherapy, does not want therapy that could cause her to become weaker or worse.    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  We were able to get Sotorasib 960 mg ordered for her as first line.  She started medication August 2.  Thus far, notes increased fatigue and constipation with medication.  -We discussed sotorasib has only shown progression free survival of 6.8 months and overall survival of 12.5 months in second line treatment option in patients with K-vaughn G12 C mutation with advanced non-small cell lung cancer.  -MRI brain negative for metastases  -Check LFTs today and again in 3 weeks    2.  Malignancy associated pain  -Currently taking Norco 5-325mg as needed for pain    3.  Nutrition  -Has a good appetite throughout the day    4.  Constipation  -Taking senna docusate twice daily and MiraLAX daily as needed.  Recommend discontinuing if she develops diarrhea    5.  Pruritus of the skin  -Ordered for hydrocortisone 1% cream to apply topical areas of pruritus      Discussed possible differential diagnoses, testing, treatment, recommended non-pharmacological  interventions, risks, warning signs to monitor for that would indicate need for follow-up in clinic or ER. If no improvement with these regimens or you have new or worsening symptoms follow-up. Patient verbalizes understanding and agreement with plan of care. Denies further needs or concerns.     Patient was given instructions and counseling regarding her condition and for health maintenance advised.       All questions were answered to her satisfaction.              Meds ordered during this visit  New Medications Ordered This Visit   Medications    hydrocortisone 1 % cream     Sig: Apply 1 application  topically to the appropriate area as directed 2 (Two) Times a Day.     Dispense:  1 each     Refill:  3       Visit Diagnoses    ICD-10-CM ICD-9-CM   1. Non-small cell lung cancer metastatic to bone  C34.90 162.9    C79.51 198.5       Follow Up   In 3 weeks to assess for tolerance and DRAE  Check CBC and CMP        This document has been electronically signed by Jami Holly MD   August 10, 2023 14:07 EDT    Dictated Utilizing Dragon Dictation: Part of this note may be an electronic transcription/translation of spoken language to printed text using the Dragon Dictation System.

## 2023-08-14 ENCOUNTER — TELEPHONE (OUTPATIENT)
Dept: ONCOLOGY | Facility: CLINIC | Age: 73
End: 2023-08-14

## 2023-08-14 NOTE — TELEPHONE ENCOUNTER
Pt called and said she has a really bad headache in her eyes and she has been really sick to her stomach but she said her nausea meds seems like it is not working. She wanted to know if should could have something called in for her appetite.

## 2023-08-24 ENCOUNTER — OFFICE VISIT (OUTPATIENT)
Dept: PULMONOLOGY | Facility: CLINIC | Age: 73
End: 2023-08-24
Payer: MEDICARE

## 2023-08-24 ENCOUNTER — TELEPHONE (OUTPATIENT)
Dept: ONCOLOGY | Facility: CLINIC | Age: 73
End: 2023-08-24
Payer: MEDICARE

## 2023-08-24 VITALS
HEIGHT: 63 IN | SYSTOLIC BLOOD PRESSURE: 110 MMHG | WEIGHT: 189 LBS | TEMPERATURE: 98 F | BODY MASS INDEX: 33.49 KG/M2 | HEART RATE: 72 BPM | DIASTOLIC BLOOD PRESSURE: 80 MMHG | RESPIRATION RATE: 18 BRPM | OXYGEN SATURATION: 98 %

## 2023-08-24 DIAGNOSIS — Z87.891 FORMER SMOKER: ICD-10-CM

## 2023-08-24 DIAGNOSIS — C34.91 NON-SMALL CELL CANCER OF RIGHT LUNG: Primary | ICD-10-CM

## 2023-08-24 DIAGNOSIS — J44.9 CHRONIC OBSTRUCTIVE PULMONARY DISEASE, UNSPECIFIED COPD TYPE: ICD-10-CM

## 2023-08-24 DIAGNOSIS — J96.11 CHRONIC RESPIRATORY FAILURE WITH HYPOXIA: ICD-10-CM

## 2023-08-24 DIAGNOSIS — E66.9 OBESITY (BMI 30-39.9): ICD-10-CM

## 2023-08-24 PROCEDURE — 99214 OFFICE O/P EST MOD 30 MIN: CPT | Performed by: PHYSICIAN ASSISTANT

## 2023-08-24 PROCEDURE — 3074F SYST BP LT 130 MM HG: CPT | Performed by: PHYSICIAN ASSISTANT

## 2023-08-24 PROCEDURE — 1160F RVW MEDS BY RX/DR IN RCRD: CPT | Performed by: PHYSICIAN ASSISTANT

## 2023-08-24 PROCEDURE — 1159F MED LIST DOCD IN RCRD: CPT | Performed by: PHYSICIAN ASSISTANT

## 2023-08-24 PROCEDURE — 3079F DIAST BP 80-89 MM HG: CPT | Performed by: PHYSICIAN ASSISTANT

## 2023-08-24 NOTE — PROGRESS NOTES
Subjective      Chief Complaint  non-small cell cancer of right lung     Subjective      History of Present Illness  Caprice Rodriguez is a 73 y.o. female who presents today to [facility] with past medical history of essential hypertension, hyperlipidemia, hypothyroidism, and COPD who presents today for non-small cell cancer of right lung . This visit is a follow up appointment.     non-small cell cancer of right lung :  Patient previously had PET/CT scan that was concerning for underlying malignancy with metastasis to bone and possible pathologic fracture. She has underwent multiple biopsies including a bronchoscopy with biopsy and CT needle guided biopsy with pathology results both negative for malignancy. The bronchoscopy fungal cultures were positive for multiple different fungal species including Candida albicans, penicillium, and cladosporium. She does currently follow with infectious disease for further evaluation of positive bronchoscopy cultures. Infectious disease determined that positive culture results were likely fungal colonization and not evidence of true fungal invasive infection at this time.  She had an appointment with CT surgery who recommended navigational bronchoscopy. Navigational bronchoscopy was performed on 06/28/2023 with pathology results revealing malignancy with  non-small cell carcinoma most consistent with adenocarcinoma. Patient follows with hematology/oncology for treatment of her NSCLC.    Interval history:  Patient reports that her symptoms are currently at baseline. She denies any shortness of breath, cough, or wheezing. She continues to use Symbicort 2 puffs BID and albuterol as needed. She is compliant with as needed oxygen use. She was recently started on Lumakras for treatment of her lung cancer per oncology but reports that she stopped taking this. She states that the medication made her nauseous and weak.        Current Outpatient Medications:     albuterol (ACCUNEB) 0.63 MG/3ML nebulizer solution, USE 1 VIAL IN NEBULIZER EVERY 6 HOURS, Disp: , Rfl:     amitriptyline (ELAVIL) 25 MG tablet, Take 1 tablet by mouth Daily., Disp: , Rfl:     atenolol (TENORMIN) 25 MG tablet, Take 1 tablet by mouth Daily., Disp: , Rfl:     atorvastatin (LIPITOR) 20 MG tablet, Take 1 tablet by mouth Daily., Disp: , Rfl:     Budesonide-Formoterol Fumarate (SYMBICORT IN), Inhale 2 puffs 2 (two) times a day as needed., Disp: , Rfl:     Calcium Carbonate-Vitamin D (CALCIUM 600+D3 PO), Take  by mouth., Disp: , Rfl:     Cholecalciferol (Vitamin D3) 1.25 MG (21438 UT) capsule, Take 1 capsule by mouth 1 (One) Time Per Week., Disp: , Rfl:     HYDROcodone-acetaminophen (NORCO) 5-325 MG per tablet, Take 1 tablet by mouth Every 8 (Eight) Hours As Needed for Moderate Pain., Disp: 30 tablet, Rfl: 0    hydrocortisone 1 % cream, Apply 1 application  topically to the appropriate area as directed 2 (Two) Times a Day., Disp: 1 each, Rfl: 3    levothyroxine (SYNTHROID, LEVOTHROID) 112 MCG tablet, Take 1 tablet by mouth Daily., Disp: , Rfl:     lisinopril (PRINIVIL,ZESTRIL) 10 MG tablet, Take 1 tablet by mouth Every Night., Disp: , Rfl:     lisinopril-hydrochlorothiazide (PRINZIDE,ZESTORETIC) 10-12.5 MG per tablet, Take 1 tablet by mouth Every Morning., Disp: , Rfl:     meloxicam (MOBIC) 7.5 MG tablet, Take 1 tablet by mouth Daily., Disp: , Rfl:     nitroglycerin (NITROSTAT) 0.4 MG SL tablet, , Disp: , Rfl:     omeprazole (PriLOSEC) 20 MG capsule, Take 1 capsule by mouth 2 (Two) Times a Day., Disp: , Rfl:     ondansetron (ZOFRAN) 8 MG tablet, Take 1 tablet by mouth 3 (Three) Times a Day As Needed for Nausea or Vomiting., Disp: 30 tablet, Rfl: 5    polyethylene glycol (MiraLax) 17 GM/SCOOP powder, Take 17 g by mouth Daily As Needed (Constipation)., Disp: 510 g, Rfl: 2    potassium chloride (K-DUR) 10 MEQ CR tablet, Take 1 tablet by mouth Daily., Disp: , Rfl:      "prochlorperazine (COMPAZINE) 10 MG tablet, Take 1 tablet by mouth Every 6 (Six) Hours As Needed for Nausea or Vomiting. May take with the ondansetron., Disp: 60 tablet, Rfl: 3    sennosides-docusate (senna-docusate sodium) 8.6-50 MG per tablet, Take 2 tablets by mouth 2 (Two) Times a Day As Needed for Constipation., Disp: 120 tablet, Rfl: 2    Sotorasib (Lumakras) 320 MG tablet, Take 3 tablets by mouth Daily., Disp: 90 tablet, Rfl: 5    tolterodine LA (DETROL LA) 4 MG 24 hr capsule, Take 1 capsule by mouth Daily., Disp: , Rfl:     vitamin B-12 (CYANOCOBALAMIN) 1000 MCG tablet, Take 1 tablet by mouth Daily., Disp: , Rfl:       No Known Allergies    Objective     Objective   Vital Signs:  /80   Pulse 72   Temp 98 øF (36.7 øC) (Temporal)   Resp 18   Ht 160 cm (62.99\")   Wt 85.7 kg (189 lb)   SpO2 98%   BMI 33.49 kg/mý   Estimated body mass index is 33.49 kg/mý as calculated from the following:    Height as of this encounter: 160 cm (62.99\").    Weight as of this encounter: 85.7 kg (189 lb).    Past Medical History:   Diagnosis Date    Arthritis     Chest pain     COPD (chronic obstructive pulmonary disease)     Elevated cholesterol     GERD (gastroesophageal reflux disease)     Hyperlipidemia     Hypertension     Hypokalemia     Hypothyroidism     Pericardial effusion, trivial      Past Surgical History:   Procedure Laterality Date    APPENDECTOMY      BREAST BIOPSY Left     BRONCHOSCOPY Bilateral 05/02/2023    Procedure: BRONCHOSCOPY WITH ENDOBRONCHIAL ULTRASOUND;  Surgeon: Juan Henry MD;  Location:  [facility];  Service: Pulmonary;  Laterality: Bilateral;    BRONCHOSCOPY WITH ION ROBOTIC ASSIST N/A 6/28/2023    Procedure: NAVIGATIONAL BRONCHOSCOPY WITH ION ROBOT;  Surgeon: Royal Smith MD;  Location:  [facility];  Service: Robotics - Pulmonary;  Laterality: N/A;    CARDIAC CATHETERIZATION  2008    CARDIOVASCULAR STRESS TEST  09/2014    CATARACT EXTRACTION, BILATERAL Bilateral     2015 " right eye, Dr Hauser  and  2017 left eye Dr Meyer    COLONOSCOPY      ECHO - CONVERTED  2014    ENDOSCOPY      GALLBLADDER SURGERY      HERNIA REPAIR      TUBAL ABDOMINAL LIGATION       Social History     Socioeconomic History    Marital status:     Number of children: 1   Tobacco Use    Smoking status: Former     Packs/day: 2.00     Types: Cigarettes     Quit date:      Years since quittin.6     Passive exposure: Past    Smokeless tobacco: Never   Vaping Use    Vaping Use: Never used   Substance and Sexual Activity    Alcohol use: Not Currently     Alcohol/week: 1.0 standard drink     Types: 1 Cans of beer per week     Comment: occasional fire ball for sickness    Drug use: No    Sexual activity: Defer      Physical Exam  Constitutional:       General: She is awake.      Appearance: Normal appearance.   HENT:      Head: Normocephalic and atraumatic.      Nose: Nose normal.      Mouth/Throat:      Mouth: Mucous membranes are moist.      Pharynx: Oropharynx is clear.   Eyes:      Conjunctiva/sclera: Conjunctivae normal.      Pupils: Pupils are equal, round, and reactive to light.   Cardiovascular:      Rate and Rhythm: Normal rate and regular rhythm.      Pulses: Normal pulses.      Heart sounds: Normal heart sounds. No murmur heard.    No friction rub. No gallop.   Pulmonary:      Effort: Pulmonary effort is normal. No tachypnea, accessory muscle usage or respiratory distress.      Breath sounds: Normal breath sounds. No decreased breath sounds, wheezing, rhonchi or rales.   Musculoskeletal:         General: Normal range of motion.      Cervical back: Full passive range of motion without pain and normal range of motion.   Skin:     General: Skin is warm and dry.   Neurological:      General: No focal deficit present.      Mental Status: She is alert. Mental status is at baseline.   Psychiatric:         Mood and Affect: Mood normal.         Behavior: Behavior normal.         Thought Content:  Thought content normal.      Result Review :  The following labs and radiology results have been reviewed.    Lab Review:   No results found for: FEV1, FVC, [id number], TLC, DLCO  Lab on 08/10/2023   Component Date Value Ref Range Status    Glucose 08/10/2023 108 (H)  65 - 99 mg/dL Final    BUN 08/10/2023 16  8 - 23 mg/dL Final    Creatinine 08/10/2023 1.07 (H)  0.57 - 1.00 mg/dL Final    Sodium 08/10/2023 142  136 - 145 mmol/L Final    Potassium 08/10/2023 3.6  3.5 - 5.2 mmol/L Final    Chloride 08/10/2023 102  98 - 107 mmol/L Final    CO2 08/10/2023 29.1 (H)  22.0 - 29.0 mmol/L Final    Calcium 08/10/2023 10.0  8.6 - 10.5 mg/dL Final    Total Protein 08/10/2023 7.0  6.0 - 8.5 g/dL Final    Albumin 08/10/2023 3.8  3.5 - 5.2 g/dL Final    ALT (SGPT) 08/10/2023 9  1 - 33 U/L Final    AST (SGOT) 08/10/2023 21  1 - 32 U/L Final    Alkaline Phosphatase 08/10/2023 95  39 - 117 U/L Final    Total Bilirubin 08/10/2023 0.4  0.0 - 1.2 mg/dL Final    Globulin 08/10/2023 3.2  gm/dL Final    A/G Ratio 08/10/2023 1.2  g/dL Final    BUN/Creatinine Ratio 08/10/2023 15.0  7.0 - 25.0 Final    Anion Gap 08/10/2023 10.9  5.0 - 15.0 mmol/L Final    eGFR 08/10/2023 54.3 (L)  >60.0 mL/min/1.73 Final    WBC 08/10/2023 7.44  3.40 - 10.80 10*3/mm3 Final    RBC 08/10/2023 4.45  3.77 - 5.28 10*6/mm3 Final    Hemoglobin 08/10/2023 11.7 (L)  12.0 - 15.9 g/dL Final    Hematocrit 08/10/2023 38.2  34.0 - 46.6 % Final    MCV 08/10/2023 85.8  79.0 - 97.0 fL Final    MCH 08/10/2023 26.3 (L)  26.6 - 33.0 pg Final    MCHC 08/10/2023 30.6 (L)  31.5 - 35.7 g/dL Final    RDW 08/10/2023 15.1  12.3 - 15.4 % Final    RDW-SD 08/10/2023 46.8  37.0 - 54.0 fl Final    MPV 08/10/2023 8.8  6.0 - 12.0 fL Final    Platelets 08/10/2023 228  140 - 450 10*3/mm3 Final    Neutrophil % 08/10/2023 65.4  42.7 - 76.0 % Final    Lymphocyte % 08/10/2023 19.9  19.6 - 45.3 % Final    Monocyte % 08/10/2023 10.3  5.0 - 12.0 % Final    Eosinophil % 08/10/2023 3.2  0.3 - 6.2 % Final     Basophil % 08/10/2023 0.7  0.0 - 1.5 % Final    Immature Grans % 08/10/2023 0.5  0.0 - 0.5 % Final    Neutrophils, Absolute 08/10/2023 4.86  1.70 - 7.00 10*3/mm3 Final    Lymphocytes, Absolute 08/10/2023 1.48  0.70 - 3.10 10*3/mm3 Final    Monocytes, Absolute 08/10/2023 0.77  0.10 - 0.90 10*3/mm3 Final    Eosinophils, Absolute 08/10/2023 0.24  0.00 - 0.40 10*3/mm3 Final    Basophils, Absolute 08/10/2023 0.05  0.00 - 0.20 10*3/mm3 Final    Immature Grans, Absolute 08/10/2023 0.04  0.00 - 0.05 10*3/mm3 Final    nRBC 08/10/2023 0.0  0.0 - 0.2 /100 WBC Final   Admission on 07/23/2023, Discharged on 07/23/2023   Component Date Value Ref Range Status    Extra Tube 07/23/2023 Hold for add-ons.   Final    Auto resulted.    Extra Tube 07/23/2023 hold for add-on   Final    Auto resulted    Extra Tube 07/23/2023 Hold for add-ons.   Final    Auto resulted.    Extra Tube 07/23/2023 Hold for add-ons.   Final    Auto resulted    Glucose 07/23/2023 94  65 - 99 mg/dL Final    BUN 07/23/2023 32 (H)  8 - 23 mg/dL Final    Creatinine 07/23/2023 1.45 (H)  0.57 - 1.00 mg/dL Final    Sodium 07/23/2023 136  136 - 145 mmol/L Final    Potassium 07/23/2023 3.7  3.5 - 5.2 mmol/L Final    Chloride 07/23/2023 98  98 - 107 mmol/L Final    CO2 07/23/2023 24.7  22.0 - 29.0 mmol/L Final    Calcium 07/23/2023 9.8  8.6 - 10.5 mg/dL Final    Total Protein 07/23/2023 7.3  6.0 - 8.5 g/dL Final    Albumin 07/23/2023 3.6  3.5 - 5.2 g/dL Final    ALT (SGPT) 07/23/2023 9  1 - 33 U/L Final    AST (SGOT) 07/23/2023 19  1 - 32 U/L Final    Alkaline Phosphatase 07/23/2023 103  39 - 117 U/L Final    Total Bilirubin 07/23/2023 1.2  0.0 - 1.2 mg/dL Final    Globulin 07/23/2023 3.7  gm/dL Final    A/G Ratio 07/23/2023 1.0  g/dL Final    BUN/Creatinine Ratio 07/23/2023 22.1  7.0 - 25.0 Final    Anion Gap 07/23/2023 13.3  5.0 - 15.0 mmol/L Final    eGFR 07/23/2023 37.7 (L)  >60.0 mL/min/1.73 Final    proBNP 07/23/2023 227.5  0.0 - 1,800.0 pg/mL Final    D-Dimer,  Quantitative 07/23/2023 5.87 (H)  0.00 - 0.75 MCGFEU/mL Final    HS Troponin T 07/23/2023 26 (H)  <10 ng/L Final    WBC 07/23/2023 8.71  3.40 - 10.80 10*3/mm3 Final    RBC 07/23/2023 4.82  3.77 - 5.28 10*6/mm3 Final    Hemoglobin 07/23/2023 12.9  12.0 - 15.9 g/dL Final    Hematocrit 07/23/2023 40.2  34.0 - 46.6 % Final    MCV 07/23/2023 83.4  79.0 - 97.0 fL Final    MCH 07/23/2023 26.8  26.6 - 33.0 pg Final    MCHC 07/23/2023 32.1  31.5 - 35.7 g/dL Final    RDW 07/23/2023 14.9  12.3 - 15.4 % Final    RDW-SD 07/23/2023 45.2  37.0 - 54.0 fl Final    MPV 07/23/2023 9.8  6.0 - 12.0 fL Final    Platelets 07/23/2023 246  140 - 450 10*3/mm3 Final    Neutrophil % 07/23/2023 68.5  42.7 - 76.0 % Final    Lymphocyte % 07/23/2023 18.0 (L)  19.6 - 45.3 % Final    Monocyte % 07/23/2023 11.4  5.0 - 12.0 % Final    Eosinophil % 07/23/2023 1.0  0.3 - 6.2 % Final    Basophil % 07/23/2023 0.6  0.0 - 1.5 % Final    Immature Grans % 07/23/2023 0.5  0.0 - 0.5 % Final    Neutrophils, Absolute 07/23/2023 5.97  1.70 - 7.00 10*3/mm3 Final    Lymphocytes, Absolute 07/23/2023 1.57  0.70 - 3.10 10*3/mm3 Final    Monocytes, Absolute 07/23/2023 0.99 (H)  0.10 - 0.90 10*3/mm3 Final    Eosinophils, Absolute 07/23/2023 0.09  0.00 - 0.40 10*3/mm3 Final    Basophils, Absolute 07/23/2023 0.05  0.00 - 0.20 10*3/mm3 Final    Immature Grans, Absolute 07/23/2023 0.04  0.00 - 0.05 10*3/mm3 Final    nRBC 07/23/2023 0.0  0.0 - 0.2 /100 WBC Final    HS Troponin T 07/23/2023 21 (H)  <10 ng/L Final    Troponin T Delta 07/23/2023 -5 (L)  >=-4 - <+4 ng/L Final   Admission on 06/28/2023, Discharged on 06/28/2023   Component Date Value Ref Range Status    Case Report 06/28/2023    Final                    Value:Surgical Pathology Report                         Case: [id number]                                  Authorizing Provider:  Royal Smith MD Collected:           06/28/2023 08:15 AM          Ordering Location:     [facility]   Received:             06/28/2023 09:49 AM                                 ENDO SUITES                                                                  Pathologist:           Dong Shirley MD                                                        Specimens:   1) - Lung, Right Middle Lobe, RML BX FOR PATH                                                       2) - Lymph Node, STATION 8R TBNA FOR PATH                                                  Clinical Information 06/28/2023    Final                    Value:This result contains rich text formatting which cannot be displayed here.    Final Diagnosis 06/28/2023    Final                    Value:This result contains rich text formatting which cannot be displayed here.    Comment 06/28/2023    Final                    Value:This result contains rich text formatting which cannot be displayed here.    Gross Description 06/28/2023    Final                    Value:This result contains rich text formatting which cannot be displayed here.    Special Stains 06/28/2023    Final                    Value:This result contains rich text formatting which cannot be displayed here.    Microscopic Description 06/28/2023    Final                    Value:This result contains rich text formatting which cannot be displayed here.    Fungus Culture 06/28/2023 No fungus isolated at 6 weeks   Final    AFB Culture 06/28/2023 No AFB isolated at 6 weeks   Final    AFB Stain 06/28/2023 No acid fast bacilli seen on concentrated smear   Final    Fungal Stain 06/28/2023 No fungal elements seen   Final    Respiratory Culture 06/28/2023 No growth   Final    Gram Stain 06/28/2023 Few (2+) WBCs seen   Final    Gram Stain 06/28/2023 No organisms seen   Final    Reference Lab Report 06/28/2023    Final                    Value:[facility]  [address] [place] [address]  Phone: [phone] or [phone]  Fax: [phone]  Houston Kerr M.D., Medical  Director    PATIENT NAME                                 LABORATORY NO.  [id number]   SHERI LANDRY.                          [id number]  [id number]                                     AGE                SEX     SSN            CLIENT REF #  [facility]                       73       1950   F       [id number]    [id number]  [address]                          REQUESTING M.D.       ATTENDING M.D..        [place] [address]                            BRENDON BUCK  DATE COLLECTED        DATE RECEIVED          DATE REPORTED  2023    DIAGNOSIS:  A.      TBNA, FNA, RIGHT MIDDLE LOBE:  Malignant  B.      BRONCH BRUSH, RIGHT MIDDLE LOBE:  Malignant  C.      BRONCHIAL LAVAGE:  Malignant    MICROSCOPIC DESCRIPTION:  A.     Non-small cell                           carcinoma most consistent with adenocarcinoma, see comment.  B.     Non-small cell carcinoma most consistent with adenocarcinoma, see comment.  C.     Non-small cell carcinoma most consistent with adenocarcinoma, see comment.    Professional interpretation rendered by Ricardo Wynne M.D., F.C.A.P. at [facility], [facility], [address] [place] [address].    COMMENT:  The patient's concurrent biopsy at [facility] (SX ) showed  a CK7 positive non-small cell carcinoma, with no significant additional staining to  confirm tumor origin.  Please correlate with the case from [facility].    CLINICAL HISTORY:  Mass of right lung    SPECIMENS SUBMITTED:  A.    TBNA, FNA, RIGHT MIDDLE LOBE  B.    BRONCH BRUSH, RIGHT MIDDLE LOBE  C.    BRONCHIAL LAVAGE    GROSS SPECIMEN DESCRIPTION:  A.     30cc of red fluid with stringy sediment in fixative  Cell block has been examined.  B.     1 Brush in fixative with scant sediment in fixative  Cell block has been                           examined.  C.     45cc of bright red fluid with sediment in  fixative  Cell block has been examined.    REVIEWED, DIAGNOSED AND ELECTRONICALLY  SIGNED BY:    Ricardo Wynne M.D., F.C.A.P.  CPT CODES:  65136, 88305x3, 88112x2     Hospital Outpatient Visit on 06/23/2023   Component Date Value Ref Range Status    QT Interval 06/23/2023 366  ms Final    QTC Interval 06/23/2023 442  ms Final   Lab on 06/23/2023   Component Date Value Ref Range Status    Glucose 06/23/2023 109 (H)  65 - 99 mg/dL Final    BUN 06/23/2023 14  8 - 23 mg/dL Final    Creatinine 06/23/2023 0.80  0.57 - 1.00 mg/dL Final    Sodium 06/23/2023 140  136 - 145 mmol/L Final    Potassium 06/23/2023 3.9  3.5 - 5.2 mmol/L Final    Chloride 06/23/2023 102  98 - 107 mmol/L Final    CO2 06/23/2023 30.6 (H)  22.0 - 29.0 mmol/L Final    Calcium 06/23/2023 10.1  8.6 - 10.5 mg/dL Final    Total Protein 06/23/2023 7.0  6.0 - 8.5 g/dL Final    Albumin 06/23/2023 3.9  3.5 - 5.2 g/dL Final    ALT (SGPT) 06/23/2023 12  1 - 33 U/L Final    AST (SGOT) 06/23/2023 19  1 - 32 U/L Final    Alkaline Phosphatase 06/23/2023 101  39 - 117 U/L Final    Total Bilirubin 06/23/2023 0.5  0.0 - 1.2 mg/dL Final    Globulin 06/23/2023 3.1  gm/dL Final    A/G Ratio 06/23/2023 1.3  g/dL Final    BUN/Creatinine Ratio 06/23/2023 17.5  7.0 - 25.0 Final    Anion Gap 06/23/2023 7.4  5.0 - 15.0 mmol/L Final    eGFR 06/23/2023 76.9  >60.0 mL/min/1.73 Final    Protime 06/23/2023 13.4  12.1 - 14.7 Seconds Final    INR 06/23/2023 0.98  0.90 - 1.10 Final    PTT 06/23/2023 28.3  26.5 - 34.5 seconds Final    WBC 06/23/2023 7.28  3.40 - 10.80 10*3/mm3 Final    RBC 06/23/2023 4.77  3.77 - 5.28 10*6/mm3 Final    Hemoglobin 06/23/2023 12.9  12.0 - 15.9 g/dL Final    Hematocrit 06/23/2023 39.0  34.0 - 46.6 % Final    MCV 06/23/2023 81.8  79.0 - 97.0 fL Final    MCH 06/23/2023 27.0  26.6 - 33.0 pg Final    MCHC 06/23/2023 33.1  31.5 - 35.7 g/dL Final    RDW 06/23/2023 14.1  12.3 - 15.4 % Final    RDW-SD 06/23/2023 42.0  37.0 - 54.0 fl Final    MPV 06/23/2023 10.3   6.0 - 12.0 fL Final    Platelets 06/23/2023 250  140 - 450 10*3/mm3 Final    Neutrophil % 06/23/2023 64.1  42.7 - 76.0 % Final    Lymphocyte % 06/23/2023 22.7  19.6 - 45.3 % Final    Monocyte % 06/23/2023 10.7  5.0 - 12.0 % Final    Eosinophil % 06/23/2023 1.5  0.3 - 6.2 % Final    Basophil % 06/23/2023 0.5  0.0 - 1.5 % Final    Immature Grans % 06/23/2023 0.5  0.0 - 0.5 % Final    Neutrophils, Absolute 06/23/2023 4.66  1.70 - 7.00 10*3/mm3 Final    Lymphocytes, Absolute 06/23/2023 1.65  0.70 - 3.10 10*3/mm3 Final    Monocytes, Absolute 06/23/2023 0.78  0.10 - 0.90 10*3/mm3 Final    Eosinophils, Absolute 06/23/2023 0.11  0.00 - 0.40 10*3/mm3 Final    Basophils, Absolute 06/23/2023 0.04  0.00 - 0.20 10*3/mm3 Final    Immature Grans, Absolute 06/23/2023 0.04  0.00 - 0.05 10*3/mm3 Final    nRBC 06/23/2023 0.0  0.0 - 0.2 /100 WBC Final   Office Visit on 06/01/2023   Component Date Value Ref Range Status    C-Reactive Protein 06/01/2023 0.74 (H)  0.00 - 0.50 mg/dL Final    Aspergillus fumigatus 06/01/2023 Negative  Neg:<1:1 Final    Aspergillus flavus 06/01/2023 Negative  Neg:<1:1 Final    Aspergillus niger 06/01/2023 Negative  Neg:<1:1 Final    Blastomyces Abs, Qn, DID 06/01/2023 Negative  Neg:<1:1 Final    FUNGITELL RESULT 06/01/2023 <31  <80 pg/mL Final    Comment: Interpretation: The Fungitell assay does not detect  certain fungal species such as the genus Cryptococcus  (Lala et al. 1991) which produces very low levels of  (1-3)-Beta-D-Glucan. The assay also does not detect the  Zygomycetes such as Absidia, Mucor and Rhizopus (Cricket  et al. 1994) which are not known to produce  (1-3)-Beta-D-Glucan. In addition, the yeast phase of  Blastomyces dermatitidis produces little  (1-3)-Beta-D-Glucan and may not be detected by the assay  (Sanjana et al. 2007).  Reference Range:  Less than 60 pg/mL. Glucan values of less than 60 pg/mL  are interpreted as negative.  Glucan values of 60 to 79 pg/mL are interpreted  as  indeterminate, and suggest a possible fungal infection.  Additional sampling and testing of sera is required to  interpret the results.  Glucan values of greater than or equal to 80 pg/mL are  interpreted as positive.  Due to the potential for environmental contamination when  transferred to pour-off tubes, which can lead to                            false  positive results, interpret positive results from samples  provided in pour-off tubes with caution.  Results should  be used in conjunction with clinical findings, and should  not form the sole basis for a diagnosis or treatment  decision. The Fungitell test is approved or cleared for in  vitro diagnostic use by the U.[place] Food and Drug  Administration. Modifications to the approved package  insert have been made and the performance characteristics  for these modifications were determined by [facility].  If sample result is greater than 500 pg/mL, physician may  order a titer of the sample. Please contact [facility] if you would like to order a retest of this sample  to obtain an actual value. Samples are held for 1 week  after initial testing date.    Aspergillus Ag, BAL/Serum 06/01/2023 0.04  0.00 - 0.49 Index Final    Procalcitonin 06/01/2023 0.06  0.00 - 0.25 ng/mL Final      Reviewed previous PFT from 12/2021      Reviewed previous PET/CT scan from 04/11/2023    Narrative & Impression   EXAM:    PET/CT Skull Base to Mid Thigh     EXAM DATE:    4/11/2023 10:13 AM     CLINICAL HISTORY:    r91.8; R91.8-Other nonspecific abnormal finding of lung field     TECHNIQUE:    Axial Positron Emission Tomography / Computed Tomography images were  obtained from skull base to mid thigh following the intravenous  administration of F-18 FDG. MIP reconstructed images were reviewed.     COMPARISON:    No relevant prior studies available.     FINDINGS:      HEAD:    Brain:  Unremarkable as visualized.    Nasopharynx:  Unremarkable.    Dental:  Extensive FDG  hypermetabolism localizing to the anterior  maxilla which could BE due to inflammation in the setting of dental  disease or could represent FDG hypermetabolic bone metastasis. Maximum  SUV: 10.2.      NECK:    Hypopharynx:  Unremarkable.    Larynx:  Unremarkable.    Trachea:  Unremarkable.    Retropharyngeal space:  Unremarkable.    Submandibular/parotid glands:  Unremarkable.  Glands are normal in  size.    Thyroid:  Unremarkable.  No enlarged or calcified nodules.      CHEST:    Lungs:  Right middle lobe pulmonary nodule is 26.6 x 24.2 mm and  appears to attach to the major fissure as well as the pleural at the  periphery and demonstrates FDG hypermetabolism in the range of  malignancy. Maximum SUV: 13.8.    Pleural space:  Unremarkable.  No significant effusion.  No  pneumothorax.    Heart:  Cardiomegaly with moderate coronary artery calcifications.  No  significant pericardial effusion.    Mediastinum:  Unremarkable.  No mass.      ABDOMEN:    Liver:  Fatty liver.    Gallbladder and bile ducts:  Cholecystectomy.  No ductal dilation.    Pancreas:  Unremarkable.  No ductal dilation.    Spleen:  Unremarkable.  No splenomegaly.    Adrenals:  Unremarkable.  No mass.    Kidneys and ureters:  Unremarkable.    Stomach and bowel:  Diverticulosis without diverticulitis.  No  obstruction.      PELVIS:    Appendix:  No findings to suggest acute appendicitis.    Bladder:  Unremarkable.    Reproductive:  Unremarkable as visualized.      WHOLE BODY:    Intraperitoneal space:  Unremarkable.  No significant fluid  collection.  No free air.    Bones/joints:  FDG hypermetabolism in association of a right lateral  fifth rib lesion with pathologic fracture with maximum SUV: 8.6.  FDG  hypermetabolism in association with lytic bone lesion of the right  posterior seventh rib with maximum SUV: 9.8.  FDG hypermetabolism in  association with lytic bone lesion of the right iliac wing with maximum  SUV: 8.7.  No dislocation.    Soft  tissues:  Unremarkable.    Vasculature:  Atherosclerosis thoracic and abdominal aorta.  No aortic  aneurysm.    Lymph nodes:  FDG hypermetabolism localizing to the right hilum most  consistent with hypermetabolic adenopathy with maximum SUV: 6.6.   Nonenlarged upper cervical lymph nodes show low-grade FDG  hypermetabolism that approach the range of malignancy but are  predominantly in the range of inflammation with maximum SUV: 3.4, 3.2.  Right cervical lymph node is 1.1 cm. Left cervical or facial lymph node  is 0.7 cm.     IMPRESSION:  1.  Right middle lobe pulmonary nodule is 26.6 x 24.2 mm and appears to  attach to the major fissure as well as the pleura at the periphery and  demonstrates FDG hypermetabolism in the range of malignancy. Maximum  SUV: 13.8.  2.  FDG hypermetabolism localizing to the right hilum most consistent  with hypermetabolic adenopathy with maximum SUV: 6.6.  3.  Extensive FDG hypermetabolism localizing to the anterior maxilla  which could BE due to inflammation in the setting of dental disease or  could represent FDG hypermetabolic bone metastasis. Maximum SUV: 10.2.  4.  FDG hypermetabolism in association of a right lateral fifth rib  lesion with pathologic fracture with maximum SUV: 8.6.  5.  FDG hypermetabolism in association with lytic bone lesion of the  right posterior seventh rib with maximum SUV: 9.8.  6.  FDG hypermetabolism in association with lytic bone lesion of the  right iliac wing with maximum SUV: 8.7.  7.  Nonenlarged upper cervical lymph nodes show low-grade FDG  hypermetabolism that approach the range of malignancy but are  predominantly in the range of inflammation with maximum SUV: 3.4, 3.2.  Right cervical lymph node is 1.1 cm. Left cervical or facial lymph node  is 0.7 cm.  8. Other incidental and nonacute findings as above.     This report was finalized on 4/11/2023 11:53 AM by Dr. Maico Mancera MD.          Reviewed pathology results from navigational bronchoscopy  on 06/28/2023        Assessment / Plan         Assessment   Diagnoses and all orders for this visit:    1. Non-small cell cancer of right lung (Primary)  2. Former smoker  Previous PET/CT imaging reviewed and noted above concerning for malignancy with metastasis.   Has underwent 2 biopsies including bronchoscopy with biopsy and CT needle guided biopsy and both pathology reports noted above were negative for malignancy but concerns for inadequate sample.   Previous bronchoscopy fungal cultures were positive for multiple different fungi including penicillium, candida albicans, and cladosporium. Follows with infectious disease for further evaluation. Fungal antibodies, fungitell, aspergillus galactomannan were negative. Fungal cultures with no growth from navigational bronchoscopy on 06/28/2023. ID determined that cultures were likely fungal colonization rather than evidence of true fungal invasive infection.   Navigational bronchoscopy performed on 06/28/2023 with pathology results confirming malignancy of right lung with non-small cell carcinoma concerning for adenocarcinoma.  Continue to follow with oncology for treatment of NSCLC. Recently started on Lumakras but reports that she stopped taking this due to nausea and weakness. Informed Dr. Holly, oncology, of patient's decision. Possible treatment alternatives to be discussed at upcoming appointment on 08/31.     3. Chronic obstructive pulmonary disease, unspecified COPD type  Previous PFT from 12/2021 revealed moderate obstructive lung disease and mild restrictive lung disease.   Continue albuterol inhaler and nebulizer treatments every 4-6 hours as needed.   Continue Symbicort inhaler 2 puffs twice daily.     4. Chronic respiratory failure with hypoxia  5. Obesity (BMI 30-39.9)  Compliant with supplemental oxygen use at night, with exertion, and as needed.   Tolerating room air during today's visit.     I spent 30 minutes caring for Caprice on this date of  service. This time includes time spent by me in the following activities:preparing for the visit, reviewing tests, obtaining and/or reviewing a separately obtained history, performing a medically appropriate examination and/or evaluation , counseling and educating the patient/family/caregiver, ordering medications, tests, or procedures, documenting information in the medical record and independently interpreting results and communicating that information with the patient/family/caregiver.    Follow Up   Return in about 3 months (around 11/24/2023), or if symptoms worsen or fail to improve, for Next scheduled follow up.    Patient was given instructions and counseling regarding her condition or for health maintenance advice. Please see specific information pulled into the AVS if appropriate.       This document has been electronically signed by Joanna Peralta PA-C   August 25, 2023 15:36 EDT    Dictated Utilizing Dragon Dictation: Part of this note may be an electronic transcription/translation of spoken language to printed text using the Dragon Dictation System.

## 2023-08-24 NOTE — TELEPHONE ENCOUNTER
"RN called patient to follow up with her regarding oral chemo pill (Lumakras). Patient stated she stopped taking medication on 8/21/23 and has \"never felt better!\" RN relayed information to Dr. Holly and RN encouraged patient to keep follow up appointment with MD so they can discuss an alternative plan if patient is agreeable. Patient verbalized understanding and denies any further questions or concerns at this time. RN encouraged patient to call back with any concerns in the meantime.   "

## 2023-08-30 NOTE — PROGRESS NOTES
Subjective     Date: 8/31/2023    Referring Provider  No ref. 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 female who presents today to [facility] for follow up.     HPI:   73-year-old female with history of hypertension, hyperlipidemia, obesity, remote tobacco use, COPD who presents for consultation regarding new diagnosis of lung cancer.       Oncology history:  April 11 2023 PET/CT shows right middle lobe pulmonary nodule 26.6 x 24.2 mm, appears to attach to the major fissure as well as pleura at the periphery.  mSUV 13.8.  FDG hypermetabolism right hilum, consistent with hypermetabolic adenopathy SUV 6.6.  Extensive FDG hypermetabolism localized to anterior maxilla, SUV 10.2.  FDG hypermetabolism right lateral fifth rib SUV 8.6.  FDG hypermetabolism with lytic bone lesion of right posterior seventh rib SUV 9.8, FDG hypermetabolism with lytic bone lesion of right iliac wing SUV 8.7.  Not enlarged upper cervical lymph nodes show low-grade FDG.  May 2, 2023: Bronchoscopy with BAL and wash by Dr. Henry negative for malignant cells.  Lymph node FNA station 7 negative for malignant cells.  May 19, 2023: Underwent CT-guided needle biopsy of the left lung which showed benign alveolar lung parenchyma with hemorrhage, focal fibrosis, dust pigment negative for dysplasia or malignancy.  June 28, 2023: Navigational bronchoscopy was performed by Dr. Smith.  For which pathology from the right middle lobe lung showed portions of fibrotic lung tissue with limited atypical cells felt to be consistent with carcinoma.  Lymph node station 8R involved by cytokeratin 7 positive carcinoma with nonspecific immunophenotype.  PD-L1 TPS 15%.  June 23, 2023: CT of the chest with large spiculated right lung mass, unchanged from previous imaging.  Bony metastases involving right rib and T11  vertebral body.  8/2/2023-patient started sotorasib.  Discontinued 8/21/23      Mrs. Rodriguez presents today with her niece.  She reports hitting dresser and hurting her right ribs in April, which initiated the work-up with a chest x-ray concerning for a broken ribs and lung mass.  She denies any other symptoms such as cough or shortness of breath.  She has lost approximately 18 pounds since April 2023.    Over the last month has lived with her grandson, who helps her with ADLs.  Able to ambulate on her own.    Remote history of smoking, 42 years ago, 2 packs/day for approximately 2 to 3 years.  Denies regular alcohol or drug use.  Family history significant for brother, sister with lung cancer.  Another brother with hepatocellular carcinoma.  Another sibling with colon cancer.      Interval History 08/31/2023   Patient presents for follow-up.  She has been taking sotorasib for approximately 2 weeks, but discontinued due to fatigue, nausea, vomiting, constipation/diarrhea.  She discontinued on 8/21.  We discussed changing therapy to Adagrasib or Immunotherapy, but she refused.  She is interested in hospice care.      The following portions of the patient's history were reviewed and updated as appropriate: allergies, current medications, past family history, past medical history, past social history, past surgical history and problem list.    Objective     Objective     Allergy:   No Known Allergies     Current Medications:   Current Outpatient Medications   Medication Sig Dispense Refill    albuterol (ACCUNEB) 0.63 MG/3ML nebulizer solution USE 1 VIAL IN NEBULIZER EVERY 6 HOURS      amitriptyline (ELAVIL) 25 MG tablet Take 1 tablet by mouth Daily.      atenolol (TENORMIN) 25 MG tablet Take 1 tablet by mouth Daily.      atorvastatin (LIPITOR) 20 MG tablet Take 1 tablet by mouth Daily.      Budesonide-Formoterol Fumarate (SYMBICORT IN) Inhale 2 puffs 2 (two) times a day as needed.      Calcium Carbonate-Vitamin D  (CALCIUM 600+D3 PO) Take  by mouth.      Cholecalciferol (Vitamin D3) 1.25 MG (54454 UT) capsule Take 1 capsule by mouth 1 (One) Time Per Week.      HYDROcodone-acetaminophen (NORCO) 5-325 MG per tablet Take 1 tablet by mouth Every 8 (Eight) Hours As Needed for Moderate Pain. 30 tablet 0    hydrocortisone 1 % cream Apply 1 application  topically to the appropriate area as directed 2 (Two) Times a Day. 1 each 3    levothyroxine (SYNTHROID, LEVOTHROID) 112 MCG tablet Take 1 tablet by mouth Daily.      lisinopril (PRINIVIL,ZESTRIL) 10 MG tablet Take 1 tablet by mouth Every Night.      lisinopril-hydrochlorothiazide (PRINZIDE,ZESTORETIC) 10-12.5 MG per tablet Take 1 tablet by mouth Every Morning.      meloxicam (MOBIC) 7.5 MG tablet Take 1 tablet by mouth Daily.      nitroglycerin (NITROSTAT) 0.4 MG SL tablet       omeprazole (PriLOSEC) 20 MG capsule Take 1 capsule by mouth 2 (Two) Times a Day.      ondansetron (ZOFRAN) 8 MG tablet Take 1 tablet by mouth 3 (Three) Times a Day As Needed for Nausea or Vomiting. 30 tablet 5    polyethylene glycol (MiraLax) 17 GM/SCOOP powder Take 17 g by mouth Daily As Needed (Constipation). 510 g 2    potassium chloride (K-DUR) 10 MEQ CR tablet Take 1 tablet by mouth Daily.      prochlorperazine (COMPAZINE) 10 MG tablet Take 1 tablet by mouth Every 6 (Six) Hours As Needed for Nausea or Vomiting. May take with the ondansetron. 60 tablet 3    sennosides-docusate (senna-docusate sodium) 8.6-50 MG per tablet Take 2 tablets by mouth 2 (Two) Times a Day As Needed for Constipation. 120 tablet 2    Sotorasib (Lumakras) 320 MG tablet Take 3 tablets by mouth Daily. 90 tablet 5    tolterodine LA (DETROL LA) 4 MG 24 hr capsule Take 1 capsule by mouth Daily.      vitamin B-12 (CYANOCOBALAMIN) 1000 MCG tablet Take 1 tablet by mouth Daily.      dexAMETHasone (DECADRON) 4 MG tablet Take 1 tablet by mouth Daily With Breakfast. 14 tablet 0     No current facility-administered medications for this visit.        Past Medical History:  Past Medical History:   Diagnosis Date    Arthritis     Chest pain     COPD (chronic obstructive pulmonary disease)     Elevated cholesterol     GERD (gastroesophageal reflux disease)     Hyperlipidemia     Hypertension     Hypokalemia     Hypothyroidism     Pericardial effusion, trivial        Past Surgical History:  Past Surgical History:   Procedure Laterality Date    APPENDECTOMY      BREAST BIOPSY Left     BRONCHOSCOPY Bilateral 2023    Procedure: BRONCHOSCOPY WITH ENDOBRONCHIAL ULTRASOUND;  Surgeon: Juan Henry MD;  Location:  [facility];  Service: Pulmonary;  Laterality: Bilateral;    BRONCHOSCOPY WITH ION ROBOTIC ASSIST N/A 2023    Procedure: NAVIGATIONAL BRONCHOSCOPY WITH ION ROBOT;  Surgeon: Royal Smith MD;  Location:  [facility];  Service: Robotics - Pulmonary;  Laterality: N/A;    CARDIAC CATHETERIZATION      CARDIOVASCULAR STRESS TEST  2014    CATARACT EXTRACTION, BILATERAL Bilateral      right eye, Dr Hauser  and  2017 left eye Dr Meyer    COLONOSCOPY      ECHO - CONVERTED  2014    ENDOSCOPY      GALLBLADDER SURGERY      HERNIA REPAIR      TUBAL ABDOMINAL LIGATION         Social History:  Social History     Socioeconomic History    Marital status:     Number of children: 1   Tobacco Use    Smoking status: Former     Packs/day: 2.00     Types: Cigarettes     Quit date:      Years since quittin.6     Passive exposure: Past    Smokeless tobacco: Never   Vaping Use    Vaping Use: Never used   Substance and Sexual Activity    Alcohol use: Not Currently     Alcohol/week: 1.0 standard drink     Types: 1 Cans of beer per week     Comment: occasional fire ball for sickness    Drug use: No    Sexual activity: Defer         Family History:  Family History   Problem Relation Age of Onset    Heart disease Mother     Diabetes Mother     Heart attack Mother         cause of death    Cirrhosis Father         cause of death     "Cancer Sister         liver ca    Cancer Brother         lung ca    Cancer Brother         colon ca    Heart disease Brother     Heart failure Brother     Cancer Sister         lung ca    Heart failure Sister     Cancer Sister        Review of Systems:  Review of Systems   Constitutional:  Positive for appetite change, fatigue and unexpected weight change.   All other systems reviewed and are negative.    Vital Signs:   BP 95/62   Pulse 84   Temp 97.1 øF (36.2 øC) (Temporal)   Resp 18   Ht 160 cm (63\")   Wt 83.5 kg (184 lb)   SpO2 95%   BMI 32.59 kg/mý      Physical Exam:  Physical Exam  Vitals reviewed.   Constitutional:       General: She is not in acute distress.     Appearance: Normal appearance. She is not ill-appearing.   HENT:      Head: Normocephalic and atraumatic.      Mouth/Throat:      Mouth: Mucous membranes are moist.      Pharynx: Oropharynx is clear.   Eyes:      Conjunctiva/sclera: Conjunctivae normal.      Pupils: Pupils are equal, round, and reactive to light.   Cardiovascular:      Rate and Rhythm: Normal rate and regular rhythm.      Heart sounds: No murmur heard.  Pulmonary:      Effort: Pulmonary effort is normal. No respiratory distress.      Breath sounds: Normal breath sounds. No wheezing.   Abdominal:      General: Abdomen is flat. Bowel sounds are normal. There is no distension.      Palpations: Abdomen is soft. There is no mass.      Tenderness: There is no abdominal tenderness. There is no guarding.   Musculoskeletal:         General: No swelling. Normal range of motion.      Cervical back: Normal range of motion.   Lymphadenopathy:      Cervical: No cervical adenopathy.   Skin:     General: Skin is warm and dry.   Neurological:      General: No focal deficit present.      Mental Status: She is alert and oriented to person, place, and time. Mental status is at baseline.   Psychiatric:         Mood and Affect: Mood normal.       PHQ-9 Score  PHQ-9 Total Score:       Pain " "Score  Vitals:    08/31/23 1238   BP: 95/62   Pulse: 84   Resp: 18   Temp: 97.1 øF (36.2 øC)   TempSrc: Temporal   SpO2: 95%   Weight: 83.5 kg (184 lb)   Height: 160 cm (63\")   PainSc: 0-No pain       ECOG score: 0           PAINSCOREQUALITYMETRIC:   Vitals:    08/31/23 1238   PainSc: 0-No pain          Lab Review  Lab Results   Component Value Date    WBC 8.51 08/31/2023    HGB 11.7 (L) 08/31/2023    HCT 36.5 08/31/2023    MCV 84.3 08/31/2023    RDW 15.3 08/31/2023     08/31/2023    NEUTRORELPCT 65.1 08/31/2023    LYMPHORELPCT 19.0 (L) 08/31/2023    MONORELPCT 12.6 (H) 08/31/2023    EOSRELPCT 2.1 08/31/2023    BASORELPCT 0.6 08/31/2023    NEUTROABS 5.54 08/31/2023    LYMPHSABS 1.62 08/31/2023       Lab Results   Component Value Date     (H) 08/31/2023    K 2.9 (L) 08/31/2023    CO2 27.7 08/31/2023     08/31/2023    BUN 14 08/31/2023    CREATININE 1.08 (H) 08/31/2023    GLUCOSE 97 08/31/2023    CALCIUM 9.1 08/31/2023    ALKPHOS 79 08/31/2023    AST 19 08/31/2023    ALT 5 08/31/2023    BILITOT 0.7 08/31/2023    ALBUMIN 3.5 08/31/2023    PROTEINTOT 7.2 08/31/2023       Radiology Results  CT Chest Without Contrast Diagnostic    Result Date: 6/23/2023   1. Appearance concerning for bony metastasis involving right ribs and T11 vertebral body. 2. Large spiculated right lung mass, unchanged. 3. No pneumothorax.    This report was finalized on 6/23/2023 3:11 PM by Dr. Eddie Olsen MD.      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  This report was finalized on 4/13/2023 2:03 PM by Dr. Roland Jenkins MD.      XR Chest 1 View    Result Date: 6/28/2023  Impression: 1. No pneumothorax or pneumomediastinum. 2. Abnormal density in the right lung base corresponding to patient's known lung mass. There is some surrounding airspace disease which may be secondary to the biopsy procedure. 3. Right-sided rib fractures which are pathologic " fractures on the outside CT exam. Electronically Signed: Dominic Adamaris  6/28/2023 9:30 AM EDT  Workstation ID: [id number]    XR Chest 1 View    Result Date: 4/20/2023  1. Cardiac enlargement. No evidence of CHF. 2. Patient has a known malignant right middle lobe mass and known right-sided rib lesions. Signer Name: Canelo Brand MD  Signed: 4/20/2023 2:00 AM  Workstation Name: [id number]  [facility]    CT Needle Biopsy Lung    Result Date: 5/19/2023  CT-guided needle biopsy right lung.  This report was finalized on 5/19/2023 10:34 AM by Dr. Maico Mancera MD.      NM PET/CT Skull Base to Mid Thigh    Result Date: 4/11/2023  1.  Right middle lobe pulmonary nodule is 26.6 x 24.2 mm and appears to attach to the major fissure as well as the pleura at the periphery and demonstrates FDG hypermetabolism in the range of malignancy. Maximum SUV: 13.8. 2.  FDG hypermetabolism localizing to the right hilum most consistent with hypermetabolic adenopathy with maximum SUV: 6.6. 3.  Extensive FDG hypermetabolism localizing to the anterior maxilla which could BE due to inflammation in the setting of dental disease or could represent FDG hypermetabolic bone metastasis. Maximum SUV: 10.2. 4.  FDG hypermetabolism in association of a right lateral fifth rib lesion with pathologic fracture with maximum SUV: 8.6. 5.  FDG hypermetabolism in association with lytic bone lesion of the right posterior seventh rib with maximum SUV: 9.8. 6.  FDG hypermetabolism in association with lytic bone lesion of the right iliac wing with maximum SUV: 8.7. 7.  Nonenlarged upper cervical lymph nodes show low-grade FDG hypermetabolism that approach the range of malignancy but are predominantly in the range of inflammation with maximum SUV: 3.4, 3.2. Right cervical lymph node is 1.1 cm. Left cervical or facial lymph node is 0.7 cm. 8. Other incidental and nonacute findings as above.  This report was finalized on 4/11/2023 11:53 AM by   Maico Mancera MD.       Pathology:     PATHOLOGY - SCAN - GUARDANT 360/ SUMMARY OF DECTEDCTED SOMATIC ALTERATIONS,IMMUNOTHERAPY BIOMARKERS/7/19/23 (07/19/2023)             Tissue Pathology Exam (06/28/2023 08:15)             Assessment / Plan         Assessment and Plan   Caprice Rodriguez is a 73 y.o. 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  K-vaughn G12 C mutation.  -Patient with right-sided pain after a fall, noted to have lung lesion right middle lobe on chest x-ray.  PET/CT April 2023 with 2.6 x 2.4 cm right middle lobe pulmonary nodule, FDG hypermetabolism of right lateral fifth rib, right posterior seventh rib, right iliac wing  -Initial BAL and lavage (5/02) without malignant cells.  CT-guided biopsy (5/19) negative for dysplasia or malignancy.  Navigational bronchoscopy with tissue pathology (6/28) showed RML with atypical cells consistent with carcinoma and lymph node station 8R (paraesophageal) positive for carcinoma, favored adenocarcinoma.  PD-L1 TPS 15%  -CT of the chest repeated 6/23 shows bony metastasis involving right ribs and T11 vertebral body.  - Given she has presumed mid metastatic lesion to her ribs and spine, she qualifies as stage IV lung cancer.  We discussed treatment which would be palliative and include chemotherapy and immunotherapy, which patient declined.  She reports having seen her sister and brother go through chemotherapy, does not want therapy that could cause her to become weaker or worse.    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  We were able to get Sotorasib 960 mg ordered for her as first line.  She started medication August 2.  Thus far, notes increased fatigue and constipation with medication.  She discontinued August 21.  We discussed changing therapy to Adagrasib (KRAS G12C) or immunotherapy, but she declined. Is interested in comfort care- we discussed hospice and she is interested. Will make  referral to hospice.     2.  Malignancy associated pain  -Currently taking Norco 5-325mg as needed for pain    3.  Nutrition  -Ordered for decadron 4 mg X 14 days    4.  Constipation  -Taking senna docusate twice daily and MiraLAX daily as needed.  Recommend discontinuing if she develops diarrhea    5.  Pruritus of the skin  -Ordered for hydrocortisone 1% cream to apply topical areas of pruritus      Discussed possible differential diagnoses, testing, treatment, recommended non-pharmacological interventions, risks, warning signs to monitor for that would indicate need for follow-up in clinic or ER. If no improvement with these regimens or you have new or worsening symptoms follow-up. Patient verbalizes understanding and agreement with plan of care. Denies further needs or concerns.     Patient was given instructions and counseling regarding her condition and for health maintenance advised.       All questions were answered to her satisfaction.              Meds ordered during this visit  New Medications Ordered This Visit   Medications    dexAMETHasone (DECADRON) 4 MG tablet     Sig: Take 1 tablet by mouth Daily With Breakfast.     Dispense:  14 tablet     Refill:  0       Visit Diagnoses    ICD-10-CM ICD-9-CM   1. Non-small cell lung cancer metastatic to bone  C34.90 162.9    C79.51 198.5         Follow Up   As needed        This document has been electronically signed by Jami Holly MD   August 31, 2023 13:35 EDT    Dictated Utilizing Dragon Dictation: Part of this note may be an electronic transcription/translation of spoken language to printed text using the Dragon Dictation System.

## 2023-08-31 ENCOUNTER — OFFICE VISIT (OUTPATIENT)
Dept: ONCOLOGY | Facility: CLINIC | Age: 73
End: 2023-08-31
Payer: MEDICARE

## 2023-08-31 ENCOUNTER — LAB (OUTPATIENT)
Dept: ONCOLOGY | Facility: CLINIC | Age: 73
End: 2023-08-31
Payer: MEDICARE

## 2023-08-31 VITALS
TEMPERATURE: 97.1 F | OXYGEN SATURATION: 95 % | HEIGHT: 63 IN | HEART RATE: 84 BPM | RESPIRATION RATE: 18 BRPM | DIASTOLIC BLOOD PRESSURE: 62 MMHG | WEIGHT: 184 LBS | BODY MASS INDEX: 32.6 KG/M2 | SYSTOLIC BLOOD PRESSURE: 95 MMHG

## 2023-08-31 DIAGNOSIS — C34.90 NON-SMALL CELL LUNG CANCER METASTATIC TO BONE: Primary | ICD-10-CM

## 2023-08-31 DIAGNOSIS — C79.51 NON-SMALL CELL LUNG CANCER METASTATIC TO BONE: Primary | ICD-10-CM

## 2023-08-31 DIAGNOSIS — C34.90 NON-SMALL CELL LUNG CANCER METASTATIC TO BONE: ICD-10-CM

## 2023-08-31 DIAGNOSIS — C79.51 NON-SMALL CELL LUNG CANCER METASTATIC TO BONE: ICD-10-CM

## 2023-08-31 LAB
ALBUMIN SERPL-MCNC: 3.5 G/DL (ref 3.5–5.2)
ALBUMIN/GLOB SERPL: 0.9 G/DL
ALP SERPL-CCNC: 79 U/L (ref 39–117)
ALT SERPL W P-5'-P-CCNC: 5 U/L (ref 1–33)
ANION GAP SERPL CALCULATED.3IONS-SCNC: 13.3 MMOL/L (ref 5–15)
AST SERPL-CCNC: 19 U/L (ref 1–32)
BASOPHILS # BLD AUTO: 0.05 10*3/MM3 (ref 0–0.2)
BASOPHILS NFR BLD AUTO: 0.6 % (ref 0–1.5)
BILIRUB SERPL-MCNC: 0.7 MG/DL (ref 0–1.2)
BUN SERPL-MCNC: 14 MG/DL (ref 8–23)
BUN/CREAT SERPL: 13 (ref 7–25)
CALCIUM SPEC-SCNC: 9.1 MG/DL (ref 8.6–10.5)
CHLORIDE SERPL-SCNC: 105 MMOL/L (ref 98–107)
CO2 SERPL-SCNC: 27.7 MMOL/L (ref 22–29)
CREAT SERPL-MCNC: 1.08 MG/DL (ref 0.57–1)
DEPRECATED RDW RBC AUTO: 46.5 FL (ref 37–54)
EGFRCR SERPLBLD CKD-EPI 2021: 53.7 ML/MIN/1.73
EOSINOPHIL # BLD AUTO: 0.18 10*3/MM3 (ref 0–0.4)
EOSINOPHIL NFR BLD AUTO: 2.1 % (ref 0.3–6.2)
ERYTHROCYTE [DISTWIDTH] IN BLOOD BY AUTOMATED COUNT: 15.3 % (ref 12.3–15.4)
GLOBULIN UR ELPH-MCNC: 3.7 GM/DL
GLUCOSE SERPL-MCNC: 97 MG/DL (ref 65–99)
HCT VFR BLD AUTO: 36.5 % (ref 34–46.6)
HGB BLD-MCNC: 11.7 G/DL (ref 12–15.9)
IMM GRANULOCYTES # BLD AUTO: 0.05 10*3/MM3 (ref 0–0.05)
IMM GRANULOCYTES NFR BLD AUTO: 0.6 % (ref 0–0.5)
LYMPHOCYTES # BLD AUTO: 1.62 10*3/MM3 (ref 0.7–3.1)
LYMPHOCYTES NFR BLD AUTO: 19 % (ref 19.6–45.3)
MCH RBC QN AUTO: 27 PG (ref 26.6–33)
MCHC RBC AUTO-ENTMCNC: 32.1 G/DL (ref 31.5–35.7)
MCV RBC AUTO: 84.3 FL (ref 79–97)
MONOCYTES # BLD AUTO: 1.07 10*3/MM3 (ref 0.1–0.9)
MONOCYTES NFR BLD AUTO: 12.6 % (ref 5–12)
NEUTROPHILS NFR BLD AUTO: 5.54 10*3/MM3 (ref 1.7–7)
NEUTROPHILS NFR BLD AUTO: 65.1 % (ref 42.7–76)
NRBC BLD AUTO-RTO: 0 /100 WBC (ref 0–0.2)
PLATELET # BLD AUTO: 251 10*3/MM3 (ref 140–450)
PMV BLD AUTO: 9.1 FL (ref 6–12)
POTASSIUM SERPL-SCNC: 2.9 MMOL/L (ref 3.5–5.2)
PROT SERPL-MCNC: 7.2 G/DL (ref 6–8.5)
RBC # BLD AUTO: 4.33 10*6/MM3 (ref 3.77–5.28)
SODIUM SERPL-SCNC: 146 MMOL/L (ref 136–145)
WBC NRBC COR # BLD: 8.51 10*3/MM3 (ref 3.4–10.8)

## 2023-08-31 PROCEDURE — 85025 COMPLETE CBC W/AUTO DIFF WBC: CPT | Performed by: INTERNAL MEDICINE

## 2023-08-31 PROCEDURE — 80053 COMPREHEN METABOLIC PANEL: CPT | Performed by: INTERNAL MEDICINE

## 2023-08-31 RX ORDER — DEXAMETHASONE 4 MG/1
4 TABLET ORAL
Qty: 14 TABLET | Refills: 0 | Status: SHIPPED | OUTPATIENT
Start: 2023-08-31

## 2023-08-31 NOTE — PROGRESS NOTES
Venipuncture Blood Specimen Collection  Venipuncture performed in right arm by Jase Bass MA with good hemostasis. Patient tolerated the procedure well without complications.   08/31/23   Jase Bass MA

## 2023-09-13 ENCOUNTER — OFFICE VISIT (OUTPATIENT)
Dept: CARDIAC SURGERY | Facility: CLINIC | Age: 73
End: 2023-09-13
Payer: MEDICARE

## 2023-09-13 VITALS
HEIGHT: 62 IN | OXYGEN SATURATION: 97 % | BODY MASS INDEX: 33.49 KG/M2 | WEIGHT: 182 LBS | TEMPERATURE: 97.5 F | SYSTOLIC BLOOD PRESSURE: 120 MMHG | HEART RATE: 74 BPM | DIASTOLIC BLOOD PRESSURE: 90 MMHG

## 2023-09-13 DIAGNOSIS — C79.51 METASTATIC CANCER TO BONE: ICD-10-CM

## 2023-09-13 DIAGNOSIS — C34.91 NON-SMALL CELL CANCER OF RIGHT LUNG: Primary | ICD-10-CM

## 2023-09-13 PROBLEM — B49 FUNGAL PNEUMONIA: Status: RESOLVED | Noted: 2023-06-01 | Resolved: 2023-09-13

## 2023-09-13 PROBLEM — J16.8 FUNGAL PNEUMONIA: Status: RESOLVED | Noted: 2023-06-01 | Resolved: 2023-09-13

## 2023-09-13 RX ORDER — ALBUTEROL SULFATE 90 UG/1
1 AEROSOL, METERED RESPIRATORY (INHALATION)
COMMUNITY
Start: 2023-09-04

## 2023-09-13 NOTE — PROGRESS NOTES
[facility] Cardiothoracic Surgery Office Follow Up Note    Date of Encounter: 2023     Name: Caprice Rodriguez  : 1950     Referred By: No ref. provider found  PCP: Roxie Russo APRN    Chief Complaint:    Chief Complaint   Patient presents with    Follow-up     FU with EBUS for Right Lung Nodule       Subjective      History of Present Illness:    It was nice to see Caprice Rodriguez in follow up.  She is a pleasant 73 y.o. female with PMH significant for hyperlipidemia, BMI 34.83. hypertension, remote tobacco abuse, left subclavian artery occlusion, right middle lobe lung nodule, 20 lb weight loss over 3 months, and right rib fracture s/p fall .      Ms. Rodriguez was evaluated in office by Dr. Stallworth on 2023 for left subclavian artery occlusion and right middle lobe lung nodule.  Patient had a chest x-ray after a fall which prompted CT scan and PET scan.  Patient reported approximately 20 lb weight loss over 3 months.  She denies lymphadenopathy, fever, or chills.  Patient denied left arm pain, dizziness, or headaches.  Dr. Stallworth discussed case with Dr. Adair Smith for possible navigational bronchoscopy to biopsy both the lung nodule and the right hilum to establish tissue diagnosis.  On  patient underwent navigational bronchoscopy with Dr. Smith.  Pathology was consistent with carcinoma.  Patient was established with Hematology/Oncology, Dr. 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  Patient attempted chemotherapy but discontinued on  due to fatigue, nausea, vomiting, and constipation/diarrhea.  Immunotherapy was discussed but patient ultimately refused and requested referral to Hospice care.  Ms. Rodriguez presents today for follow up.  She overall is doing okay.  Patient states she does get short of breath at times.  She states she was evaluated by Hospice but has not required care yet.  Patient states she is not afraid of  dying as she knows where she is going but does not want to suffer.  Reports following closely with PCP.       Review of Systems:  Review of Systems   Constitutional: Positive for malaise/fatigue and weight loss. Negative for chills, decreased appetite, diaphoresis, fever, night sweats and weight gain.   HENT:  Negative for hoarse voice.    Eyes:  Negative for blurred vision, double vision and visual disturbance.   Cardiovascular:  Positive for dyspnea on exertion. Negative for chest pain, claudication, irregular heartbeat, leg swelling, near-syncope, orthopnea, palpitations, paroxysmal nocturnal dyspnea and syncope.   Respiratory:  Positive for cough, shortness of breath and wheezing. Negative for hemoptysis and sputum production.    Hematologic/Lymphatic: Negative for adenopathy and bleeding problem. Bruises/bleeds easily.   Skin:  Negative for color change, nail changes, poor wound healing and rash.   Musculoskeletal:  Positive for arthritis and back pain. Negative for falls and muscle cramps.   Gastrointestinal:  Negative for dysphagia and heartburn.   Genitourinary:  Negative for flank pain.   Neurological:  Negative for brief paralysis, disturbances in coordination, dizziness, focal weakness, headaches, light-headedness, loss of balance, numbness, paresthesias, sensory change, vertigo and weakness.   Psychiatric/Behavioral:  Negative for depression and suicidal ideas.    Allergic/Immunologic: Negative for persistent infections.     I have reviewed the following portions of the patient's history: problem list, current medications, allergies, past surgical history, past medical history, past social history, past family history, and ROS and confirm it's accurate.    Allergies:  No Known Allergies    Medications:      Current Outpatient Medications:     ACETAMINOPHEN PO, Take  by mouth As Needed., Disp: , Rfl:     amitriptyline (ELAVIL) 25 MG tablet, Take 1 tablet by mouth Daily., Disp: , Rfl:     atenolol  (TENORMIN) 25 MG tablet, Take 1 tablet by mouth Daily., Disp: , Rfl:     atorvastatin (LIPITOR) 20 MG tablet, Take 1 tablet by mouth Daily., Disp: , Rfl:     Budesonide-Formoterol Fumarate (SYMBICORT IN), Inhale 2 puffs 2 (two) times a day as needed., Disp: , Rfl:     Calcium Carbonate-Vitamin D (CALCIUM 600+D3 PO), Take  by mouth., Disp: , Rfl:     Cholecalciferol (Vitamin D3) 1.25 MG (44141 UT) capsule, Take 1 capsule by mouth 1 (One) Time Per Week., Disp: , Rfl:     dexAMETHasone (DECADRON) 4 MG tablet, Take 1 tablet by mouth Daily With Breakfast., Disp: 14 tablet, Rfl: 0    hydrocortisone 1 % cream, Apply 1 application  topically to the appropriate area as directed 2 (Two) Times a Day., Disp: 1 each, Rfl: 3    levothyroxine (SYNTHROID, LEVOTHROID) 112 MCG tablet, Take 1 tablet by mouth Daily., Disp: , Rfl:     lisinopril (PRINIVIL,ZESTRIL) 10 MG tablet, Take 1 tablet by mouth Every Night., Disp: , Rfl:     lisinopril-hydrochlorothiazide (PRINZIDE,ZESTORETIC) 10-12.5 MG per tablet, Take 1 tablet by mouth Every Morning., Disp: , Rfl:     meloxicam (MOBIC) 7.5 MG tablet, Take 1 tablet by mouth Daily., Disp: , Rfl:     nitroglycerin (NITROSTAT) 0.4 MG SL tablet, , Disp: , Rfl:     omeprazole (PriLOSEC) 20 MG capsule, Take 1 capsule by mouth 2 (Two) Times a Day., Disp: , Rfl:     ondansetron (ZOFRAN) 8 MG tablet, Take 1 tablet by mouth 3 (Three) Times a Day As Needed for Nausea or Vomiting., Disp: 30 tablet, Rfl: 5    polyethylene glycol (MiraLax) 17 GM/SCOOP powder, Take 17 g by mouth Daily As Needed (Constipation)., Disp: 510 g, Rfl: 2    potassium chloride (K-DUR) 10 MEQ CR tablet, Take 1 tablet by mouth Daily., Disp: , Rfl:     prochlorperazine (COMPAZINE) 10 MG tablet, Take 1 tablet by mouth Every 6 (Six) Hours As Needed for Nausea or Vomiting. May take with the ondansetron., Disp: 60 tablet, Rfl: 3    tolterodine LA (DETROL LA) 4 MG 24 hr capsule, Take 1 capsule by mouth Daily., Disp: , Rfl:     Ventolin HFA  108 (90 Base) MCG/ACT inhaler, 1 puff., Disp: , Rfl:     vitamin B-12 (CYANOCOBALAMIN) 1000 MCG tablet, Take 1 tablet by mouth 1 (One) Time Per Week., Disp: , Rfl:     albuterol (ACCUNEB) 0.63 MG/3ML nebulizer solution, USE 1 VIAL IN NEBULIZER EVERY 6 HOURS (Patient not taking: Reported on 9/13/2023), Disp: , Rfl:     HYDROcodone-acetaminophen (NORCO) 5-325 MG per tablet, Take 1 tablet by mouth Every 8 (Eight) Hours As Needed for Moderate Pain. (Patient not taking: Reported on 9/13/2023), Disp: 30 tablet, Rfl: 0    sennosides-docusate (senna-docusate sodium) 8.6-50 MG per tablet, Take 2 tablets by mouth 2 (Two) Times a Day As Needed for Constipation. (Patient not taking: Reported on 9/13/2023), Disp: 120 tablet, Rfl: 2    Sotorasib (Lumakras) 320 MG tablet, Take 3 tablets by mouth Daily. (Patient not taking: Reported on 9/13/2023), Disp: 90 tablet, Rfl: 5    History:   Past Medical History:   Diagnosis Date    Arthritis     Chest pain     COPD (chronic obstructive pulmonary disease)     Elevated cholesterol     GERD (gastroesophageal reflux disease)     Hyperlipidemia     Hypertension     Hypokalemia     Hypothyroidism     Lung cancer     Pericardial effusion, trivial        Past Surgical History:   Procedure Laterality Date    APPENDECTOMY      BREAST BIOPSY Left     BRONCHOSCOPY Bilateral 05/02/2023    Procedure: BRONCHOSCOPY WITH ENDOBRONCHIAL ULTRASOUND;  Surgeon: Juan Henry MD;  Location:  [facility];  Service: Pulmonary;  Laterality: Bilateral;    BRONCHOSCOPY WITH ION ROBOTIC ASSIST N/A 6/28/2023    Procedure: NAVIGATIONAL BRONCHOSCOPY WITH ION ROBOT;  Surgeon: Royal Smith MD;  Location:  [facility];  Service: Robotics - Pulmonary;  Laterality: N/A;    CARDIAC CATHETERIZATION  2008    CARDIOVASCULAR STRESS TEST  09/2014    CATARACT EXTRACTION, BILATERAL Bilateral     2015 right eye, Dr Hauser  and  2/2017 left eye Dr Meyer    COLONOSCOPY      ECHO - CONVERTED  09/2014    ENDOSCOPY       "GALLBLADDER SURGERY      HERNIA REPAIR      TUBAL ABDOMINAL LIGATION         Social History     Socioeconomic History    Marital status:     Number of children: 1   Tobacco Use    Smoking status: Former     Packs/day: 2.00     Years: 20.00     Pack years: 40.00     Types: Cigarettes     Quit date:      Years since quittin.7     Passive exposure: Past    Smokeless tobacco: Never   Vaping Use    Vaping Use: Never used   Substance and Sexual Activity    Alcohol use: Not Currently     Alcohol/week: 1.0 standard drink     Types: 1 Cans of beer per week     Comment: occasional fire ball for sickness    Drug use: No    Sexual activity: Defer        Family History   Problem Relation Age of Onset    Heart disease Mother     Diabetes Mother     Heart attack Mother         cause of death    Cirrhosis Father         cause of death    Cancer Sister         liver ca    Cancer Brother         lung ca    Cancer Brother         colon ca    Heart disease Brother     Heart failure Brother     Cancer Sister         lung ca    Heart failure Sister     Cancer Sister        Objective     Physical Exam:  Vitals:    23 1051   BP: 120/90   BP Location: Left arm   Patient Position: Sitting   Pulse: 74   Temp: 97.5 °F (36.4 °C)   SpO2: 97%   Weight: 82.6 kg (182 lb)   Height: 157.5 cm (62\")  Comment: patient reported      Body mass index is 33.29 kg/m².    Physical Exam  Vitals reviewed.   Constitutional:       General: She is not in acute distress.     Appearance: She is not ill-appearing.   Eyes:      Pupils: Pupils are equal, round, and reactive to light.   Pulmonary:      Breath sounds: Normal air entry.      Comments: Mildly labored at rest.   Skin:     General: Skin is warm and dry.   Neurological:      General: No focal deficit present.      Mental Status: She is alert and oriented to person, place, and time.   Psychiatric:         Mood and Affect: Mood normal.         Behavior: Behavior normal.         Thought " Content: Thought content normal.         Judgment: Judgment normal.       Imaging/Labs:  No imaging/labs ordered for this encounter.     Assessment / Plan      Assessment / Plan:  PMH significant for hyperlipidemia, BMI 34.83. hypertension, remote tobacco abuse, left subclavian artery occlusion, right middle lobe lung nodule, 20 lb weight loss over 3 months, and right rib fracture s/p fall .      Non small cell carcinoma of lung  Metastatic cancer to bone  Evaluated in office by Dr. Stallworth on 6/7/2023 for left SC artery occlusion and RML lung nodule.   CXR after a fall prompted CT and PET scan.   Reported approximately 20 lb weight loss over 3 months.   Denies lymphadenopathy, fever, or, chills.   Under navigation bronch with Dr. Smith on 6/28.  Pathology consistent with carcinoma.   Established with Hematology/Oncology, Dr. 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  Attempted chemotherapy but discontinued on 8/21 due to fatigue, nausea, vomiting, and constipation/diarrhea.   Immunotherapy was discussed but patient ultimately refused.  Requested referral to Hospice.   Presents today for follow up.   Overall doing okay.   Reports shortness of breath at times with supplemental home oxygen as needed.   Evaluated by Hospice but has not required care yet.   States she is not afraid of dying as she knows where she is going but does not want to suffer.   Reports following closely with PCP.     Follow Up:   We will follow on an as needed basis as patient is established with Hospice and wishes to just follow with PCP.    Patient encouraged to call the office with any questions or concerns.     Thank you for allowing me to participate in your care.  Best Regards,    STEPHANY Lujan  [facility] Cardiothoracic Surgery  09/13/23  11:01 EDT

## 2023-11-05 DIAGNOSIS — R91.8 MASS OF RIGHT LUNG: ICD-10-CM

## 2023-11-06 RX ORDER — PROCHLORPERAZINE MALEATE 10 MG
10 TABLET ORAL EVERY 6 HOURS PRN
Qty: 60 TABLET | Refills: 3 | OUTPATIENT
Start: 2023-11-06

## (undated) DEVICE — BRUSH CYTO MULTI APPL

## (undated) DEVICE — ADAPT SWVL FIBROPTIC BRONCH

## (undated) DEVICE — TUBING, SUCTION, 1/4" X 20', STRAIGHT: Brand: MEDLINE INDUSTRIES, INC.

## (undated) DEVICE — Device

## (undated) DEVICE — Device: Brand: SINGLE USE ASPIRATION NEEDLE NA-U401SX

## (undated) DEVICE — FRCP BX RADJAW4 PULM WO NDL STD1.8X2 100

## (undated) DEVICE — SINGLE USE BIOPSY VALVE MAJ-210: Brand: SINGLE USE BIOPSY VALVE (STERILE)

## (undated) DEVICE — SINGLE USE SUCTION VALVE MAJ-209: Brand: SINGLE USE SUCTION VALVE (STERILE)

## (undated) DEVICE — TUBING, SUCTION, 3/16" X 6', STRAIGHT: Brand: MEDLINE

## (undated) DEVICE — GOWN,REINF,POLY,ECL,PP SLV,XL: Brand: MEDLINE

## (undated) DEVICE — SYR LUER SLPTP 50ML